# Patient Record
Sex: MALE | Race: WHITE | Employment: OTHER | URBAN - METROPOLITAN AREA
[De-identification: names, ages, dates, MRNs, and addresses within clinical notes are randomized per-mention and may not be internally consistent; named-entity substitution may affect disease eponyms.]

---

## 2017-12-21 ENCOUNTER — HOSPITAL ENCOUNTER (EMERGENCY)
Facility: HOSPITAL | Age: 62
Discharge: HOME | End: 2017-12-21
Payer: MEDICAID

## 2017-12-21 ENCOUNTER — APPOINTMENT (OUTPATIENT)
Dept: GENERAL RADIOLOGY | Facility: HOSPITAL | Age: 62
End: 2017-12-21
Payer: MEDICAID

## 2017-12-21 VITALS
DIASTOLIC BLOOD PRESSURE: 90 MMHG | WEIGHT: 215 LBS | HEIGHT: 72 IN | SYSTOLIC BLOOD PRESSURE: 153 MMHG | OXYGEN SATURATION: 98 % | BODY MASS INDEX: 29.12 KG/M2 | TEMPERATURE: 98 F | HEART RATE: 91 BPM | RESPIRATION RATE: 18 BRPM

## 2017-12-21 DIAGNOSIS — S90.32XA CONTUSION OF LEFT FOOT, INITIAL ENCOUNTER: Primary | ICD-10-CM

## 2017-12-21 DIAGNOSIS — L03.119 CELLULITIS OF FOOT: ICD-10-CM

## 2017-12-21 PROCEDURE — 73630 X-RAY EXAM OF FOOT: CPT

## 2017-12-21 PROCEDURE — 82962 GLUCOSE BLOOD TEST: CPT

## 2017-12-21 PROCEDURE — 99283 EMERGENCY DEPT VISIT LOW MDM: CPT

## 2017-12-21 RX ORDER — CEPHALEXIN 500 MG/1
500 CAPSULE ORAL 4 TIMES DAILY
Qty: 28 CAPSULE | Refills: 0 | Status: SHIPPED | OUTPATIENT
Start: 2017-12-21 | End: 2018-01-09

## 2017-12-21 RX ORDER — HYDROCODONE BITARTRATE AND ACETAMINOPHEN 5; 325 MG/1; MG/1
1-2 TABLET ORAL EVERY 4 HOURS PRN
Qty: 10 TABLET | Refills: 0 | Status: SHIPPED | OUTPATIENT
Start: 2017-12-21 | End: 2018-01-09

## 2017-12-22 NOTE — ED INITIAL ASSESSMENT (HPI)
Dropped garbage can on left foot last Thursday.  Redness, swelling and discoloration to top of foot and 5th digit

## 2017-12-22 NOTE — ED PROVIDER NOTES
Patient Seen in: [facility] AND [facility] Emergency Department    History   Patient presents with:  Musculoskeletal Problem    Stated Complaint: Lt foot pain    HPI    Patient is a 80-year-old diabetic male who presents with left foot injury that occurred 8 day speech, 5/5 motor strength in all extremities, no focal deficits  SKIN: warm, dry        ED Course   Labs Reviewed - No data to display    ED Course as of Dec 21 2236  ------------------------------------------------------------       MDM   Xr Foot, Comple

## 2017-12-24 ENCOUNTER — HOSPITAL ENCOUNTER (INPATIENT)
Facility: HOSPITAL | Age: 62
LOS: 16 days | Discharge: NURSING, REHAB OR LONG-TERM CARE | DRG: 616 | End: 2018-01-09
Attending: EMERGENCY MEDICINE | Admitting: HOSPITALIST
Payer: MEDICAID

## 2017-12-24 DIAGNOSIS — I96 GANGRENE OF TOE (HCC): ICD-10-CM

## 2017-12-24 DIAGNOSIS — E11.628 CELLULITIS IN DIABETIC FOOT (HCC): Primary | ICD-10-CM

## 2017-12-24 DIAGNOSIS — L03.119 CELLULITIS IN DIABETIC FOOT (HCC): Primary | ICD-10-CM

## 2017-12-24 LAB
ANION GAP SERPL CALC-SCNC: 9 MMOL/L (ref 0–18)
BASOPHILS # BLD: 0.1 K/UL (ref 0–0.2)
BASOPHILS NFR BLD: 1 %
BUN SERPL-MCNC: 21 MG/DL (ref 8–20)
BUN/CREAT SERPL: 13.5 (ref 10–20)
CALCIUM SERPL-MCNC: 8.5 MG/DL (ref 8.5–10.5)
CHLORIDE SERPL-SCNC: 101 MMOL/L (ref 95–110)
CO2 SERPL-SCNC: 24 MMOL/L (ref 22–32)
CREAT SERPL-MCNC: 1.56 MG/DL (ref 0.5–1.5)
EOSINOPHIL # BLD: 0.1 K/UL (ref 0–0.7)
EOSINOPHIL NFR BLD: 1 %
ERYTHROCYTE [DISTWIDTH] IN BLOOD BY AUTOMATED COUNT: 13.3 % (ref 11–15)
GLUCOSE BLDC GLUCOMTR-MCNC: 286 MG/DL (ref 70–99)
GLUCOSE SERPL-MCNC: 321 MG/DL (ref 70–99)
HCT VFR BLD AUTO: 36.6 % (ref 41–52)
HGB BLD-MCNC: 12.4 G/DL (ref 13.5–17.5)
LYMPHOCYTES # BLD: 0.9 K/UL (ref 1–4)
LYMPHOCYTES NFR BLD: 7 %
MCH RBC QN AUTO: 27.9 PG (ref 27–32)
MCHC RBC AUTO-ENTMCNC: 33.8 G/DL (ref 32–37)
MCV RBC AUTO: 82.4 FL (ref 80–100)
MONOCYTES # BLD: 1.1 K/UL (ref 0–1)
MONOCYTES NFR BLD: 8 %
NEUTROPHILS # BLD AUTO: 11.3 K/UL (ref 1.8–7.7)
NEUTROPHILS NFR BLD: 84 %
OSMOLALITY UR CALC.SUM OF ELEC: 293 MOSM/KG (ref 275–295)
PLATELET # BLD AUTO: 126 K/UL (ref 140–400)
PMV BLD AUTO: 11.3 FL (ref 7.4–10.3)
POTASSIUM SERPL-SCNC: 4.3 MMOL/L (ref 3.3–5.1)
RBC # BLD AUTO: 4.44 M/UL (ref 4.5–5.9)
SODIUM SERPL-SCNC: 134 MMOL/L (ref 136–144)
WBC # BLD AUTO: 13.5 K/UL (ref 4–11)

## 2017-12-24 PROCEDURE — 80048 BASIC METABOLIC PNL TOTAL CA: CPT | Performed by: EMERGENCY MEDICINE

## 2017-12-24 PROCEDURE — 87040 BLOOD CULTURE FOR BACTERIA: CPT | Performed by: EMERGENCY MEDICINE

## 2017-12-24 PROCEDURE — 85025 COMPLETE CBC W/AUTO DIFF WBC: CPT | Performed by: EMERGENCY MEDICINE

## 2017-12-24 PROCEDURE — 96372 THER/PROPH/DIAG INJ SC/IM: CPT

## 2017-12-24 PROCEDURE — 96365 THER/PROPH/DIAG IV INF INIT: CPT

## 2017-12-24 PROCEDURE — 82962 GLUCOSE BLOOD TEST: CPT

## 2017-12-24 PROCEDURE — 96375 TX/PRO/DX INJ NEW DRUG ADDON: CPT

## 2017-12-24 PROCEDURE — 99285 EMERGENCY DEPT VISIT HI MDM: CPT

## 2017-12-24 PROCEDURE — 96368 THER/DIAG CONCURRENT INF: CPT

## 2017-12-24 RX ORDER — ONDANSETRON 2 MG/ML
4 INJECTION INTRAMUSCULAR; INTRAVENOUS EVERY 4 HOURS PRN
Status: DISCONTINUED | OUTPATIENT
Start: 2017-12-24 | End: 2018-01-09

## 2017-12-24 RX ORDER — HYDROCODONE BITARTRATE AND ACETAMINOPHEN 5; 325 MG/1; MG/1
1-2 TABLET ORAL EVERY 4 HOURS PRN
Status: DISCONTINUED | OUTPATIENT
Start: 2017-12-24 | End: 2018-01-09

## 2017-12-24 RX ORDER — METRONIDAZOLE 500 MG/100ML
500 INJECTION, SOLUTION INTRAVENOUS ONCE
Status: COMPLETED | OUTPATIENT
Start: 2017-12-24 | End: 2017-12-24

## 2017-12-24 RX ORDER — ONDANSETRON 2 MG/ML
4 INJECTION INTRAMUSCULAR; INTRAVENOUS EVERY 6 HOURS PRN
Status: DISCONTINUED | OUTPATIENT
Start: 2017-12-24 | End: 2018-01-09

## 2017-12-24 RX ORDER — HEPARIN SODIUM 5000 [USP'U]/ML
5000 INJECTION, SOLUTION INTRAVENOUS; SUBCUTANEOUS EVERY 8 HOURS SCHEDULED
Status: DISCONTINUED | OUTPATIENT
Start: 2017-12-24 | End: 2018-01-02

## 2017-12-24 RX ORDER — INSULIN ASPART 100 [IU]/ML
0.15 INJECTION, SOLUTION INTRAVENOUS; SUBCUTANEOUS ONCE
Status: COMPLETED | OUTPATIENT
Start: 2017-12-24 | End: 2017-12-24

## 2017-12-24 RX ORDER — DEXTROSE MONOHYDRATE 25 G/50ML
50 INJECTION, SOLUTION INTRAVENOUS AS NEEDED
Status: DISCONTINUED | OUTPATIENT
Start: 2017-12-24 | End: 2018-01-02

## 2017-12-24 RX ORDER — SODIUM CHLORIDE 9 MG/ML
INJECTION, SOLUTION INTRAVENOUS CONTINUOUS
Status: DISCONTINUED | OUTPATIENT
Start: 2017-12-24 | End: 2017-12-27

## 2017-12-24 RX ORDER — METRONIDAZOLE 500 MG/100ML
500 INJECTION, SOLUTION INTRAVENOUS EVERY 8 HOURS
Status: DISCONTINUED | OUTPATIENT
Start: 2017-12-25 | End: 2017-12-27

## 2017-12-24 RX ORDER — SODIUM CHLORIDE 0.9 % (FLUSH) 0.9 %
3 SYRINGE (ML) INJECTION AS NEEDED
Status: DISCONTINUED | OUTPATIENT
Start: 2017-12-24 | End: 2018-01-09

## 2017-12-24 RX ORDER — ASCORBIC ACID 500 MG
500 TABLET ORAL DAILY
Status: ON HOLD | COMMUNITY
End: 2018-07-30

## 2017-12-25 ENCOUNTER — APPOINTMENT (OUTPATIENT)
Dept: GENERAL RADIOLOGY | Facility: HOSPITAL | Age: 62
DRG: 616 | End: 2017-12-25
Attending: PODIATRIST
Payer: MEDICAID

## 2017-12-25 ENCOUNTER — APPOINTMENT (OUTPATIENT)
Dept: MRI IMAGING | Facility: HOSPITAL | Age: 62
DRG: 616 | End: 2017-12-25
Attending: HOSPITALIST
Payer: MEDICAID

## 2017-12-25 ENCOUNTER — ANESTHESIA (OUTPATIENT)
Dept: SURGERY | Facility: HOSPITAL | Age: 62
DRG: 616 | End: 2017-12-25
Payer: MEDICAID

## 2017-12-25 ENCOUNTER — ANESTHESIA EVENT (OUTPATIENT)
Dept: SURGERY | Facility: HOSPITAL | Age: 62
DRG: 616 | End: 2017-12-25
Payer: MEDICAID

## 2017-12-25 ENCOUNTER — SURGERY (OUTPATIENT)
Age: 62
End: 2017-12-25

## 2017-12-25 LAB
ALBUMIN SERPL BCP-MCNC: 2.4 G/DL (ref 3.5–4.8)
ALBUMIN/GLOB SERPL: 0.6 {RATIO} (ref 1–2)
ALP SERPL-CCNC: 68 U/L (ref 32–100)
ALT SERPL-CCNC: 48 U/L (ref 17–63)
ANION GAP SERPL CALC-SCNC: 9 MMOL/L (ref 0–18)
AST SERPL-CCNC: 32 U/L (ref 15–41)
BASOPHILS # BLD: 0 K/UL (ref 0–0.2)
BASOPHILS NFR BLD: 0 %
BILIRUB SERPL-MCNC: 1.1 MG/DL (ref 0.3–1.2)
BUN SERPL-MCNC: 17 MG/DL (ref 8–20)
BUN/CREAT SERPL: 12.6 (ref 10–20)
CALCIUM SERPL-MCNC: 8.4 MG/DL (ref 8.5–10.5)
CHLORIDE SERPL-SCNC: 104 MMOL/L (ref 95–110)
CO2 SERPL-SCNC: 22 MMOL/L (ref 22–32)
CREAT SERPL-MCNC: 1.35 MG/DL (ref 0.5–1.5)
EOSINOPHIL # BLD: 0.1 K/UL (ref 0–0.7)
EOSINOPHIL NFR BLD: 1 %
ERYTHROCYTE [DISTWIDTH] IN BLOOD BY AUTOMATED COUNT: 13.2 % (ref 11–15)
ERYTHROCYTE [SEDIMENTATION RATE] IN BLOOD: 65 MM/HR (ref 0–20)
GLOBULIN PLAS-MCNC: 4.1 G/DL (ref 2.5–3.7)
GLUCOSE BLDC GLUCOMTR-MCNC: 221 MG/DL (ref 70–99)
GLUCOSE BLDC GLUCOMTR-MCNC: 225 MG/DL (ref 70–99)
GLUCOSE BLDC GLUCOMTR-MCNC: 255 MG/DL (ref 70–99)
GLUCOSE BLDC GLUCOMTR-MCNC: 266 MG/DL (ref 70–99)
GLUCOSE SERPL-MCNC: 204 MG/DL (ref 70–99)
HCT VFR BLD AUTO: 34.8 % (ref 41–52)
HGB BLD-MCNC: 11.6 G/DL (ref 13.5–17.5)
LYMPHOCYTES # BLD: 1.4 K/UL (ref 1–4)
LYMPHOCYTES NFR BLD: 9 %
MAGNESIUM SERPL-MCNC: 1.6 MG/DL (ref 1.8–2.5)
MCH RBC QN AUTO: 27.8 PG (ref 27–32)
MCHC RBC AUTO-ENTMCNC: 33.4 G/DL (ref 32–37)
MCV RBC AUTO: 83.1 FL (ref 80–100)
MONOCYTES # BLD: 1.3 K/UL (ref 0–1)
MONOCYTES NFR BLD: 9 %
NEUTROPHILS # BLD AUTO: 11.9 K/UL (ref 1.8–7.7)
NEUTROPHILS NFR BLD: 81 %
OSMOLALITY UR CALC.SUM OF ELEC: 287 MOSM/KG (ref 275–295)
PLATELET # BLD AUTO: 143 K/UL (ref 140–400)
PMV BLD AUTO: 12.2 FL (ref 7.4–10.3)
POTASSIUM SERPL-SCNC: 3.9 MMOL/L (ref 3.3–5.1)
PROT SERPL-MCNC: 6.5 G/DL (ref 5.9–8.4)
RBC # BLD AUTO: 4.18 M/UL (ref 4.5–5.9)
SODIUM SERPL-SCNC: 135 MMOL/L (ref 136–144)
WBC # BLD AUTO: 14.8 K/UL (ref 4–11)

## 2017-12-25 PROCEDURE — 82962 GLUCOSE BLOOD TEST: CPT

## 2017-12-25 PROCEDURE — 80053 COMPREHEN METABOLIC PANEL: CPT | Performed by: HOSPITALIST

## 2017-12-25 PROCEDURE — 88311 DECALCIFY TISSUE: CPT | Performed by: PODIATRIST

## 2017-12-25 PROCEDURE — 83735 ASSAY OF MAGNESIUM: CPT | Performed by: HOSPITALIST

## 2017-12-25 PROCEDURE — 87147 CULTURE TYPE IMMUNOLOGIC: CPT | Performed by: PODIATRIST

## 2017-12-25 PROCEDURE — 87070 CULTURE OTHR SPECIMN AEROBIC: CPT | Performed by: PODIATRIST

## 2017-12-25 PROCEDURE — 83036 HEMOGLOBIN GLYCOSYLATED A1C: CPT | Performed by: HOSPITALIST

## 2017-12-25 PROCEDURE — 85025 COMPLETE CBC W/AUTO DIFF WBC: CPT | Performed by: HOSPITALIST

## 2017-12-25 PROCEDURE — 87075 CULTR BACTERIA EXCEPT BLOOD: CPT | Performed by: PODIATRIST

## 2017-12-25 PROCEDURE — 85652 RBC SED RATE AUTOMATED: CPT | Performed by: HOSPITALIST

## 2017-12-25 PROCEDURE — 73718 MRI LOWER EXTREMITY W/O DYE: CPT | Performed by: HOSPITALIST

## 2017-12-25 PROCEDURE — 87205 SMEAR GRAM STAIN: CPT | Performed by: PODIATRIST

## 2017-12-25 PROCEDURE — 87186 SC STD MICRODIL/AGAR DIL: CPT | Performed by: PODIATRIST

## 2017-12-25 PROCEDURE — 0SBN0ZZ EXCISION OF LEFT METATARSAL-PHALANGEAL JOINT, OPEN APPROACH: ICD-10-PCS | Performed by: PODIATRIST

## 2017-12-25 PROCEDURE — 88305 TISSUE EXAM BY PATHOLOGIST: CPT | Performed by: PODIATRIST

## 2017-12-25 PROCEDURE — 0Y6N0ZF DETACHMENT AT LEFT FOOT, PARTIAL 5TH RAY, OPEN APPROACH: ICD-10-PCS | Performed by: PODIATRIST

## 2017-12-25 PROCEDURE — 87076 CULTURE ANAEROBE IDENT EACH: CPT | Performed by: PODIATRIST

## 2017-12-25 PROCEDURE — 87176 TISSUE HOMOGENIZATION CULTR: CPT | Performed by: PODIATRIST

## 2017-12-25 PROCEDURE — 76001 XR C-ARM FLUORO >1 HOUR  (CPT=76001): CPT | Performed by: PODIATRIST

## 2017-12-25 RX ORDER — ONDANSETRON 2 MG/ML
INJECTION INTRAMUSCULAR; INTRAVENOUS AS NEEDED
Status: DISCONTINUED | OUTPATIENT
Start: 2017-12-25 | End: 2017-12-25 | Stop reason: SURG

## 2017-12-25 RX ORDER — SODIUM CHLORIDE, SODIUM LACTATE, POTASSIUM CHLORIDE, CALCIUM CHLORIDE 600; 310; 30; 20 MG/100ML; MG/100ML; MG/100ML; MG/100ML
INJECTION, SOLUTION INTRAVENOUS CONTINUOUS
Status: DISCONTINUED | OUTPATIENT
Start: 2017-12-25 | End: 2017-12-25 | Stop reason: HOSPADM

## 2017-12-25 RX ORDER — LIDOCAINE HYDROCHLORIDE 10 MG/ML
INJECTION, SOLUTION EPIDURAL; INFILTRATION; INTRACAUDAL; PERINEURAL AS NEEDED
Status: DISCONTINUED | OUTPATIENT
Start: 2017-12-25 | End: 2017-12-25 | Stop reason: SURG

## 2017-12-25 RX ORDER — HYDROCODONE BITARTRATE AND ACETAMINOPHEN 5; 325 MG/1; MG/1
1 TABLET ORAL AS NEEDED
Status: DISCONTINUED | OUTPATIENT
Start: 2017-12-25 | End: 2017-12-25 | Stop reason: HOSPADM

## 2017-12-25 RX ORDER — HYDROCODONE BITARTRATE AND ACETAMINOPHEN 5; 325 MG/1; MG/1
2 TABLET ORAL AS NEEDED
Status: DISCONTINUED | OUTPATIENT
Start: 2017-12-25 | End: 2017-12-25 | Stop reason: HOSPADM

## 2017-12-25 RX ORDER — DEXAMETHASONE SODIUM PHOSPHATE 4 MG/ML
VIAL (ML) INJECTION AS NEEDED
Status: DISCONTINUED | OUTPATIENT
Start: 2017-12-25 | End: 2017-12-25 | Stop reason: SURG

## 2017-12-25 RX ORDER — HYDROMORPHONE HYDROCHLORIDE 1 MG/ML
0.2 INJECTION, SOLUTION INTRAMUSCULAR; INTRAVENOUS; SUBCUTANEOUS EVERY 5 MIN PRN
Status: DISCONTINUED | OUTPATIENT
Start: 2017-12-25 | End: 2017-12-25 | Stop reason: HOSPADM

## 2017-12-25 RX ORDER — ONDANSETRON 2 MG/ML
4 INJECTION INTRAMUSCULAR; INTRAVENOUS ONCE AS NEEDED
Status: COMPLETED | OUTPATIENT
Start: 2017-12-25 | End: 2017-12-25

## 2017-12-25 RX ORDER — MEPERIDINE HYDROCHLORIDE 50 MG/ML
25 INJECTION INTRAMUSCULAR; INTRAVENOUS; SUBCUTANEOUS ONCE
Status: COMPLETED | OUTPATIENT
Start: 2017-12-25 | End: 2017-12-25

## 2017-12-25 RX ORDER — HYDROMORPHONE HYDROCHLORIDE 1 MG/ML
0.5 INJECTION, SOLUTION INTRAMUSCULAR; INTRAVENOUS; SUBCUTANEOUS EVERY 2 HOUR PRN
Status: CANCELLED | OUTPATIENT
Start: 2017-12-25

## 2017-12-25 RX ORDER — HYDROMORPHONE HYDROCHLORIDE 1 MG/ML
0.4 INJECTION, SOLUTION INTRAMUSCULAR; INTRAVENOUS; SUBCUTANEOUS EVERY 5 MIN PRN
Status: DISCONTINUED | OUTPATIENT
Start: 2017-12-25 | End: 2017-12-25 | Stop reason: HOSPADM

## 2017-12-25 RX ORDER — BUPIVACAINE HYDROCHLORIDE 5 MG/ML
INJECTION, SOLUTION EPIDURAL; INTRACAUDAL AS NEEDED
Status: DISCONTINUED | OUTPATIENT
Start: 2017-12-25 | End: 2017-12-25 | Stop reason: HOSPADM

## 2017-12-25 RX ORDER — NALOXONE HYDROCHLORIDE 0.4 MG/ML
80 INJECTION, SOLUTION INTRAMUSCULAR; INTRAVENOUS; SUBCUTANEOUS AS NEEDED
Status: DISCONTINUED | OUTPATIENT
Start: 2017-12-25 | End: 2017-12-25 | Stop reason: HOSPADM

## 2017-12-25 RX ORDER — SODIUM CHLORIDE, SODIUM LACTATE, POTASSIUM CHLORIDE, CALCIUM CHLORIDE 600; 310; 30; 20 MG/100ML; MG/100ML; MG/100ML; MG/100ML
INJECTION, SOLUTION INTRAVENOUS CONTINUOUS PRN
Status: DISCONTINUED | OUTPATIENT
Start: 2017-12-25 | End: 2017-12-25 | Stop reason: SURG

## 2017-12-25 RX ORDER — MAGNESIUM OXIDE 400 MG (241.3 MG MAGNESIUM) TABLET
400 TABLET ONCE
Status: COMPLETED | OUTPATIENT
Start: 2017-12-25 | End: 2017-12-25

## 2017-12-25 RX ORDER — SODIUM CHLORIDE 0.9 % (FLUSH) 0.9 %
10 SYRINGE (ML) INJECTION AS NEEDED
Status: DISCONTINUED | OUTPATIENT
Start: 2017-12-25 | End: 2018-01-02 | Stop reason: HOSPADM

## 2017-12-25 RX ADMIN — SODIUM CHLORIDE, SODIUM LACTATE, POTASSIUM CHLORIDE, CALCIUM CHLORIDE: 600; 310; 30; 20 INJECTION, SOLUTION INTRAVENOUS at 15:50:00

## 2017-12-25 RX ADMIN — ONDANSETRON 4 MG: 2 INJECTION INTRAMUSCULAR; INTRAVENOUS at 16:05:00

## 2017-12-25 RX ADMIN — DEXAMETHASONE SODIUM PHOSPHATE 4 MG: 4 MG/ML VIAL (ML) INJECTION at 16:05:00

## 2017-12-25 RX ADMIN — LIDOCAINE HYDROCHLORIDE 25 MG: 10 INJECTION, SOLUTION EPIDURAL; INFILTRATION; INTRACAUDAL; PERINEURAL at 15:51:00

## 2017-12-25 NOTE — ANESTHESIA POSTPROCEDURE EVALUATION
Patient: Denise Stager    Procedure Summary     Date:  12/25/17 Room / Location:  [address] [place] [address] MAIN OR 05 / [address] [place] [address] MAIN OR    Anesthesia Start:  1072 Anesthesia Stop:  5605    Procedure:  TOE AMPUTATION (Left ) Diagnosis:       Gangrene of toe (HCC)      (Gangrene of

## 2017-12-25 NOTE — BRIEF OP NOTE
Pre-Operative Diagnosis: Gangrene of the fifth toe with cellulitis undermining necrosis of the dorsal forefoot left lower extremity     Post-Operative Diagnosis: Same     Procedure Performed:   Procedure(s):  PARTIAL FIFTH RAY AMPUTATION, LEFT FOOT, inc

## 2017-12-25 NOTE — ANESTHESIA PREPROCEDURE EVALUATION
Anesthesia PreOp Note    HPI:     Marcell Leal is a 58year old male who presents for preoperative consultation requested by: Obie Bhakta DPM    Date of Surgery: 12/24/2017 - 12/25/2017    Procedure(s):  TOE AMPUTATION  Indication: Gangrene of to Intravenous Q4H PRN Gloria Bauer MD     HYDROcodone-acetaminophen [facility] AND [facility]) 5-325 MG per tab 1-2 tablet 1-2 tablet Oral Q4H PRN Susana Briceño MD 2 tablet at 12/25/17 1345    insulin detemir (LEVEMIR) 100 UNIT/ML flextouch 70 Units 70 Units Subcut status: Never Smoker    Smokeless tobacco: Never Used    Alcohol use No    Drug use: No    Sexual activity: Not on file     Other Topics Concern   None on file     Social History Narrative   None on file       Available pre-op labs reviewed.     Lab Results Deanna Abdalla  of the nature of the anesthetic plan, benefits, risks, major complications, and any alternative forms of anesthetic management. All of the patient's questions were answered to the best of my ability.  The patient desires the anesthetic man

## 2017-12-25 NOTE — ED NOTES
Pt c/o increased pain and swelling to left foot. Pt recently diagnosed with cellulitis and started on keflex without improvement. Pt has intact blister to top of foot. With wound and bluish discoloration to fifth digit.  Pt has full ROM, difficulty ambulati

## 2017-12-25 NOTE — ED NOTES
Report given to Myron Lancaster. Pt will be trasported to room with all belongings once the room is clean.

## 2017-12-25 NOTE — H&P
[facility] Hospitalist H&P       CC: Patient presents with:  Cellulitis (integumentary, infectious)       PCP: None Pcp      ASSESSMENT / PLAN:       Mr. Christopher Patrick is a 57 yo M with PMH of DM2- insulin dependent, HTN, stroke who presented with worsening L foot pain wounds had been healing until injury a few days ago when trash can ran over L foot. PMH  Past Medical History:   Diagnosis Date   • Diabetes [facility])    • Essential hypertension    • Stroke [facility])         [address]  History reviewed.  No pertinent surgical histor wheezes  CV: RRR, no murmurs   ABD: Soft, non-tender, non-distended, +BS  MSK: strength 5/5 in all extremities  Neuro: Grossly normal, CN intact, decreased sensation of L 5th toe  Psych: Affect- normal  SKIN: erythema of dorsum of L foot with large dark bl

## 2017-12-25 NOTE — PROGRESS NOTES
[facility] dosing service    Initial Pharmacokinetic Consult for Vancomycin Dosing     Keely Meraz is a 58year old male who is being treated for cellulitis. Pharmacy has been asked to dose Vancomycin by Dr. Marcus Armenta    He has No Known Allergies. Vancomycin to assess renal function. 4.  Pharmacy will follow and monitor renal function changes, toxicity and efficacy. Pharmacy will continue to follow him. We appreciate the opportunity to assist in his care.     SANTANA Wilson [facility]  12/24/201

## 2017-12-25 NOTE — ED INITIAL ASSESSMENT (HPI)
Pt c/o increase swelling to left foot + increase bluish discoloration to 5th toe + blister to top of left foot is increasing, was seen in this ER on 12/21 with the same complaints, pt sts that abx is not working good for him, denies fever/chills.

## 2017-12-25 NOTE — CONSULTS
[address] Patient Status:  Inpatient    1955 MRN [id number]   Location [facility] Attending Mayco Travis MD   Hosp Day # 1 PCP None Pcp     Reason for Consultatio 5-325 MG per tab 2 tablet, 2 tablet, Oral, PRN  •  fentaNYL citrate (SUBLIMAZE) 0.05 MG/ML injection 25 mcg, 25 mcg, Intravenous, Q5 Min PRN  •  fentaNYL citrate (SUBLIMAZE) 0.05 MG/ML injection 50 mcg, 50 mcg, Intravenous, Q5 Min PRN  •  HYDROmorphone HCl weight loss. Eyes: Negative for visual disturbance, irritation and redness. Ears, nose, mouth, throat, and face: Negative for hearing loss, tinnitus, nasal congestion, snoring, sore throat, hoarseness and voice change.   Respiratory: Negative for cough, s No thrush noted. Neck: Soft, supple neck; trachea midline, no adenopathy, no thyromegaly. Lungs: CTAB, normal and equal bilateral chest rise   Chest wall: No tenderness or deformity. Heart: Regular rate and rhythm, normal S1S2, no murmur.    Abdomen: today,   - On IV Vancomycin, Cefepime and Flagyl, Pharm to dose,   - Will follow Arterial dopplers too,     2. DM : Needs tighter glycemic control, Glycemic Hgb 11,  - per PCP.     3.  Leukocytosis: sec to above, will trend it,     Discussed with patient,

## 2017-12-25 NOTE — PROGRESS NOTES
[facility] AND [facility]  Report of Consultation    Libby Glasgow Patient Status:  Inpatient    1955 MRN [id number]   Location [facility] 5SW/SE Attending Deette Sacks, MD   Hosp Day # 1 PCP None Pcp     Date of Admission:  2017  Date of injection 4 mg, 4 mg, Intravenous, Q6H PRN  •  Insulin Aspart Pen (NOVOLOG) 100 UNIT/ML flexpen 1-7 Units, 1-7 Units, Subcutaneous, TID CC  •  Cefepime HCl (MAXIPIME) 1 g in sodium chloride 0.9 % 100 mL IVPB, 1 g, Intravenous, Q8H  •  metRONIDAZOLE in NaCl have incision and drainage of infection and amputation of the fifth ray at least partially.   The nature and extent of the surgery the necessity for the possible complications and risks including loss of limb and eventually further debridements and surgerie

## 2017-12-25 NOTE — ED PROVIDER NOTES
Patient Seen in: [facility] AND [facility] Emergency Department    History   Patient presents with:  Cellulitis (integumentary, infectious)      HPI    Patient with a history of diabetes, hypertension and past stroke presents complaining of increasing swelling, Family History   Problem Relation Age of Onset   • Cancer Father    • Dementia Mother    • Other Valerie [place] Mother    • Diabetes Paternal Grandfather    • Cancer Brother        Smoking Status: Social History    Marital status:              Spous measuring 3×3 cm in size present over the distal dorsal foot. Fifth toe changes as noted above. Nursing note and vitals reviewed.       ED Course        Labs Reviewed   BASIC METABOLIC PANEL (8) - Abnormal; Notable for the following:        Result Value detemir (LEVEMIR) 100 UNIT/ML flextouch 70 Units (70 Units Subcutaneous Given 12/25/17 0029)   Normal Saline Flush 0.9 % injection 3 mL (3 mL Intravenous Given 12/24/17 6421)   dextrose 50% injection 50 mL (not administered)   Glucose-Vitamin C (DEX-4) 4-0 recent pertinent discharge summaries, testing, and procedures and reviewed those reports. Complicating Factors:  The patient already has has Cellulitis in diabetic foot ([address] and Gangrene of toe ([address] on his problem list. to contribute to the complexity

## 2017-12-26 ENCOUNTER — APPOINTMENT (OUTPATIENT)
Dept: GENERAL RADIOLOGY | Facility: HOSPITAL | Age: 62
DRG: 616 | End: 2017-12-26
Attending: HOSPITALIST
Payer: MEDICAID

## 2017-12-26 ENCOUNTER — APPOINTMENT (OUTPATIENT)
Dept: INTERVENTIONAL RADIOLOGY/VASCULAR | Facility: HOSPITAL | Age: 62
DRG: 616 | End: 2017-12-26
Attending: SURGERY
Payer: MEDICAID

## 2017-12-26 LAB
ANION GAP SERPL CALC-SCNC: 8 MMOL/L (ref 0–18)
BUN SERPL-MCNC: 20 MG/DL (ref 8–20)
BUN/CREAT SERPL: 13.2 (ref 10–20)
CALCIUM SERPL-MCNC: 8.2 MG/DL (ref 8.5–10.5)
CHLORIDE SERPL-SCNC: 104 MMOL/L (ref 95–110)
CO2 SERPL-SCNC: 22 MMOL/L (ref 22–32)
CREAT SERPL-MCNC: 1.52 MG/DL (ref 0.5–1.5)
ERYTHROCYTE [DISTWIDTH] IN BLOOD BY AUTOMATED COUNT: 13.4 % (ref 11–15)
GLUCOSE BLDC GLUCOMTR-MCNC: 209 MG/DL (ref 70–99)
GLUCOSE BLDC GLUCOMTR-MCNC: 239 MG/DL (ref 70–99)
GLUCOSE BLDC GLUCOMTR-MCNC: 254 MG/DL (ref 70–99)
GLUCOSE BLDC GLUCOMTR-MCNC: 254 MG/DL (ref 70–99)
GLUCOSE BLDC GLUCOMTR-MCNC: 297 MG/DL (ref 70–99)
GLUCOSE SERPL-MCNC: 292 MG/DL (ref 70–99)
HBA1C MFR BLD: 10 % (ref 4–6)
HCT VFR BLD AUTO: 38.5 % (ref 41–52)
HGB BLD-MCNC: 12.6 G/DL (ref 13.5–17.5)
MAGNESIUM SERPL-MCNC: 1.8 MG/DL (ref 1.8–2.5)
MCH RBC QN AUTO: 27.3 PG (ref 27–32)
MCHC RBC AUTO-ENTMCNC: 32.7 G/DL (ref 32–37)
MCV RBC AUTO: 83.4 FL (ref 80–100)
OSMOLALITY UR CALC.SUM OF ELEC: 291 MOSM/KG (ref 275–295)
PLATELET # BLD AUTO: 200 K/UL (ref 140–400)
PMV BLD AUTO: 12 FL (ref 7.4–10.3)
POTASSIUM SERPL-SCNC: 4.5 MMOL/L (ref 3.3–5.1)
RBC # BLD AUTO: 4.61 M/UL (ref 4.5–5.9)
SODIUM SERPL-SCNC: 134 MMOL/L (ref 136–144)
VANCOMYCIN TROUGH SERPL-MCNC: 10.2 MCG/ML (ref 10–20)
WBC # BLD AUTO: 29.4 K/UL (ref 4–11)

## 2017-12-26 PROCEDURE — 99213 OFFICE O/P EST LOW 20 MIN: CPT

## 2017-12-26 PROCEDURE — 85027 COMPLETE CBC AUTOMATED: CPT | Performed by: HOSPITALIST

## 2017-12-26 PROCEDURE — 83735 ASSAY OF MAGNESIUM: CPT | Performed by: HOSPITALIST

## 2017-12-26 PROCEDURE — 75710 ARTERY X-RAYS ARM/LEG: CPT

## 2017-12-26 PROCEDURE — 99153 MOD SED SAME PHYS/QHP EA: CPT

## 2017-12-26 PROCEDURE — 75774 ARTERY X-RAY EACH VESSEL: CPT

## 2017-12-26 PROCEDURE — 71010 XR CHEST AP PORTABLE  (CPT=71010): CPT | Performed by: HOSPITALIST

## 2017-12-26 PROCEDURE — 82962 GLUCOSE BLOOD TEST: CPT

## 2017-12-26 PROCEDURE — 37228 HC TRANSLUMINAL ANGIO TIBIAL PERONEAL UNILAT INIT: CPT

## 2017-12-26 PROCEDURE — 047S3ZZ DILATION OF LEFT POSTERIOR TIBIAL ARTERY, PERCUTANEOUS APPROACH: ICD-10-PCS | Performed by: SURGERY

## 2017-12-26 PROCEDURE — 80048 BASIC METABOLIC PNL TOTAL CA: CPT | Performed by: HOSPITALIST

## 2017-12-26 PROCEDURE — 99152 MOD SED SAME PHYS/QHP 5/>YRS: CPT

## 2017-12-26 PROCEDURE — 80202 ASSAY OF VANCOMYCIN: CPT | Performed by: HOSPITALIST

## 2017-12-26 PROCEDURE — 87040 BLOOD CULTURE FOR BACTERIA: CPT | Performed by: NURSE PRACTITIONER

## 2017-12-26 PROCEDURE — 36247 INS CATH ABD/L-EXT ART 3RD: CPT

## 2017-12-26 RX ORDER — SODIUM CHLORIDE 0.9 % (FLUSH) 0.9 %
10 SYRINGE (ML) INJECTION AS NEEDED
Status: DISCONTINUED | OUTPATIENT
Start: 2017-12-26 | End: 2018-01-02 | Stop reason: HOSPADM

## 2017-12-26 RX ORDER — LIDOCAINE HYDROCHLORIDE 20 MG/ML
INJECTION, SOLUTION EPIDURAL; INFILTRATION; INTRACAUDAL; PERINEURAL
Status: COMPLETED
Start: 2017-12-26 | End: 2017-12-26

## 2017-12-26 RX ORDER — HEPARIN SODIUM 1000 [USP'U]/ML
INJECTION, SOLUTION INTRAVENOUS; SUBCUTANEOUS
Status: COMPLETED
Start: 2017-12-26 | End: 2017-12-26

## 2017-12-26 RX ORDER — ASPIRIN 81 MG/1
81 TABLET, CHEWABLE ORAL DAILY
Status: DISCONTINUED | OUTPATIENT
Start: 2017-12-27 | End: 2018-01-09

## 2017-12-26 RX ORDER — SODIUM CHLORIDE 9 MG/ML
INJECTION, SOLUTION INTRAVENOUS CONTINUOUS
Status: DISCONTINUED | OUTPATIENT
Start: 2017-12-26 | End: 2017-12-26

## 2017-12-26 RX ORDER — MAGNESIUM OXIDE 400 MG (241.3 MG MAGNESIUM) TABLET
400 TABLET ONCE
Status: COMPLETED | OUTPATIENT
Start: 2017-12-26 | End: 2017-12-26

## 2017-12-26 RX ORDER — DEXTROSE MONOHYDRATE 25 G/50ML
50 INJECTION, SOLUTION INTRAVENOUS AS NEEDED
Status: DISCONTINUED | OUTPATIENT
Start: 2017-12-26 | End: 2018-01-02

## 2017-12-26 RX ORDER — INSULIN ASPART 100 [IU]/ML
0.15 INJECTION, SOLUTION INTRAVENOUS; SUBCUTANEOUS ONCE
Status: DISCONTINUED | OUTPATIENT
Start: 2017-12-26 | End: 2017-12-26

## 2017-12-26 RX ORDER — NITROGLYCERIN 20 MG/100ML
INJECTION INTRAVENOUS
Status: COMPLETED
Start: 2017-12-26 | End: 2017-12-26

## 2017-12-26 RX ORDER — CLOPIDOGREL BISULFATE 75 MG/1
300 TABLET ORAL ONCE
Status: COMPLETED | OUTPATIENT
Start: 2017-12-26 | End: 2017-12-26

## 2017-12-26 RX ORDER — MIDAZOLAM HYDROCHLORIDE 1 MG/ML
INJECTION INTRAMUSCULAR; INTRAVENOUS
Status: COMPLETED
Start: 2017-12-26 | End: 2017-12-26

## 2017-12-26 RX ORDER — CLOPIDOGREL BISULFATE 75 MG/1
75 TABLET ORAL DAILY
Status: DISCONTINUED | OUTPATIENT
Start: 2017-12-27 | End: 2018-01-02

## 2017-12-26 NOTE — WOUND PROGRESS NOTE
WOUND CARE NOTE      PLAN   Recommendations:  Dr. Opal Hong and Dr Shreya Lassiter are following the pt.    Dietary consult for recommendations for nutrition to optimize wound healing  Turn schedules  Heels elevated using pillows, heel wedge or heel boots to offload 12/26/2017   K 4.5 12/26/2017    12/26/2017   CO2 22 12/26/2017    (H) 12/26/2017   CA 8.2 (L) 12/26/2017   ALB 2.4 (L) 12/25/2017   ALKPHO 68 12/25/2017   BILT 1.1 12/25/2017   TP 6.5 12/25/2017   AST 32 12/25/2017   ALT 48 12/25/2017   MG 1.

## 2017-12-26 NOTE — BRIEF OP NOTE
[facility] AND [facility]  Brief Endovascular Procedure Note     Milan Earthly Location: CATH LAB   CSN [id number] MRN [id number]   Admission Date 12/24/2017 Operation Date 12/26/2017   Attending Physician Steward Kussmaul, MD Operating Physician Loyd Askew

## 2017-12-26 NOTE — CONSULTS
Cari Segura, [facility]  Division of Vascular and Endovascular Surgery  [address] . [facility]     VASCULAR SURGERY CONSULT NOTE      Name: Keely Meraz   :   9/4/1955  [id number]     REFERRING PHYSICIAN: Josr Heath MD  PRIMARY CA Glucose-Vitamin C (DEX-4) 4-0.006 g chewable tab 4 tablet, 4 tablet, Oral, Q15 Min PRN  •  0.9%  NaCl infusion, , Intravenous, Continuous  •  Heparin Sodium (Porcine) 5000 UNIT/ML injection 5,000 Units, 5,000 Units, Subcutaneous, Q8H [facility] & half-way  •  ondansetron HC monophasic signal monophasic signal none      The entire dorsum of the left foot has erythema with the skin overlying the third fourth and fifth metatarsal areas has been debrided with the remaining edges of the skin appearing ischemic.     no sensory or m VIEWS), LEFT (CPT=73630), 12/21/2017, 21:01.   INDICATIONS: Left foot ulcer, dorsal aspect over head of 4th and 5th metatarsal.  TECHNIQUE: A complete multi-planar examination was performed without contrast.  FINDINGS:  BONES: Gross anatomic alignment of th fluoroscopic imaging technique is used for this exam with images centered over the left mid foot. Imaging was acquired in the frontal projection.    Imaging shows dilatation of the 5th toes beginning at the level of the mid shaft of the 5th metatarsal.   5- worsening and need for open surgery among other complications.  Hemorrhage and hematoma are usually evident within 12 hours after the procedure while other complications, such as pseudoaneurysm and arteriovenous fistula, may not become apparent for days to

## 2017-12-26 NOTE — PROGRESS NOTES
[facility] AND [facility] Infectious Disease Progress Note    Marcell eLal Patient Status:  Inpatient    1955 MRN [id number]   Location [facility] 5SW/SE Attending Nadege Bazzi MD   Hosp Day # 2 PCP None Pcp     Subjective:  Pt.  Resting i Cooperative. No apparent distress. Vital Signs:  Blood pressure 133/56, pulse 95, temperature 98.1 °F (36.7 °C), temperature source Oral, resp. rate 20, height 6' 0.01\" (1.829 m), weight 221 lb 11.2 oz (100.6 kg), SpO2 91 %.    Temp (24hrs), Av.3 °F to above  -increased from 14.8K to 29.4K today  -Afebrile  -Repeat blood cultures x 2 sets today  -will continue to trend      If you have any questions or concerns please call [facility] at [phone].      PILAR Mack  12/26/2017  1:54 PM

## 2017-12-26 NOTE — PROGRESS NOTES
[facility] Hospitalist Progress Note     PCP: None Pcp    CC: Follow up       Assessment/Plan:     Mr. Autumn Ramsey is a 57 yo M with PMH of DM2- insulin dependent, HTN, stroke who presented with worsening L foot pain and swelling after injury to his foot a few days a and/or patient's family given opportunity to ask questions and note understanding and agreeing with therapeutic plan as outlined    Thank Jihan Gallegos M.D.  [facility] Hospitalist  Answering Service: [phone]        Subjective     States he f 0528   WBC  13.5*  14.8*  29.4*   HGB  12.4*  11.6*  12.6*   MCV  82.4  83.1  83.4   PLT  126*  143  200       Recent Labs   Lab  12/24/17   2030  12/25/17   0603  12/26/17   0528   NA  134*  135*  134*   K  4.3  3.9  4.5   CL  101  104  104   CO2  24  22

## 2017-12-26 NOTE — OPERATIVE REPORT
[facility]    PATIENT'S NAME: Edouard López   ATTENDING PHYSICIAN: Ginny Landis. Danielle Clarke MD   OPERATING PHYSICIAN: Siri Mckeon DPM   PATIENT ACCOUNT#:   [de-identified]    LOCATION:  [facility] [address] [facility] #:   [id number]       DATE OF placed in a supine position on the operating table. Time-out was taken. The general anesthetic was administered, the left foot was prepped and draped using the usual aseptic technique, and hemostasis was achieved in the above-mentioned fashion.   A tennis containing bacitracin, 50,000 units/L, and the area was packed with Gelfoam and a sterile postoperative dressing consisting of Xeroform, sterile gauze, 4-inch Krystin, ABD pads, and an Ace wrap was applied for compression.   One suture of Prolene had been olivia

## 2017-12-26 NOTE — PROGRESS NOTES
Report called to [facility]. Right groin clean dry and intact. No bleeding or hematoma noted.

## 2017-12-27 LAB
ANION GAP SERPL CALC-SCNC: 6 MMOL/L (ref 0–18)
BNP SERPL-MCNC: 446 PG/ML (ref 0–100)
BUN SERPL-MCNC: 16 MG/DL (ref 8–20)
BUN/CREAT SERPL: 11.5 (ref 10–20)
CALCIUM SERPL-MCNC: 7.9 MG/DL (ref 8.5–10.5)
CHLORIDE SERPL-SCNC: 105 MMOL/L (ref 95–110)
CO2 SERPL-SCNC: 25 MMOL/L (ref 22–32)
CREAT SERPL-MCNC: 1.39 MG/DL (ref 0.5–1.5)
ERYTHROCYTE [DISTWIDTH] IN BLOOD BY AUTOMATED COUNT: 13.5 % (ref 11–15)
GLUCOSE BLDC GLUCOMTR-MCNC: 173 MG/DL (ref 70–99)
GLUCOSE BLDC GLUCOMTR-MCNC: 252 MG/DL (ref 70–99)
GLUCOSE BLDC GLUCOMTR-MCNC: 328 MG/DL (ref 70–99)
GLUCOSE BLDC GLUCOMTR-MCNC: 339 MG/DL (ref 70–99)
GLUCOSE SERPL-MCNC: 221 MG/DL (ref 70–99)
HCT VFR BLD AUTO: 35.6 % (ref 41–52)
HGB BLD-MCNC: 11.8 G/DL (ref 13.5–17.5)
MAGNESIUM SERPL-MCNC: 1.8 MG/DL (ref 1.8–2.5)
MCH RBC QN AUTO: 27.7 PG (ref 27–32)
MCHC RBC AUTO-ENTMCNC: 33.3 G/DL (ref 32–37)
MCV RBC AUTO: 83.2 FL (ref 80–100)
OSMOLALITY UR CALC.SUM OF ELEC: 290 MOSM/KG (ref 275–295)
PLATELET # BLD AUTO: 183 K/UL (ref 140–400)
PMV BLD AUTO: 11.6 FL (ref 7.4–10.3)
POTASSIUM SERPL-SCNC: 4 MMOL/L (ref 3.3–5.1)
RBC # BLD AUTO: 4.27 M/UL (ref 4.5–5.9)
SODIUM SERPL-SCNC: 136 MMOL/L (ref 136–144)
WBC # BLD AUTO: 20.2 K/UL (ref 4–11)

## 2017-12-27 PROCEDURE — 83880 ASSAY OF NATRIURETIC PEPTIDE: CPT | Performed by: HOSPITALIST

## 2017-12-27 PROCEDURE — 94640 AIRWAY INHALATION TREATMENT: CPT

## 2017-12-27 PROCEDURE — 80048 BASIC METABOLIC PNL TOTAL CA: CPT | Performed by: HOSPITALIST

## 2017-12-27 PROCEDURE — 92610 EVALUATE SWALLOWING FUNCTION: CPT

## 2017-12-27 PROCEDURE — 82962 GLUCOSE BLOOD TEST: CPT

## 2017-12-27 PROCEDURE — 99212 OFFICE O/P EST SF 10 MIN: CPT

## 2017-12-27 PROCEDURE — 83735 ASSAY OF MAGNESIUM: CPT | Performed by: HOSPITALIST

## 2017-12-27 PROCEDURE — 85027 COMPLETE CBC AUTOMATED: CPT | Performed by: HOSPITALIST

## 2017-12-27 PROCEDURE — 3E0F73Z INTRODUCTION OF ANTI-INFLAMMATORY INTO RESPIRATORY TRACT, VIA NATURAL OR ARTIFICIAL OPENING: ICD-10-PCS | Performed by: HOSPITALIST

## 2017-12-27 RX ORDER — IPRATROPIUM BROMIDE AND ALBUTEROL SULFATE 2.5; .5 MG/3ML; MG/3ML
3 SOLUTION RESPIRATORY (INHALATION)
Status: DISCONTINUED | OUTPATIENT
Start: 2017-12-27 | End: 2017-12-31

## 2017-12-27 RX ORDER — IPRATROPIUM BROMIDE AND ALBUTEROL SULFATE 2.5; .5 MG/3ML; MG/3ML
3 SOLUTION RESPIRATORY (INHALATION) EVERY 4 HOURS PRN
Status: DISCONTINUED | OUTPATIENT
Start: 2017-12-27 | End: 2017-12-27

## 2017-12-27 RX ORDER — MAGNESIUM OXIDE 400 MG (241.3 MG MAGNESIUM) TABLET
400 TABLET ONCE
Status: COMPLETED | OUTPATIENT
Start: 2017-12-27 | End: 2017-12-27

## 2017-12-27 NOTE — PLAN OF CARE
Problem: Diabetes/Glucose Control  Goal: Glucose maintained within prescribed range  INTERVENTIONS:  - Monitor Blood Glucose as ordered  - Assess for signs and symptoms of hyperglycemia and hypoglycemia  - Administer ordered medications to maintain glucose and severity of pain and evaluate response  - Implement non-pharmacological measures as appropriate and evaluate response  - Consider cultural and social influences on pain and pain management  - Manage/alleviate anxiety  - Utilize distraction and/or relax at bedside using yankauer, pt is unable to expectorate phlegm, prn duoneb obtained.

## 2017-12-27 NOTE — WOUND PROGRESS NOTE
Wound Care Services  Received call from Dr Saundra Milner to reassess the pt's left foot wound for a vac dressing, the pt. was recently medicated for pain, Left foot dressing removed and wound was cleansed, flaco wound remains with erythema, and it has improved, p

## 2017-12-27 NOTE — SLP NOTE
ADULT SWALLOWING EVALUATIONo    ASSESSMENT    ASSESSMENT/OVERALL IMPRESSION:  Pt seen for bedside swallowing evaluation secondary to changes in CXR with extensive right lung opacification to R/O aspiration. The pt was seen at bedside with family present. MEDICAL HISTORY  Reason for Referral: R/O aspiration    Problem List  Principal Problem:    Cellulitis in diabetic foot ([facility]  Active Problems:    Gangrene of toe [facility])      Past Medical History  Past Medical History:   Diagnosis Date   • Diabetes ([facility] aspiration.)    Esophageal Phase of Swallow: No complaints consistent with possible esophageal involvement                GOALS  Goal #1 The patient will tolerate general solid consistency and thin liquids without overt signs or symptoms of aspiration with

## 2017-12-27 NOTE — PLAN OF CARE
Problem: SKIN/TISSUE INTEGRITY - ADULT  Goal: Skin integrity remains intact  INTERVENTIONS  - Assess and document skin integrity  - Monitor for areas of redness and/or skin breakdown  - Initiate interventions, skin care algorithm/standards of care as neede

## 2017-12-27 NOTE — WOUND PROGRESS NOTE
Wound Care Services  Placed call to Dr Mable Napier to discuss left foot wound care, vac dressing, message left.

## 2017-12-27 NOTE — PROGRESS NOTES
Alonso Inman is a 58year old male. Patient presents with:  Cellulitis (integumentary, infectious)      HPI:    Atypical chest cough and discomfort    REVIEW OF SYSTEMS:   A comprehensive 11 point review of systems was completed.   Pertinent positives a control, Glycemic Hgb 11  - per PCP.     3.  Leukocytosis  -sec to above  -increased from 14.8K to 29.4K and now better at 20k  -Afebrile  -Repeat blood cultures x 2 sets negative so far  -will continue to trend   4. rn to address the chest issues[notified Alkaline Phosphatase 68 32 - 100 U/L   Bilirubin, Total 1.1 0.3 - 1.2 mg/dL   Total Protein 6.5 5.9 - 8.4 g/dL   Albumin 2.4 (L) 3.5 - 4.8 g/dL   Globulin 4.1 (H) 2.5 - 3.7 g/dL   A/G Ratio 0.6 (L) 1.0 - 2.0   Anion Gap 9 0 - 18 mmol/L   BUN/CREA Ratio 1 140 - 400 K/UL   MPV 11.6 (H) 7.4 - 10.3 fL   -MAGNESIUM   Result Value Ref Range   Magnesium 1.8 1.8 - 2.5 mg/dL   -BASIC METABOLIC PANEL (8)   Result Value Ref Range   Glucose 221 (H) 70 - 99 mg/dL   Sodium 136 136 - 144 mmol/L   Potassium 4.0 3.3 - 5.1 Embedded Images     Clinical Information I96 Gangrene Of Toe (Hcc).    [FORMATTING REMOVED]    Gross Description Specimen A is received in formalin labeled \"Johana, nonviable skin left   foot\" and consists of a 6.0 x 3.5 cm unoriented irregular portion o Range   POC Glucose  254 (H) 70 - 99   -POCT GLUCOSE   Result Value Ref Range   POC Glucose  209 (H) 70 - 99   -POCT GLUCOSE   Result Value Ref Range   POC Glucose  254 (H) 70 - 99   -POCT GLUCOSE   Result Value Ref Range   POC Glucose  297 (H) 70 - 99   - Lymphocyte Absolute 0.9 (L) 1.0 - 4.0 K/UL   Monocyte Absolute 1.1 (H) 0.0 - 1.0 K/UL   Eosinophil Absolute 0.1 0.0 - 0.7 K/UL   Basophil Absolute 0.1 0.0 - 0.2 K/UL   -CBC W/ DIFFERENTIAL   Result Value Ref Range   WBC 14.8 (H) 4.0 - 11.0 K/UL   RBC 4.1

## 2017-12-28 LAB
ANION GAP SERPL CALC-SCNC: 8 MMOL/L (ref 0–18)
BUN SERPL-MCNC: 11 MG/DL (ref 8–20)
BUN/CREAT SERPL: 10.1 (ref 10–20)
CALCIUM SERPL-MCNC: 7.9 MG/DL (ref 8.5–10.5)
CHLORIDE SERPL-SCNC: 103 MMOL/L (ref 95–110)
CO2 SERPL-SCNC: 24 MMOL/L (ref 22–32)
CREAT SERPL-MCNC: 1.09 MG/DL (ref 0.5–1.5)
ERYTHROCYTE [DISTWIDTH] IN BLOOD BY AUTOMATED COUNT: 13.5 % (ref 11–15)
GLUCOSE BLDC GLUCOMTR-MCNC: 152 MG/DL (ref 70–99)
GLUCOSE BLDC GLUCOMTR-MCNC: 187 MG/DL (ref 70–99)
GLUCOSE BLDC GLUCOMTR-MCNC: 249 MG/DL (ref 70–99)
GLUCOSE BLDC GLUCOMTR-MCNC: 323 MG/DL (ref 70–99)
GLUCOSE SERPL-MCNC: 261 MG/DL (ref 70–99)
HCT VFR BLD AUTO: 36.8 % (ref 41–52)
HGB BLD-MCNC: 12.2 G/DL (ref 13.5–17.5)
MAGNESIUM SERPL-MCNC: 1.8 MG/DL (ref 1.8–2.5)
MCH RBC QN AUTO: 27.4 PG (ref 27–32)
MCHC RBC AUTO-ENTMCNC: 33.1 G/DL (ref 32–37)
MCV RBC AUTO: 82.6 FL (ref 80–100)
OSMOLALITY UR CALC.SUM OF ELEC: 288 MOSM/KG (ref 275–295)
PLATELET # BLD AUTO: 192 K/UL (ref 140–400)
PMV BLD AUTO: 11.3 FL (ref 7.4–10.3)
POTASSIUM SERPL-SCNC: 3.5 MMOL/L (ref 3.3–5.1)
POTASSIUM SERPL-SCNC: 4.3 MMOL/L (ref 3.3–5.1)
RBC # BLD AUTO: 4.46 M/UL (ref 4.5–5.9)
SODIUM SERPL-SCNC: 135 MMOL/L (ref 136–144)
VANCOMYCIN TROUGH SERPL-MCNC: 13.7 MCG/ML (ref 10–20)
WBC # BLD AUTO: 15.9 K/UL (ref 4–11)

## 2017-12-28 PROCEDURE — 80202 ASSAY OF VANCOMYCIN: CPT | Performed by: HOSPITALIST

## 2017-12-28 PROCEDURE — 97166 OT EVAL MOD COMPLEX 45 MIN: CPT

## 2017-12-28 PROCEDURE — 80048 BASIC METABOLIC PNL TOTAL CA: CPT | Performed by: HOSPITALIST

## 2017-12-28 PROCEDURE — 97162 PT EVAL MOD COMPLEX 30 MIN: CPT

## 2017-12-28 PROCEDURE — 85027 COMPLETE CBC AUTOMATED: CPT | Performed by: HOSPITALIST

## 2017-12-28 PROCEDURE — 97608 NEG PRS WND THER NDME>50SQCM: CPT

## 2017-12-28 PROCEDURE — 94640 AIRWAY INHALATION TREATMENT: CPT

## 2017-12-28 PROCEDURE — 83735 ASSAY OF MAGNESIUM: CPT | Performed by: HOSPITALIST

## 2017-12-28 PROCEDURE — 82962 GLUCOSE BLOOD TEST: CPT

## 2017-12-28 PROCEDURE — 84132 ASSAY OF SERUM POTASSIUM: CPT | Performed by: HOSPITALIST

## 2017-12-28 RX ORDER — INSULIN ASPART 100 [IU]/ML
12 INJECTION, SOLUTION INTRAVENOUS; SUBCUTANEOUS ONCE
Status: COMPLETED | OUTPATIENT
Start: 2017-12-28 | End: 2017-12-28

## 2017-12-28 RX ORDER — MAGNESIUM OXIDE 400 MG (241.3 MG MAGNESIUM) TABLET
400 TABLET ONCE
Status: COMPLETED | OUTPATIENT
Start: 2017-12-28 | End: 2017-12-28

## 2017-12-28 RX ORDER — POTASSIUM CHLORIDE 20 MEQ/1
40 TABLET, EXTENDED RELEASE ORAL EVERY 4 HOURS
Status: COMPLETED | OUTPATIENT
Start: 2017-12-28 | End: 2017-12-28

## 2017-12-28 NOTE — PROGRESS NOTES
12/28/17  0822   BP: 147/91   Pulse: 88   Resp: 20   Temp: 98.6 °F (37 °C)   Patient was seen resting comfortably in bed he was up in his chair he hit his foot had a little bit of pain.   He has not complained of too much pain right now and he has no fever

## 2017-12-28 NOTE — WOUND PROGRESS NOTE
WOUND CARE NOTE      PLAN   Recommendations:  Dr. Lanny Alvarenga and Dr Tanya Jason are following the pt.  currently on consult  Dietary consult for recommendations for nutrition to optimize wound healing- following  Diabetic Education for optimal glucose control- f home vac paperwork to [facility] and he will need his insurance to approve the home vac and a home health agency to change the vac. Will need to monitor closely as the periwound is extremely fragile and the vac may not be the best line of therapy.  Plan for re ass

## 2017-12-28 NOTE — PROGRESS NOTES
[facility] Hospitalist Progress Note     PCP: None Pcp    CC: Follow up       Assessment/Plan:     Mr. Miranda Mnotoya is a 59 yo M with PMH of DM2- insulin dependent, HTN, stroke who presented with worsening L foot pain and swelling after injury to his foot a few days a patient's clinical course.   [facility]     Patient and/or patient's family given opportunity to ask questions and note understanding and agreeing with therapeutic plan as outlined    Thank You,  Travis Lawson Glucose-Vitamin C, Normal Saline Flush, ondansetron HCl, HYDROcodone-acetaminophen, Normal Saline Flush, dextrose 50%, Glucose-Vitamin C, ondansetron HCl    Data Review:       Labs:     Recent Labs   Lab  12/24/17   2030  12/25/17   0603  12/26/17   7459

## 2017-12-28 NOTE — PLAN OF CARE
Problem: Diabetes/Glucose Control  Goal: Glucose maintained within prescribed range  INTERVENTIONS:  - Monitor Blood Glucose as ordered  - Assess for signs and symptoms of hyperglycemia and hypoglycemia  - Administer ordered medications to maintain glucose to monitor pain and request assistance  - Assess pain using appropriate pain scale  - Administer analgesics based on type and severity of pain and evaluate response  - Implement non-pharmacological measures as appropriate and evaluate response  - Consider given for pain, vanco trough 13.7-same dose of vanco per pharmacist.

## 2017-12-28 NOTE — PROGRESS NOTES
[facility] AND [facility] Infectious Disease Progress Note    Maxi Gray Patient Status:  Inpatient    1955 MRN [id number]   Location [facility] 5SW/SE Attending Maurisio Johnston MD   Hosp Day # 4 PCP None Pcp     Subjective:  Pt complains 4 mg, 4 mg, Intravenous, Q6H PRN  •  Cefepime HCl (MAXIPIME) 1 g in sodium chloride 0.9 % 100 mL IVPB, 1 g, Intravenous, Q8H    Physical Exam:  General: Alert, orientated x3. Cooperative. No apparent distress.   Vital Signs:  Blood pressure 147/91, pulse aspiration  -on abx above  3. Leukocytosis  -trending back down  4. DM  -strict glycemic control for optimal healing      If you have any questions or concerns please call [facility] at [phone].      IKE YBARRA NP  12/28/2017  10:20 AM

## 2017-12-28 NOTE — PROGRESS NOTES
Date:  12/28/2017  Drug/Duration:   Cefepime started on 12/24 now D#5  Flagyl po added 12/27  Vancomycin: Started on 12./24, now D#5,  Next level in 5-7 days. Indication: Cellulitis/pnuemonia? Necrotic 5th toe 2/2 trauma.   Possible post-op aspiration pneu

## 2017-12-28 NOTE — PROGRESS NOTES
[facility] Hospitalist Progress Note     PCP: None Pcp    CC: Follow up       Assessment/Plan:     Mr. Yanique Villarreal is a 57 yo M with PMH of DM2- insulin dependent, HTN, stroke who presented with worsening L foot pain and swelling after injury to his foot a few days a clinical course        Further recommendations pending patient's clinical course.   [facility] hospitalist to continue to follow patient while in house     Patient and/or patient's family given opportunity to ask questions and note understanding and agreeing with Intravenous Q8H       Normal Saline Flush, dextrose 50%, Glucose-Vitamin C, Normal Saline Flush, ondansetron HCl, HYDROcodone-acetaminophen, Normal Saline Flush, dextrose 50%, Glucose-Vitamin C, ondansetron HCl    Data Review:       Labs:     Recent Labs

## 2017-12-28 NOTE — OCCUPATIONAL THERAPY NOTE
OCCUPATIONAL THERAPY EVALUATION - INPATIENT     Room Number: 554/554-A  Evaluation Date: 12/28/2017  Type of Evaluation: Initial  Presenting Problem: L 5th toe amputation    Physician Order: IP Consult to Occupational Therapy  Reason for Therapy: ADL/IADL RECOMMENDATIONS  OT Discharge Recommendations: 24 hour care/supervision;Home       OCCUPATIONAL THERAPY MEDICAL/SOCIAL HISTORY     Problem List  Principal Problem:    Cellulitis in diabetic foot ([address]  Active Problems:    Gangrene of toe ([address]      Past Me Putting on and taking off regular lower body clothing?: A Lot  -   Bathing (including washing, rinsing, drying)?: A Little  -   Toileting, which includes using toilet, bedpan or urinal? : A Little  -   Putting on and taking off regular upper body clothing?

## 2017-12-28 NOTE — PHYSICAL THERAPY NOTE
PHYSICAL THERAPY EVALUATION - INPATIENT     Room Number: 554/554-A  Evaluation Date: 12/28/2017  Type of Evaluation: Initial  Physician Order: PT Eval and Treat    Presenting Problem: L toe amputation cellulitis DM  Reason for Therapy: Mobility Dysfunc History  History reviewed. No pertinent surgical history.     HOME SITUATION  Type of Home: House   Home Layout: One level                Lives With: Family  Drives: No     Patient Regularly Uses: None    Prior Level of [place]: Indep    SUBJECTIVE  I Score:  Raw Score: 20   PT Approx Degree of Impairment Score: 35.83%   Standardized Score (AM-PAC Scale): 47.67   CMS Modifier (G-Code): CJ    FUNCTIONAL ABILITY STATUS  Gait Assessment   Gait Assistance: Minimum assistance  Distance (ft): 10  Assistive De

## 2017-12-28 NOTE — CM/SW NOTE
Sw attempted to meet w/ pt to discuss d/c plans. Pt was asleep and would not awake to SW voice. SW to follow up when appropriate.     Jose Lobato, [address]

## 2017-12-28 NOTE — PROGRESS NOTES
12/27/17  1641   BP: 121/77   Pulse: 97   Resp: 18   Temp: 97.7 °F (36.5 °C)   Patient was seen resting comfortably in bed he is still having pain that requires pain medication.   He is now 2 days status post debridement on his left foot and is one day sta

## 2017-12-28 NOTE — PAYOR COMM NOTE
--------------  ADMISSION REVIEW     Payor: Mario Salcedo #:  [id number]  Authorization Number: [id number]    Admit date: 12/24/17  Admit time: 2218         Patient Seen in: [facility] Emergency Department capsule (500 mg total) by mouth 4 (four) times daily.  Disp: 28 capsule Rfl: 0 12/24/2017 at Unknown time      Pertinent Positives: Arthralgias, skin rash  ED Triage Vitals [12/24/17 1952]  BP: 136/77  Pulse: 96  Resp: 18  Temp: 98.2 °F (36.8 °C)  Temp src: 11.3 (*)     Neutrophil Absolute 11.3 (*)     Lymphocyte Absolute 0.9 (*)     Monocyte Absolute 1.1 (*)        Imaging Results Available and Reviewed while in ED:     ED Medications Administered:   Medications   ondansetron HCl (ZOFRAN) injection 4 mg (not Height: 182.9 cm (6')  182.9 cm (6' 0.01\")     Clinical Impression:  Cellulitis in diabetic foot ([facility] [address]  (primary encounter diagnosis)  Gangrene of toe ([facility]    Disposition:  Admit            [facility] Hospitalist H&P       CC: Patient presents with:  Kb Carrillo Bj Pérez  History reviewed. No pertinent surgical history.      ALL:  No Known Allergies     Home Medications:    Outpatient Prescriptions Marked as Taking for the 12/24/17 encounter [facility] Encounter):  Liraglutide (VICTOZA) 18 MG/3ML Subcutaneous Solutio MCV  82.4  83.1   MCH  27.9  27.8   MCHC  33.8  33.4   RDW  13.3  13.2   WBC  13.5*  14.8*   PLT  126*  143         Recent Labs   Lab  12/24/17 2030 12/25/17   0603   GLU  321*  204*   BUN  21*  17   CREATSERUM  1.56*  1.35   GFRAA  55*  >60   GFRNAA Subcutaneous, Q8H [address]  •  ondansetron HCl (ZOFRAN) injection 4 mg, 4 mg, Intravenous, Q6H PRN  •  Insulin Aspart Pen (NOVOLOG) 100 UNIT/ML flexpen 1-7 Units, 1-7 Units, Subcutaneous, TID CC  •  Cefepime HCl (MAXIPIME) 1 g in sodium chloride 0.9 % 100 mL IV including his wife and son were there that this needs to have incision and drainage of infection and amputation of the fifth ray at least partially.   The nature and extent of the surgery the necessity for the possible complications and risks including loss listed  -1.56 on admisison->1.52->angio->1.39  - monitor closely post angio     DM2  - home regimen: basaglar 70 units daily, victoza 1.2 mg daily, metformin 500 mg daily - hold  - accuchecks QID, hypoglycemic protocol, SSI  - wife thinks last HgA1c 11, re vancomycin  1,250 mg Intravenous Q12H   • aspirin  81 mg Oral Daily   • Insulin Aspart Pen  1-11 Units Subcutaneous TID CC   • Heparin Sodium (Porcine)  5,000 Units Subcutaneous Q8H [facility]   • cefepime  1 g Intravenous Q8H         Normal Saline Flushca angio on 12/26 s/p intevention to occluded AT and distal peroneal arteries.   plavix restarted     Post op hypoxia due to aspiration  -initially on NRB post extubation after surgery with Dr Larry Kay  -weaned down to 5L ->2L->1L  -push IS  -CXR with extensive Recent Labs   Lab  12/26/17   0528  12/27/17   0550  12/28/17   0305   WBC  29.4*  20.2*  15.9*   HGB  12.6*  11.8*  12.2*   MCV  83.4  83.2  82.6   PLT  200  183  192               Recent Labs   Lab  12/26/17   0528  12/27/17   0550  12/28/17   0305   N

## 2017-12-29 ENCOUNTER — APPOINTMENT (OUTPATIENT)
Dept: GENERAL RADIOLOGY | Facility: HOSPITAL | Age: 62
DRG: 616 | End: 2017-12-29
Attending: HOSPITALIST
Payer: MEDICAID

## 2017-12-29 LAB
ANION GAP SERPL CALC-SCNC: 6 MMOL/L (ref 0–18)
BASOPHILS # BLD: 0.1 K/UL (ref 0–0.2)
BASOPHILS NFR BLD: 1 %
BUN SERPL-MCNC: 9 MG/DL (ref 8–20)
BUN/CREAT SERPL: 8 (ref 10–20)
CALCIUM SERPL-MCNC: 8.1 MG/DL (ref 8.5–10.5)
CHLORIDE SERPL-SCNC: 105 MMOL/L (ref 95–110)
CO2 SERPL-SCNC: 26 MMOL/L (ref 22–32)
CREAT SERPL-MCNC: 1.12 MG/DL (ref 0.5–1.5)
EOSINOPHIL # BLD: 0.1 K/UL (ref 0–0.7)
EOSINOPHIL NFR BLD: 1 %
ERYTHROCYTE [DISTWIDTH] IN BLOOD BY AUTOMATED COUNT: 13.7 % (ref 11–15)
GLUCOSE BLDC GLUCOMTR-MCNC: 260 MG/DL (ref 70–99)
GLUCOSE BLDC GLUCOMTR-MCNC: 271 MG/DL (ref 70–99)
GLUCOSE BLDC GLUCOMTR-MCNC: 288 MG/DL (ref 70–99)
GLUCOSE BLDC GLUCOMTR-MCNC: 292 MG/DL (ref 70–99)
GLUCOSE BLDC GLUCOMTR-MCNC: 333 MG/DL (ref 70–99)
GLUCOSE SERPL-MCNC: 256 MG/DL (ref 70–99)
HCT VFR BLD AUTO: 35.7 % (ref 41–52)
HGB BLD-MCNC: 11.8 G/DL (ref 13.5–17.5)
LYMPHOCYTES # BLD: 1.4 K/UL (ref 1–4)
LYMPHOCYTES NFR BLD: 10 %
MAGNESIUM SERPL-MCNC: 1.7 MG/DL (ref 1.8–2.5)
MCH RBC QN AUTO: 27.4 PG (ref 27–32)
MCHC RBC AUTO-ENTMCNC: 33.2 G/DL (ref 32–37)
MCV RBC AUTO: 82.7 FL (ref 80–100)
MONOCYTES # BLD: 1 K/UL (ref 0–1)
MONOCYTES NFR BLD: 7 %
NEUTROPHILS # BLD AUTO: 11.2 K/UL (ref 1.8–7.7)
NEUTROPHILS NFR BLD: 81 %
OSMOLALITY UR CALC.SUM OF ELEC: 291 MOSM/KG (ref 275–295)
PLATELET # BLD AUTO: 183 K/UL (ref 140–400)
PMV BLD AUTO: 10.6 FL (ref 7.4–10.3)
POTASSIUM SERPL-SCNC: 4.1 MMOL/L (ref 3.3–5.1)
RBC # BLD AUTO: 4.31 M/UL (ref 4.5–5.9)
SODIUM SERPL-SCNC: 137 MMOL/L (ref 136–144)
WBC # BLD AUTO: 13.8 K/UL (ref 4–11)

## 2017-12-29 PROCEDURE — 82962 GLUCOSE BLOOD TEST: CPT

## 2017-12-29 PROCEDURE — 80048 BASIC METABOLIC PNL TOTAL CA: CPT | Performed by: HOSPITALIST

## 2017-12-29 PROCEDURE — 92526 ORAL FUNCTION THERAPY: CPT

## 2017-12-29 PROCEDURE — 83735 ASSAY OF MAGNESIUM: CPT | Performed by: HOSPITALIST

## 2017-12-29 PROCEDURE — 94640 AIRWAY INHALATION TREATMENT: CPT

## 2017-12-29 PROCEDURE — 71010 XR CHEST AP PORTABLE  (CPT=71010): CPT | Performed by: HOSPITALIST

## 2017-12-29 PROCEDURE — 99212 OFFICE O/P EST SF 10 MIN: CPT

## 2017-12-29 PROCEDURE — 85025 COMPLETE CBC W/AUTO DIFF WBC: CPT | Performed by: HOSPITALIST

## 2017-12-29 RX ORDER — FUROSEMIDE 10 MG/ML
20 INJECTION INTRAMUSCULAR; INTRAVENOUS ONCE
Status: COMPLETED | OUTPATIENT
Start: 2017-12-29 | End: 2017-12-29

## 2017-12-29 RX ORDER — MAGNESIUM SULFATE HEPTAHYDRATE 40 MG/ML
2 INJECTION, SOLUTION INTRAVENOUS ONCE
Status: COMPLETED | OUTPATIENT
Start: 2017-12-29 | End: 2017-12-29

## 2017-12-29 NOTE — SLP NOTE
SPEECH DAILY NOTE - INPATIENT    ASSESSMENT & PLAN   ASSESSMENT  Pt repositioned upright in bed to 90 degrees for a meal assessment of recommended general diet/ thin liquids with no straw. Pt denies any difficulties with swallowing.  SLP reviewed safe swall understanding and implementation of aspiration precautions and swallow strategies independently over 2 session(s). Pt and wife with good retention of aspiration precautions and swallow strategies. Continue to reinforce.    In Progress   Goal #3 The patie

## 2017-12-29 NOTE — PROGRESS NOTES
[facility]  Vascular Surgery Progress Note    Rossana Cartagena Patient Status:  Inpatient    1955 MRN [id number]   Location [facility] 5SW/SE Attending Mike Balbuena MD   Hosp Day # 5 PCP None Pcp     Objective:   Temp: 98.7 °F (37.1 Q8H   Normal Saline Flush 0.9 % injection 10 mL 10 mL Intravenous PRN   Clopidogrel Bisulfate (PLAVIX) tab 75 mg 75 mg Oral Daily   Vancomycin HCl (VANCOCIN) 1,250 mg in sodium chloride 0.9 % 500 mL IVPB 1,250 mg Intravenous Q12H   aspirin chewable tab 81 PM

## 2017-12-29 NOTE — WOUND PROGRESS NOTE
Wound Care Services  Follow up on the pt., spoke with Dr Jose Wu 11:30am, he was going to come and remove the left foot vac and assess the wound, received voicemail from the pt's nurse at 11:52 that Dr Jose Wu assessed the left foot wound and wants the vac

## 2017-12-29 NOTE — PLAN OF CARE
Problem: Diabetes/Glucose Control  Goal: Glucose maintained within prescribed range  INTERVENTIONS:  - Monitor Blood Glucose as ordered  - Assess for signs and symptoms of hyperglycemia and hypoglycemia  - Administer ordered medications to maintain glucose with relief. Problem: SAFETY ADULT - FALL  Goal: Free from fall injury  INTERVENTIONS:  - Assess pt frequently for physical needs  - Identify cognitive and physical deficits and behaviors that affect risk of falls.   - [place] fall precautions as gricel

## 2017-12-29 NOTE — PLAN OF CARE
Problem: Diabetes/Glucose Control  Goal: Glucose maintained within prescribed range  INTERVENTIONS:  - Monitor Blood Glucose as ordered  - Assess for signs and symptoms of hyperglycemia and hypoglycemia  - Administer ordered medications to maintain glucose evaluate response  - Consider cultural and social influences on pain and pain management  - Manage/alleviate anxiety  - Utilize distraction and/or relaxation techniques  - Monitor for opioid side effects  - Notify MD/LIP if interventions unsuccessful or pa pain, continues on vanco/cefepime/flagyl po, all needs met, family at bedside, bed kept low and locked, call light left within reach.

## 2017-12-29 NOTE — PROGRESS NOTES
[facility] AND [facility] Infectious Disease  Progress Note    Bettina Coyle Patient Status:  Inpatient    1955 MRN [id number]   Location [facility] 5SW/SE Attending Samm Love MD   Hosp Day # 5 PCP None Pcp     Subjective:  Patient seen no drainage, wound may not be viable - may need further surgery  - Vascular and podiatry following    2. Pneumonia - symptomatically improving  - CXR with R sided opacification  - Possible post-op aspiration  - Antibiotics as above    3.   Leukocytosis due

## 2017-12-29 NOTE — CM/SW NOTE
KELSEY met w/ pt and pt's wife to discuss discharge planning. Pt lives at home w/ wife and son and is independent w/ ADL's. KELSEY discussed PT rec of HHC vs SNF. Pt stated he would like to return home. [facility]; pt agreeable.  SW discus

## 2017-12-29 NOTE — PROGRESS NOTES
12/29/17  0904   BP: 119/75   Pulse: 103   Resp: 22   Temp: 98.7 °F (37.1 °C)   Patient was seen resting comfortably in bed still has quite a bit of foot pain. The wound VAC is in place. There is no drainage. Objective:  Today the wound VAC was remove

## 2017-12-30 ENCOUNTER — APPOINTMENT (OUTPATIENT)
Dept: GENERAL RADIOLOGY | Facility: HOSPITAL | Age: 62
DRG: 616 | End: 2017-12-30
Attending: HOSPITALIST
Payer: MEDICAID

## 2017-12-30 LAB
ANION GAP SERPL CALC-SCNC: 7 MMOL/L (ref 0–18)
BASOPHILS # BLD: 0.1 K/UL (ref 0–0.2)
BASOPHILS NFR BLD: 0 %
BUN SERPL-MCNC: 11 MG/DL (ref 8–20)
BUN/CREAT SERPL: 9.8 (ref 10–20)
CALCIUM SERPL-MCNC: 8.1 MG/DL (ref 8.5–10.5)
CHLORIDE SERPL-SCNC: 98 MMOL/L (ref 95–110)
CO2 SERPL-SCNC: 29 MMOL/L (ref 22–32)
CREAT SERPL-MCNC: 1.12 MG/DL (ref 0.5–1.5)
EOSINOPHIL # BLD: 0.2 K/UL (ref 0–0.7)
EOSINOPHIL NFR BLD: 1 %
ERYTHROCYTE [DISTWIDTH] IN BLOOD BY AUTOMATED COUNT: 13.6 % (ref 11–15)
GLUCOSE BLDC GLUCOMTR-MCNC: 128 MG/DL (ref 70–99)
GLUCOSE BLDC GLUCOMTR-MCNC: 139 MG/DL (ref 70–99)
GLUCOSE BLDC GLUCOMTR-MCNC: 245 MG/DL (ref 70–99)
GLUCOSE BLDC GLUCOMTR-MCNC: 247 MG/DL (ref 70–99)
GLUCOSE SERPL-MCNC: 157 MG/DL (ref 70–99)
HCT VFR BLD AUTO: 35.9 % (ref 41–52)
HGB BLD-MCNC: 12 G/DL (ref 13.5–17.5)
LYMPHOCYTES # BLD: 1.6 K/UL (ref 1–4)
LYMPHOCYTES NFR BLD: 11 %
MAGNESIUM SERPL-MCNC: 1.7 MG/DL (ref 1.8–2.5)
MCH RBC QN AUTO: 27.5 PG (ref 27–32)
MCHC RBC AUTO-ENTMCNC: 33.4 G/DL (ref 32–37)
MCV RBC AUTO: 82.3 FL (ref 80–100)
MONOCYTES # BLD: 1.1 K/UL (ref 0–1)
MONOCYTES NFR BLD: 8 %
NEUTROPHILS # BLD AUTO: 11.4 K/UL (ref 1.8–7.7)
NEUTROPHILS NFR BLD: 79 %
OSMOLALITY UR CALC.SUM OF ELEC: 281 MOSM/KG (ref 275–295)
PLATELET # BLD AUTO: 194 K/UL (ref 140–400)
PMV BLD AUTO: 11.7 FL (ref 7.4–10.3)
POTASSIUM SERPL-SCNC: 3.6 MMOL/L (ref 3.3–5.1)
RBC # BLD AUTO: 4.37 M/UL (ref 4.5–5.9)
SODIUM SERPL-SCNC: 134 MMOL/L (ref 136–144)
WBC # BLD AUTO: 14.4 K/UL (ref 4–11)

## 2017-12-30 PROCEDURE — 82962 GLUCOSE BLOOD TEST: CPT

## 2017-12-30 PROCEDURE — 97116 GAIT TRAINING THERAPY: CPT

## 2017-12-30 PROCEDURE — 74230 X-RAY XM SWLNG FUNCJ C+: CPT | Performed by: HOSPITALIST

## 2017-12-30 PROCEDURE — 85025 COMPLETE CBC W/AUTO DIFF WBC: CPT | Performed by: HOSPITALIST

## 2017-12-30 PROCEDURE — 83735 ASSAY OF MAGNESIUM: CPT | Performed by: HOSPITALIST

## 2017-12-30 PROCEDURE — 80048 BASIC METABOLIC PNL TOTAL CA: CPT | Performed by: HOSPITALIST

## 2017-12-30 PROCEDURE — 92611 MOTION FLUOROSCOPY/SWALLOW: CPT

## 2017-12-30 PROCEDURE — 94640 AIRWAY INHALATION TREATMENT: CPT

## 2017-12-30 RX ORDER — POTASSIUM CHLORIDE 20 MEQ/1
40 TABLET, EXTENDED RELEASE ORAL EVERY 4 HOURS
Status: COMPLETED | OUTPATIENT
Start: 2017-12-30 | End: 2017-12-30

## 2017-12-30 RX ORDER — MAGNESIUM OXIDE 400 MG (241.3 MG MAGNESIUM) TABLET
400 TABLET ONCE
Status: COMPLETED | OUTPATIENT
Start: 2017-12-30 | End: 2017-12-30

## 2017-12-30 NOTE — PROCEDURES
[facility]    PATIENT'S NAME: Darriansulaiman Bachelor   ATTENDING PHYSICIAN: Rosa Cifuentes MD   OPERATING PHYSICIAN: Cari Gallego MD   PATIENT ACCOUNT#:   [id number]    LOCATION:  [address] [place] [address] #:   [id number]       DATE OF BIRTH: right common femoral artery over which a micro sheath was then advanced which was then exchanged to a 5-Pashto sheath. An IM catheter was then advanced into the distal aorta where a quick distal aortic angiogram was performed.   This revealed wide patency resistant. I could not advance the balloon any further given we were going up and over.   The patient did have severe small vessel disease in his foot because, despite filling the posterior tibial artery and the peroneal artery, he still had marginal flow

## 2017-12-30 NOTE — PHYSICAL THERAPY NOTE
PHYSICAL THERAPY TREATMENT NOTE - INPATIENT    Room Number: 554/554-A       Presenting Problem: L toe amputation cellulitis DM    Problem List  Principal Problem:    Cellulitis in diabetic foot ([address]  Active Problems:    Gangrene of toe (HCC)      ASSESSM Bearing Restriction: L lower extremity           L Lower Extremity: Non-Weight Bearing    PAIN ASSESSMENT   Ratin  Location: Left forefoot  Management Techniques:  Activity promotion    BALANCE reach;RN aware of session/findings; All patient questions and concerns addressed; Family present    CURRENT GOALS:  Goals to be met by:  wks  Patient Goal Patient's self-stated goal is: Return home   Goal #1 Patient is able to demonstrate supine - sit EOB @

## 2017-12-30 NOTE — PLAN OF CARE
Problem: Diabetes/Glucose Control  Goal: Glucose maintained within prescribed range  INTERVENTIONS:  - Monitor Blood Glucose as ordered  - Assess for signs and symptoms of hyperglycemia and hypoglycemia  - Administer ordered medications to maintain glucose evaluate response  - Consider cultural and social influences on pain and pain management  - Manage/alleviate anxiety  - Utilize distraction and/or relaxation techniques  - Monitor for opioid side effects  - Notify MD/LIP if interventions unsuccessful or pa MD padmaja notified, ordered stat repeat CXR and one time dose of Lasix, ST recommended f/u video swallow, order received, still to have test done, pain resolved at 0930 per pt report, magnesium of 1.7 covered per IV electrolyte protocol, accuchecks AC&HS-

## 2017-12-30 NOTE — PROGRESS NOTES
[facility] AND [facility] Infectious Disease Progress Note    Andriy Book Patient Status:  Inpatient    1955 MRN [id number]   Location [facility] 5SW/SE Attending Jose Dewitt MD   Hosp Day # 6 PCP None Pcp     Subjective:  Pt complains 5,000 Units, Subcutaneous, Q8H [address]  •  ondansetron HCl (ZOFRAN) injection 4 mg, 4 mg, Intravenous, Q6H PRN  •  Cefepime HCl (MAXIPIME) 1 g in sodium chloride 0.9 % 100 mL IVPB, 1 g, Intravenous, Q8H    Physical Exam:  General: Alert, orientated x3.   Angelica Lorenz cefepime and PO flagyl  2. PNA  -CXR with R opacification  -possibly post-op aspiration  -on abx above  3. Leukocytosis  -trending back down  4.   DM  -strict glycemic control for optimal healing      If you have any questions or concerns please call [facility]-

## 2017-12-30 NOTE — SLP NOTE
ADULT VIDEOFLUOROSCOPIC SWALLOWING STUDY    Admission Date: 12/24/2017  Evaluation Date: 12/30/17  Radiologist:  Dr. Yobany Arndt:    No further dysphagia therapy required. Oral and pharyngeal swallowing skills WNL.  Pt to remain on solid/thin liquid compensatory strategies to improve safe swallow function. THIN LIQUIDS  Method of Presentation: Cup;Straw  Oral Phase of Swallow (VFSS - Thin Liquids):  Within Functional Limits  Triggered at: Base of tongue  Laryngeal Penetration: None  Tracheal Aspirat

## 2017-12-30 NOTE — PROGRESS NOTES
[facility] Hospitalist Progress Note     PCP: None Pcp    CC: Follow up       Assessment/Plan:     Mr. Orlando Leon is a 59 yo M with PMH of DM2- insulin dependent, HTN, stroke who presented with worsening L foot pain and swelling after injury to his foot a few days a HSQ  Lines: PIV     Dispo: pending clinical course        Further recommendations pending patient's clinical course.   [facility] hospitalist to continue to follow patient while in house     Patient and/or patient's family given opportunity to ask questions and no CC   • Heparin Sodium (Porcine)  5,000 Units Subcutaneous Q8H [facility]term   • cefepime  1 g Intravenous Q8H       Normal Saline Flush, dextrose 50%, Glucose-Vitamin C, Normal Saline Flush, ondansetron HCl, HYDROcodone-acetaminophen, Normal Saline Flush, dextrose 5

## 2017-12-30 NOTE — PROGRESS NOTES
[facility] Hospitalist Progress Note     PCP: None Pcp    CC: Follow up       Assessment/Plan:     Mr. Rohan Dumont is a 57 yo M with PMH of DM2- insulin dependent, HTN, stroke who presented with worsening L foot pain and swelling after injury to his foot a few days a clinical course        Further recommendations pending patient's clinical course.   [facility] hospitalist to continue to follow patient while in house     Patient and/or patient's family given opportunity to ask questions and note understanding and agreeing with Subcutaneous Q8H [facility] group home   • cefepime  1 g Intravenous Q8H       Normal Saline Flush, dextrose 50%, Glucose-Vitamin C, Normal Saline Flush, ondansetron HCl, HYDROcodone-acetaminophen, Normal Saline Flush, dextrose 50%, Glucose-Vitamin C, ondansetron HCl    Marty

## 2017-12-30 NOTE — PLAN OF CARE
Problem: Diabetes/Glucose Control  Goal: Glucose maintained within prescribed range  INTERVENTIONS:  - Monitor Blood Glucose as ordered  - Assess for signs and symptoms of hyperglycemia and hypoglycemia  - Administer ordered medications to maintain glucose Initiate isolation precautions as appropriate   Outcome: Progressing  No fevers; WBC elevated; Wound very painful;  Will cont to monitor     Problem: PAIN - ADULT  Goal: Verbalizes/displays adequate comfort level or patient's stated pain goal  INTERVENTIONS

## 2017-12-31 LAB
ANION GAP SERPL CALC-SCNC: 6 MMOL/L (ref 0–18)
BASOPHILS # BLD: 0.1 K/UL (ref 0–0.2)
BASOPHILS NFR BLD: 1 %
BUN SERPL-MCNC: 13 MG/DL (ref 8–20)
BUN/CREAT SERPL: 11.3 (ref 10–20)
CALCIUM SERPL-MCNC: 8.3 MG/DL (ref 8.5–10.5)
CHLORIDE SERPL-SCNC: 100 MMOL/L (ref 95–110)
CO2 SERPL-SCNC: 27 MMOL/L (ref 22–32)
CREAT SERPL-MCNC: 1.15 MG/DL (ref 0.5–1.5)
EOSINOPHIL # BLD: 0.2 K/UL (ref 0–0.7)
EOSINOPHIL NFR BLD: 2 %
ERYTHROCYTE [DISTWIDTH] IN BLOOD BY AUTOMATED COUNT: 13.8 % (ref 11–15)
GLUCOSE BLDC GLUCOMTR-MCNC: 155 MG/DL (ref 70–99)
GLUCOSE BLDC GLUCOMTR-MCNC: 172 MG/DL (ref 70–99)
GLUCOSE BLDC GLUCOMTR-MCNC: 194 MG/DL (ref 70–99)
GLUCOSE BLDC GLUCOMTR-MCNC: 89 MG/DL (ref 70–99)
GLUCOSE SERPL-MCNC: 127 MG/DL (ref 70–99)
HCT VFR BLD AUTO: 35.9 % (ref 41–52)
HGB BLD-MCNC: 11.8 G/DL (ref 13.5–17.5)
LYMPHOCYTES # BLD: 1.8 K/UL (ref 1–4)
LYMPHOCYTES NFR BLD: 14 %
MAGNESIUM SERPL-MCNC: 1.8 MG/DL (ref 1.8–2.5)
MCH RBC QN AUTO: 27.3 PG (ref 27–32)
MCHC RBC AUTO-ENTMCNC: 32.9 G/DL (ref 32–37)
MCV RBC AUTO: 83 FL (ref 80–100)
MONOCYTES # BLD: 1.2 K/UL (ref 0–1)
MONOCYTES NFR BLD: 9 %
NEUTROPHILS # BLD AUTO: 10.1 K/UL (ref 1.8–7.7)
NEUTROPHILS NFR BLD: 75 %
OSMOLALITY UR CALC.SUM OF ELEC: 278 MOSM/KG (ref 275–295)
PLATELET # BLD AUTO: 203 K/UL (ref 140–400)
PMV BLD AUTO: 11 FL (ref 7.4–10.3)
POTASSIUM SERPL-SCNC: 3.9 MMOL/L (ref 3.3–5.1)
RBC # BLD AUTO: 4.33 M/UL (ref 4.5–5.9)
SODIUM SERPL-SCNC: 133 MMOL/L (ref 136–144)
WBC # BLD AUTO: 13.3 K/UL (ref 4–11)

## 2017-12-31 PROCEDURE — 83735 ASSAY OF MAGNESIUM: CPT | Performed by: HOSPITALIST

## 2017-12-31 PROCEDURE — 97116 GAIT TRAINING THERAPY: CPT

## 2017-12-31 PROCEDURE — 94640 AIRWAY INHALATION TREATMENT: CPT

## 2017-12-31 PROCEDURE — 82962 GLUCOSE BLOOD TEST: CPT

## 2017-12-31 PROCEDURE — 97530 THERAPEUTIC ACTIVITIES: CPT

## 2017-12-31 PROCEDURE — 85025 COMPLETE CBC W/AUTO DIFF WBC: CPT | Performed by: HOSPITALIST

## 2017-12-31 PROCEDURE — 80048 BASIC METABOLIC PNL TOTAL CA: CPT | Performed by: HOSPITALIST

## 2017-12-31 RX ORDER — MAGNESIUM OXIDE 400 MG (241.3 MG MAGNESIUM) TABLET
400 TABLET ONCE
Status: COMPLETED | OUTPATIENT
Start: 2017-12-31 | End: 2017-12-31

## 2017-12-31 RX ORDER — IPRATROPIUM BROMIDE AND ALBUTEROL SULFATE 2.5; .5 MG/3ML; MG/3ML
3 SOLUTION RESPIRATORY (INHALATION)
Status: DISCONTINUED | OUTPATIENT
Start: 2017-12-31 | End: 2018-01-01

## 2017-12-31 NOTE — PROGRESS NOTES
[facility] Hospitalist Progress Note     PCP: None Pcp    CC: Follow up       Assessment/Plan:     Mr. Fidelina Barrios is a 59 yo M with PMH of DM2- insulin dependent, HTN, stroke who presented with worsening L foot pain and swelling after injury to his foot a few days a DM2     DVT Prophy: SCD, HSQ  Lines: PIV     Dispo: pending clinical course        Further recommendations pending patient's clinical course.   [facility] hospitalist to continue to follow patient while in house     Patient and/or patient's family given opportunit Daily   • Insulin Aspart Pen  1-11 Units Subcutaneous TID CC   • Heparin Sodium (Porcine)  5,000 Units Subcutaneous Q8H [address]   • cefepime  1 g Intravenous Q8H       Normal Saline Flush, dextrose 50%, Glucose-Vitamin C, Normal Saline Flush, ondansetron HCl,

## 2017-12-31 NOTE — PLAN OF CARE
Problem: Diabetes/Glucose Control  Goal: Glucose maintained within prescribed range  INTERVENTIONS:  - Monitor Blood Glucose as ordered  - Assess for signs and symptoms of hyperglycemia and hypoglycemia  - Administer ordered medications to maintain glucose fevers; WBC slightly elevated; VSS; Will cont to monitor     Problem: PAIN - ADULT  Goal: Verbalizes/displays adequate comfort level or patient's stated pain goal  INTERVENTIONS:  - Encourage pt to monitor pain and request assistance  - Assess pain using a

## 2017-12-31 NOTE — PROGRESS NOTES
[facility] AND [facility] Infectious Disease  Progress Note    Divine Forward Patient Status:  Inpatient    1955 MRN [id number]   Location [facility] 5SW/SE Attending Fredrick Castañeda MD   Hosp Day # 7 PCP None Pcp     Subjective:  Patient seen/examin Vascular and podiatry following  - IV vancomycin, cefepime, flagyl ongoing     2.   Pneumonia - symptomatically improving  - CXR with R sided opacification  - Possible post-op aspiration  - Antibiotics as above     3.  Leukocytosis due to the above  - Now n

## 2017-12-31 NOTE — PHYSICAL THERAPY NOTE
PHYSICAL THERAPY TREATMENT NOTE - INPATIENT    Room Number: 554/554-A       Presenting Problem: L toe amputation cellulitis DM    Problem List  Principal Problem:    Cellulitis in diabetic foot ([address]  Active Problems:    Gangrene of toe (HCC)      ASSESSM blankets)?: A Little   -   Sitting down on and standing up from a chair with arms (e.g., wheelchair, bedside commode, etc.): A Little   -   Moving from lying on back to sitting on the side of the bed?: A Little   How much help from another person does the Universal Safety Interventions

## 2018-01-01 ENCOUNTER — TELEPHONE (OUTPATIENT)
Dept: INTERNAL MEDICINE CLINIC | Facility: CLINIC | Age: 63
End: 2018-01-01

## 2018-01-01 LAB
ANION GAP SERPL CALC-SCNC: 7 MMOL/L (ref 0–18)
BASOPHILS # BLD: 0.1 K/UL (ref 0–0.2)
BASOPHILS NFR BLD: 1 %
BUN SERPL-MCNC: 14 MG/DL (ref 8–20)
BUN/CREAT SERPL: 11.9 (ref 10–20)
CALCIUM SERPL-MCNC: 8.5 MG/DL (ref 8.5–10.5)
CHLORIDE SERPL-SCNC: 101 MMOL/L (ref 95–110)
CO2 SERPL-SCNC: 27 MMOL/L (ref 22–32)
CREAT SERPL-MCNC: 1.18 MG/DL (ref 0.5–1.5)
EOSINOPHIL # BLD: 0.3 K/UL (ref 0–0.7)
EOSINOPHIL NFR BLD: 2 %
ERYTHROCYTE [DISTWIDTH] IN BLOOD BY AUTOMATED COUNT: 14 % (ref 11–15)
GLUCOSE BLDC GLUCOMTR-MCNC: 111 MG/DL (ref 70–99)
GLUCOSE BLDC GLUCOMTR-MCNC: 260 MG/DL (ref 70–99)
GLUCOSE BLDC GLUCOMTR-MCNC: 345 MG/DL (ref 70–99)
GLUCOSE BLDC GLUCOMTR-MCNC: 361 MG/DL (ref 70–99)
GLUCOSE BLDC GLUCOMTR-MCNC: 438 MG/DL (ref 70–99)
GLUCOSE SERPL-MCNC: 110 MG/DL (ref 70–99)
HCT VFR BLD AUTO: 36.2 % (ref 41–52)
HGB BLD-MCNC: 11.9 G/DL (ref 13.5–17.5)
LYMPHOCYTES # BLD: 1.6 K/UL (ref 1–4)
LYMPHOCYTES NFR BLD: 11 %
MAGNESIUM SERPL-MCNC: 1.8 MG/DL (ref 1.8–2.5)
MCH RBC QN AUTO: 27.3 PG (ref 27–32)
MCHC RBC AUTO-ENTMCNC: 32.8 G/DL (ref 32–37)
MCV RBC AUTO: 83.2 FL (ref 80–100)
MONOCYTES # BLD: 1.2 K/UL (ref 0–1)
MONOCYTES NFR BLD: 8 %
NEUTROPHILS # BLD AUTO: 11.2 K/UL (ref 1.8–7.7)
NEUTROPHILS NFR BLD: 78 %
OSMOLALITY UR CALC.SUM OF ELEC: 281 MOSM/KG (ref 275–295)
PLATELET # BLD AUTO: 218 K/UL (ref 140–400)
PMV BLD AUTO: 10.9 FL (ref 7.4–10.3)
POTASSIUM SERPL-SCNC: 4 MMOL/L (ref 3.3–5.1)
RBC # BLD AUTO: 4.35 M/UL (ref 4.5–5.9)
SODIUM SERPL-SCNC: 135 MMOL/L (ref 136–144)
WBC # BLD AUTO: 14.3 K/UL (ref 4–11)

## 2018-01-01 PROCEDURE — 80048 BASIC METABOLIC PNL TOTAL CA: CPT | Performed by: HOSPITALIST

## 2018-01-01 PROCEDURE — 85025 COMPLETE CBC W/AUTO DIFF WBC: CPT | Performed by: HOSPITALIST

## 2018-01-01 PROCEDURE — 83735 ASSAY OF MAGNESIUM: CPT | Performed by: HOSPITALIST

## 2018-01-01 PROCEDURE — 82962 GLUCOSE BLOOD TEST: CPT

## 2018-01-01 PROCEDURE — 94640 AIRWAY INHALATION TREATMENT: CPT

## 2018-01-01 RX ORDER — SODIUM CHLORIDE 9 MG/ML
INJECTION, SOLUTION INTRAVENOUS CONTINUOUS
Status: DISCONTINUED | OUTPATIENT
Start: 2018-01-01 | End: 2018-01-03

## 2018-01-01 RX ORDER — MAGNESIUM OXIDE 400 MG (241.3 MG MAGNESIUM) TABLET
400 TABLET ONCE
Status: COMPLETED | OUTPATIENT
Start: 2018-01-01 | End: 2018-01-01

## 2018-01-01 RX ORDER — IPRATROPIUM BROMIDE AND ALBUTEROL SULFATE 2.5; .5 MG/3ML; MG/3ML
3 SOLUTION RESPIRATORY (INHALATION)
Status: DISCONTINUED | OUTPATIENT
Start: 2018-01-01 | End: 2018-01-08

## 2018-01-01 RX ORDER — IPRATROPIUM BROMIDE AND ALBUTEROL SULFATE 2.5; .5 MG/3ML; MG/3ML
3 SOLUTION RESPIRATORY (INHALATION) EVERY 4 HOURS PRN
Status: DISCONTINUED | OUTPATIENT
Start: 2018-01-01 | End: 2018-01-09

## 2018-01-01 RX ORDER — SODIUM CHLORIDE 9 MG/ML
INJECTION, SOLUTION INTRAVENOUS
Status: DISPENSED
Start: 2018-01-01 | End: 2018-01-02

## 2018-01-01 NOTE — PROGRESS NOTES
[facility]  Vascular Surgery Progress Note    Denise Stager Patient Status:  Inpatient    1955 MRN [id number]   Location [facility] 5SW/SE Attending Ivet Griffith MD   Hosp Day # 8 PCP None Pcp       Assessment and Plan: The subhash tissues all appear ischemic. He has a very pale color to the bottom of his foot. His fourth and fifth toes are dying. He has some cellulitis and ischemic ruborous change along the dorsal lateral aspect of the foot to the ankle.     Labs:    Lab Results (DEX-4) 4-0.006 g chewable tab 4 tablet 4 tablet Oral Q15 Min PRN   Heparin Sodium (Porcine) 5000 UNIT/ML injection 5,000 Units 5,000 Units Subcutaneous Q8H [facility]   ondansetron HCl (ZOFRAN) injection 4 mg 4 mg Intravenous Q6H PRN   Cefepime HCl (MAXIPIME) 1

## 2018-01-01 NOTE — PROGRESS NOTES
[facility] Hospitalist Progress Note     PCP: None Pcp    CC: Follow up       Assessment/Plan:     Mr. Gogo Lai is a 59 yo M with PMH of DM2- insulin dependent, HTN, stroke who presented with worsening L foot pain and swelling after injury to his foot a few days a meds     Stroke  - resume aspirin, started on plavix per vascular      FN:  - IVF: dc IVF  - Diet: DM2     DVT Prophy: SCD, HSQ  Lines: PIV     Dispo: pending clinical course        Further recommendations pending patient's clinical course.   [facility] • vancom[facility]  1,250 mg Intravenous Q12H   • aspirin  81 mg Oral Daily   • Insulin Aspart Pen  1-11 Units Subcutaneous TID CC   • Heparin Sodium (Porcine)  5,000 Units Subcutaneous Q8H [address]   • cefepime  1 g Intravenous Q8H       ipratropium-albuterol, Nor

## 2018-01-01 NOTE — PROGRESS NOTES
[facility] AND [facility] Infectious Disease  Progress Note    Mariah Sam Patient Status:  Inpatient    1955 MRN [id number]   Location [facility] 5SW/SE Attending Elias Eugene MD   Hosp Day # 8 PCP None Pcp     Subjective:  Patient seen vancomycin, cefepime, flagyl ongoing     2.  Pneumonia - symptomatically improving  - CXR with R sided opacification  - Possible post-op aspiration  - Antibiotics as above     3.  Leukocytosis due to the above  - At 14K today, will continue to trend     4.

## 2018-01-02 ENCOUNTER — ANESTHESIA (OUTPATIENT)
Dept: SURGERY | Facility: HOSPITAL | Age: 63
DRG: 616 | End: 2018-01-02
Payer: MEDICAID

## 2018-01-02 ENCOUNTER — ANESTHESIA EVENT (OUTPATIENT)
Dept: SURGERY | Facility: HOSPITAL | Age: 63
DRG: 616 | End: 2018-01-02
Payer: MEDICAID

## 2018-01-02 ENCOUNTER — SURGERY (OUTPATIENT)
Age: 63
End: 2018-01-02

## 2018-01-02 LAB
ANION GAP SERPL CALC-SCNC: 7 MMOL/L (ref 0–18)
ANION GAP SERPL CALC-SCNC: 8 MMOL/L (ref 0–18)
ANTIBODY SCREEN: NEGATIVE
BASOPHILS # BLD: 0.2 K/UL (ref 0–0.2)
BASOPHILS NFR BLD: 1 %
BUN SERPL-MCNC: 15 MG/DL (ref 8–20)
BUN SERPL-MCNC: 18 MG/DL (ref 8–20)
BUN/CREAT SERPL: 11.4 (ref 10–20)
BUN/CREAT SERPL: 13.4 (ref 10–20)
CALCIUM SERPL-MCNC: 8.3 MG/DL (ref 8.5–10.5)
CALCIUM SERPL-MCNC: 8.6 MG/DL (ref 8.5–10.5)
CHLORIDE SERPL-SCNC: 103 MMOL/L (ref 95–110)
CHLORIDE SERPL-SCNC: 98 MMOL/L (ref 95–110)
CO2 SERPL-SCNC: 26 MMOL/L (ref 22–32)
CO2 SERPL-SCNC: 28 MMOL/L (ref 22–32)
CREAT SERPL-MCNC: 1.32 MG/DL (ref 0.5–1.5)
CREAT SERPL-MCNC: 1.34 MG/DL (ref 0.5–1.5)
EOSINOPHIL # BLD: 0.2 K/UL (ref 0–0.7)
EOSINOPHIL NFR BLD: 2 %
ERYTHROCYTE [DISTWIDTH] IN BLOOD BY AUTOMATED COUNT: 14.1 % (ref 11–15)
GLUCOSE BLDC GLUCOMTR-MCNC: 174 MG/DL (ref 70–99)
GLUCOSE BLDC GLUCOMTR-MCNC: 214 MG/DL (ref 70–99)
GLUCOSE BLDC GLUCOMTR-MCNC: 280 MG/DL (ref 70–99)
GLUCOSE BLDC GLUCOMTR-MCNC: 296 MG/DL (ref 70–99)
GLUCOSE BLDC GLUCOMTR-MCNC: 327 MG/DL (ref 70–99)
GLUCOSE SERPL-MCNC: 249 MG/DL (ref 70–99)
GLUCOSE SERPL-MCNC: 342 MG/DL (ref 70–99)
HCT VFR BLD AUTO: 37 % (ref 41–52)
HGB BLD-MCNC: 12.3 G/DL (ref 13.5–17.5)
LYMPHOCYTES # BLD: 1.3 K/UL (ref 1–4)
LYMPHOCYTES NFR BLD: 11 %
MAGNESIUM SERPL-MCNC: 1.9 MG/DL (ref 1.8–2.5)
MCH RBC QN AUTO: 27.6 PG (ref 27–32)
MCHC RBC AUTO-ENTMCNC: 33.2 G/DL (ref 32–37)
MCV RBC AUTO: 83.3 FL (ref 80–100)
MONOCYTES # BLD: 1 K/UL (ref 0–1)
MONOCYTES NFR BLD: 9 %
NEUTROPHILS # BLD AUTO: 8.9 K/UL (ref 1.8–7.7)
NEUTROPHILS NFR BLD: 77 %
OSMOLALITY UR CALC.SUM OF ELEC: 292 MOSM/KG (ref 275–295)
OSMOLALITY UR CALC.SUM OF ELEC: 292 MOSM/KG (ref 275–295)
PLATELET # BLD AUTO: 198 K/UL (ref 140–400)
PMV BLD AUTO: 10.8 FL (ref 7.4–10.3)
POTASSIUM SERPL-SCNC: 4.9 MMOL/L (ref 3.3–5.1)
POTASSIUM SERPL-SCNC: 5.4 MMOL/L (ref 3.3–5.1)
RBC # BLD AUTO: 4.45 M/UL (ref 4.5–5.9)
RH BLOOD TYPE: POSITIVE
SODIUM SERPL-SCNC: 134 MMOL/L (ref 136–144)
SODIUM SERPL-SCNC: 136 MMOL/L (ref 136–144)
VANCOMYCIN TROUGH SERPL-MCNC: 17.6 MCG/ML (ref 10–20)
WBC # BLD AUTO: 11.5 K/UL (ref 4–11)

## 2018-01-02 PROCEDURE — 83735 ASSAY OF MAGNESIUM: CPT | Performed by: HOSPITALIST

## 2018-01-02 PROCEDURE — 80202 ASSAY OF VANCOMYCIN: CPT | Performed by: HOSPITALIST

## 2018-01-02 PROCEDURE — 86900 BLOOD TYPING SEROLOGIC ABO: CPT | Performed by: SURGERY

## 2018-01-02 PROCEDURE — 88311 DECALCIFY TISSUE: CPT | Performed by: SURGERY

## 2018-01-02 PROCEDURE — 80048 BASIC METABOLIC PNL TOTAL CA: CPT | Performed by: HOSPITALIST

## 2018-01-02 PROCEDURE — 82962 GLUCOSE BLOOD TEST: CPT

## 2018-01-02 PROCEDURE — 0Y6J0Z1 DETACHMENT AT LEFT LOWER LEG, HIGH, OPEN APPROACH: ICD-10-PCS | Performed by: SURGERY

## 2018-01-02 PROCEDURE — 85025 COMPLETE CBC W/AUTO DIFF WBC: CPT | Performed by: HOSPITALIST

## 2018-01-02 PROCEDURE — 86901 BLOOD TYPING SEROLOGIC RH(D): CPT | Performed by: SURGERY

## 2018-01-02 PROCEDURE — 88307 TISSUE EXAM BY PATHOLOGIST: CPT | Performed by: SURGERY

## 2018-01-02 PROCEDURE — 86850 RBC ANTIBODY SCREEN: CPT | Performed by: SURGERY

## 2018-01-02 RX ORDER — DEXTROSE AND SODIUM CHLORIDE 5; .45 G/100ML; G/100ML
INJECTION, SOLUTION INTRAVENOUS CONTINUOUS
Status: DISCONTINUED | OUTPATIENT
Start: 2018-01-02 | End: 2018-01-02

## 2018-01-02 RX ORDER — HYDROMORPHONE HYDROCHLORIDE 1 MG/ML
0.4 INJECTION, SOLUTION INTRAMUSCULAR; INTRAVENOUS; SUBCUTANEOUS EVERY 5 MIN PRN
Status: DISCONTINUED | OUTPATIENT
Start: 2018-01-02 | End: 2018-01-02 | Stop reason: HOSPADM

## 2018-01-02 RX ORDER — ACETAMINOPHEN 325 MG/1
650 TABLET ORAL EVERY 6 HOURS PRN
Status: DISCONTINUED | OUTPATIENT
Start: 2018-01-02 | End: 2018-01-09

## 2018-01-02 RX ORDER — ONDANSETRON 2 MG/ML
4 INJECTION INTRAMUSCULAR; INTRAVENOUS EVERY 6 HOURS PRN
Status: DISCONTINUED | OUTPATIENT
Start: 2018-01-02 | End: 2018-01-09

## 2018-01-02 RX ORDER — NALOXONE HYDROCHLORIDE 0.4 MG/ML
80 INJECTION, SOLUTION INTRAMUSCULAR; INTRAVENOUS; SUBCUTANEOUS AS NEEDED
Status: DISCONTINUED | OUTPATIENT
Start: 2018-01-02 | End: 2018-01-02 | Stop reason: HOSPADM

## 2018-01-02 RX ORDER — HYDROCODONE BITARTRATE AND ACETAMINOPHEN 5; 325 MG/1; MG/1
1 TABLET ORAL AS NEEDED
Status: DISCONTINUED | OUTPATIENT
Start: 2018-01-02 | End: 2018-01-02 | Stop reason: HOSPADM

## 2018-01-02 RX ORDER — MORPHINE SULFATE 2 MG/ML
2 INJECTION, SOLUTION INTRAMUSCULAR; INTRAVENOUS EVERY 10 MIN PRN
Status: DISCONTINUED | OUTPATIENT
Start: 2018-01-02 | End: 2018-01-02 | Stop reason: HOSPADM

## 2018-01-02 RX ORDER — MORPHINE SULFATE 4 MG/ML
4 INJECTION, SOLUTION INTRAMUSCULAR; INTRAVENOUS EVERY 10 MIN PRN
Status: DISCONTINUED | OUTPATIENT
Start: 2018-01-02 | End: 2018-01-02 | Stop reason: HOSPADM

## 2018-01-02 RX ORDER — PHENYLEPHRINE HCL 10 MG/ML
VIAL (ML) INJECTION AS NEEDED
Status: DISCONTINUED | OUTPATIENT
Start: 2018-01-02 | End: 2018-01-02 | Stop reason: SURG

## 2018-01-02 RX ORDER — MORPHINE SULFATE 10 MG/ML
6 INJECTION, SOLUTION INTRAMUSCULAR; INTRAVENOUS EVERY 10 MIN PRN
Status: DISCONTINUED | OUTPATIENT
Start: 2018-01-02 | End: 2018-01-02 | Stop reason: HOSPADM

## 2018-01-02 RX ORDER — HALOPERIDOL 5 MG/ML
0.25 INJECTION INTRAMUSCULAR ONCE AS NEEDED
Status: DISCONTINUED | OUTPATIENT
Start: 2018-01-02 | End: 2018-01-02 | Stop reason: HOSPADM

## 2018-01-02 RX ORDER — MORPHINE SULFATE 4 MG/ML
8 INJECTION, SOLUTION INTRAMUSCULAR; INTRAVENOUS EVERY 2 HOUR PRN
Status: DISCONTINUED | OUTPATIENT
Start: 2018-01-02 | End: 2018-01-09

## 2018-01-02 RX ORDER — HYDROCODONE BITARTRATE AND ACETAMINOPHEN 5; 325 MG/1; MG/1
2 TABLET ORAL EVERY 6 HOURS PRN
Status: DISCONTINUED | OUTPATIENT
Start: 2018-01-02 | End: 2018-01-09

## 2018-01-02 RX ORDER — HYDROMORPHONE HYDROCHLORIDE 1 MG/ML
0.2 INJECTION, SOLUTION INTRAMUSCULAR; INTRAVENOUS; SUBCUTANEOUS EVERY 5 MIN PRN
Status: DISCONTINUED | OUTPATIENT
Start: 2018-01-02 | End: 2018-01-02 | Stop reason: HOSPADM

## 2018-01-02 RX ORDER — HYDROCODONE BITARTRATE AND ACETAMINOPHEN 5; 325 MG/1; MG/1
1 TABLET ORAL EVERY 6 HOURS PRN
Status: DISCONTINUED | OUTPATIENT
Start: 2018-01-02 | End: 2018-01-09

## 2018-01-02 RX ORDER — DEXTROSE MONOHYDRATE 25 G/50ML
50 INJECTION, SOLUTION INTRAVENOUS AS NEEDED
Status: DISCONTINUED | OUTPATIENT
Start: 2018-01-02 | End: 2018-01-09

## 2018-01-02 RX ORDER — ONDANSETRON 2 MG/ML
4 INJECTION INTRAMUSCULAR; INTRAVENOUS ONCE AS NEEDED
Status: DISCONTINUED | OUTPATIENT
Start: 2018-01-02 | End: 2018-01-02 | Stop reason: HOSPADM

## 2018-01-02 RX ORDER — MORPHINE SULFATE 2 MG/ML
2 INJECTION, SOLUTION INTRAMUSCULAR; INTRAVENOUS EVERY 2 HOUR PRN
Status: DISCONTINUED | OUTPATIENT
Start: 2018-01-02 | End: 2018-01-09

## 2018-01-02 RX ORDER — ENOXAPARIN SODIUM 100 MG/ML
40 INJECTION SUBCUTANEOUS DAILY
Status: DISCONTINUED | OUTPATIENT
Start: 2018-01-03 | End: 2018-01-09

## 2018-01-02 RX ORDER — CLOPIDOGREL BISULFATE 75 MG/1
75 TABLET ORAL DAILY
Status: DISCONTINUED | OUTPATIENT
Start: 2018-01-03 | End: 2018-01-09

## 2018-01-02 RX ORDER — MORPHINE SULFATE 4 MG/ML
4 INJECTION, SOLUTION INTRAMUSCULAR; INTRAVENOUS EVERY 2 HOUR PRN
Status: DISCONTINUED | OUTPATIENT
Start: 2018-01-02 | End: 2018-01-09

## 2018-01-02 RX ORDER — HYDROCODONE BITARTRATE AND ACETAMINOPHEN 5; 325 MG/1; MG/1
2 TABLET ORAL AS NEEDED
Status: DISCONTINUED | OUTPATIENT
Start: 2018-01-02 | End: 2018-01-02 | Stop reason: HOSPADM

## 2018-01-02 RX ORDER — LIDOCAINE HYDROCHLORIDE 10 MG/ML
INJECTION, SOLUTION EPIDURAL; INFILTRATION; INTRACAUDAL; PERINEURAL AS NEEDED
Status: DISCONTINUED | OUTPATIENT
Start: 2018-01-02 | End: 2018-01-02 | Stop reason: SURG

## 2018-01-02 RX ORDER — HYDROMORPHONE HYDROCHLORIDE 1 MG/ML
0.6 INJECTION, SOLUTION INTRAMUSCULAR; INTRAVENOUS; SUBCUTANEOUS EVERY 5 MIN PRN
Status: DISCONTINUED | OUTPATIENT
Start: 2018-01-02 | End: 2018-01-02 | Stop reason: HOSPADM

## 2018-01-02 RX ORDER — SODIUM CHLORIDE, SODIUM LACTATE, POTASSIUM CHLORIDE, CALCIUM CHLORIDE 600; 310; 30; 20 MG/100ML; MG/100ML; MG/100ML; MG/100ML
INJECTION, SOLUTION INTRAVENOUS CONTINUOUS
Status: DISCONTINUED | OUTPATIENT
Start: 2018-01-02 | End: 2018-01-02 | Stop reason: HOSPADM

## 2018-01-02 RX ORDER — BUPIVACAINE HYDROCHLORIDE 2.5 MG/ML
INJECTION, SOLUTION EPIDURAL; INFILTRATION; INTRACAUDAL AS NEEDED
Status: DISCONTINUED | OUTPATIENT
Start: 2018-01-02 | End: 2018-01-02 | Stop reason: HOSPADM

## 2018-01-02 RX ADMIN — SODIUM CHLORIDE: 9 INJECTION, SOLUTION INTRAVENOUS at 15:29:00

## 2018-01-02 RX ADMIN — PHENYLEPHRINE HCL 100 MCG: 10 MG/ML VIAL (ML) INJECTION at 14:37:00

## 2018-01-02 RX ADMIN — PHENYLEPHRINE HCL 100 MCG: 10 MG/ML VIAL (ML) INJECTION at 14:22:00

## 2018-01-02 RX ADMIN — PHENYLEPHRINE HCL 100 MCG: 10 MG/ML VIAL (ML) INJECTION at 14:10:00

## 2018-01-02 RX ADMIN — ONDANSETRON 4 MG: 2 INJECTION INTRAMUSCULAR; INTRAVENOUS at 14:45:00

## 2018-01-02 RX ADMIN — LIDOCAINE HYDROCHLORIDE 50 MG: 10 INJECTION, SOLUTION EPIDURAL; INFILTRATION; INTRACAUDAL; PERINEURAL at 13:38:00

## 2018-01-02 RX ADMIN — SODIUM CHLORIDE: 9 INJECTION, SOLUTION INTRAVENOUS at 13:33:00

## 2018-01-02 NOTE — PROGRESS NOTES
Antibiotic Stewardship Note: Duration of Therapy  ID on case : Naomie Ao, DO  Date:  1/2/2018    Drug/Duration:   Was on Keflex PTA  Cefepime started on 12/24 now D#10  Flagyl po added 12/27  Vancomycin: Started on 12./24, now D#10,  Next level.

## 2018-01-02 NOTE — PROGRESS NOTES
[facility] AND [facility] Infectious Disease  Progress Note    Keely Meraz Patient Status:  Inpatient    1955 MRN [id number]   Location [facility] Attending Josr Heath MD   Hosp Day # 9 PCP None Pcp     Subjective:  Kailee Joyce symptomatically improving  - CXR with R sided opacification  - Possible post-op aspiration  - Antibiotics as above     3.  Leukocytosis due to the above  - At 11K today, will continue to trend     4.  DM II  - Needs tight glycemic control for optimal treat

## 2018-01-02 NOTE — PAYOR COMM NOTE
12/28      Filed: 12/28/2017  3:16 PM Date of Service: 12/28/2017 11:59 AM Status: Signed   : Pam Fulton MD (Physician)   []Manual[x]Template  []Copied     [facility] Hospitalist Progress Note           Assessment/Plan:      Mr. Emily Bocanegra is a 57 yo - resume aspirin, started on plavix per vascular       FN:  - IVF: dc IVF  - Diet: DM2           Subjective        Objective      OBJECTIVE:  Temp:  [97.4 °F (36.3 °C)-98.6 °F (37 °C)] 98.6 °F (37 °C)  Pulse:  [88-98] 88  Resp:  [18-20] 20  BP: (113-147)/( WBC  29.4*  20.2*  15.9*   HGB  12.6*  11.8*  12.2*   MCV  83.4  83.2  82.6   PLT  200  183  192               Recent Labs   Lab  12/26/17   0528  12/27/17   0550  12/28/17   0305   NA  134*  136  135*   K  4.5  4.0  3.5   CL  104  105  103   CO2  22  25 • Insulin Aspart Pen  15 Units Subcutaneous TID CC   • collagenase   Topical Daily   • ipratropium-albuterol  3 mL Nebulization Q4H WA (5 times daily)   • metRONIDAZOLE  250 mg Oral Q8H   • Clopidogrel Bisulfate  75 mg Oral Daily   • vancomycin  1,250 mg I Pulse:  [] 100  Resp:  [18] 18  BP: (126-136)/(83-87) 136/84     Intake/Output:     Intake/Output Summary (Last 24 hours) at 12/30/17 1654  Last data filed at 12/30/17 1454    Gross per 24 hour   Intake              780 ml   Output                0 m 0653   NA  135*   --   137  134*   K  3.5  4.3  4.1  3.6   CL  103   --   105  98   CO2  24   --   26  29   BUN  11   --   9  11   CREATSERUM  1.09   --   1.12  1.12   CA  7.9*   --   8.1*  8.1*   MG  1.8   --   1.7*  1.7*   GLU  261*   --   256*  157* • aspirin  81 mg Oral Daily   • Insulin Aspart Pen  1-11 Units Subcutaneous TID CC   • Heparin Sodium (Porcine)  5,000 Units Subcutaneous Q8H [address]   • cefepime  1 g Intravenous Q8H         Normal Saline Flush, dextrose 50%, Glucose-Vitamin C, Normal Saline Blood pressure 132/83, pulse 92, temperature (!) 97.3 °F (36.3 °C), temperature source Oral, resp.  rate 16, height 6' 0.01\" (1.829 m), weight 221 lb 11.2 oz (100.6 kg), SpO2 93 %.     Intake/Output:     Intake/Output Summary (Last 24 hours) at 01/01/18 09 5.  Disposition - inpatient.  Continue IV antibiotics as above.  Concern wound will not heal and he may require a more definitive procedure such as a BKA.  Final choice, route, and duration of antibiotics to be determined pending clinical course and vascul

## 2018-01-02 NOTE — ANESTHESIA POSTPROCEDURE EVALUATION
Patient: Jesenia Florin    Procedure Summary     Date:  01/02/18 Room / Location:  [address] [place] [address] MAIN OR 06 / [address] [place] [address] MAIN OR    Anesthesia Start:  5473 Anesthesia Stop:      Procedure:  KNEE AMPUTATION ABOVE/BELOW (Left Lower Leg) Diagnosis:       Cellulitis in diabeti

## 2018-01-02 NOTE — PLAN OF CARE
Problem: Diabetes/Glucose Control  Goal: Glucose maintained within prescribed range  INTERVENTIONS:  - Monitor Blood Glucose as ordered  - Assess for signs and symptoms of hyperglycemia and hypoglycemia  - Administer ordered medications to maintain glucose evaluate response  - Consider cultural and social influences on pain and pain management  - Manage/alleviate anxiety  - Utilize distraction and/or relaxation techniques  - Monitor for opioid side effects  - Notify MD/LIP if interventions unsuccessful or pa

## 2018-01-02 NOTE — PLAN OF CARE
Problem: Diabetes/Glucose Control  Goal: Glucose maintained within prescribed range  INTERVENTIONS:  - Monitor Blood Glucose as ordered  - Assess for signs and symptoms of hyperglycemia and hypoglycemia  - Administer ordered medications to maintain glucose nonhealing; Going for L BKA today    Problem: PAIN - ADULT  Goal: Verbalizes/displays adequate comfort level or patient's stated pain goal  INTERVENTIONS:  - Encourage pt to monitor pain and request assistance  - Assess pain using appropriate pain scale  -

## 2018-01-02 NOTE — OPERATIVE REPORT
[facility]    PATIENT'S NAME: [place] Breed   ATTENDING PHYSICIAN: Adriana Glasgow MD   OPERATING PHYSICIAN: Nakul Uribe.  Miranda Rodrigues MD   PATIENT ACCOUNT#:   [id number]    LOCATION:  [facility] [address] PACU [address] [place] [address]  MEDICAL RECORD #:   [id number]       DATE Vicryl suture ties or 3-0 Vicryl sutures. After completion of the hemostasis, the periosteum was anesthetized with the 0.25% Marcaine solution. The skin had been anesthetized with the same solution.   The sural nerve and the tibial nerve were identified a

## 2018-01-02 NOTE — ANESTHESIA PREPROCEDURE EVALUATION
Anesthesia PreOp Note    HPI:     Syed Carcamo is a 58year old male who presents for preoperative consultation requested by: Zoe Marshall MD    Date of Surgery: 12/24/2017 - 1/2/2018    Procedure(s):  KNEE AMPUTATION ABOVE/BELOW  Indication: Cellu g chewable tab 4 tablet 4 tablet Oral Q15 Min PRN Rebekah Carmona MD     [facility] Hold] Insulin Regular Human (NOVOLIN R) 100 UNIT/ML injection 1-11 Units 1-11 Units Subcutaneous 4 times per day Rebekah Carmona MD 7 Units at 01/02/18 1200    [MAR Hold] ipr [MAR Hold] Heparin Sodium (Porcine) 5000 UNIT/ML injection 5,000 Units 5,000 Units Subcutaneous [facility] Wilber Bond MD Stopped at 01/01/18 2200    [MAR Hold] ondansetron HCl (ZOFRAN) injection 4 mg 4 mg Intravenous Q6H PRN Wilber Bond MD 01/01/18  0741 01/01/18  1729 01/01/18  2144 01/02/18  0937   BP:  136/81 128/74 137/87   BP Location:  Right arm Right arm Right arm   Pulse: 92 95 94 86   Resp: 16 18 16 16   Temp:  97.8 °F (36.6 °C) 97.8 °F (36.6 °C) 98.2 °F (36.8 °C)   TempSrc:  Oral O

## 2018-01-02 NOTE — PROGRESS NOTES
[facility] Hospitalist Progress Note     PCP: None Pcp    CC: Follow up       Assessment/Plan:     Mr. Autumn Ramsey is a 57 yo M with PMH of DM2- insulin dependent, HTN, stroke who presented with worsening L foot pain and swelling after injury to his foot a few days p hypoglycemic protocol, SSI  - wife thinks last HgA1c 11, repeat A1c 10.0  - improved, continue insulin at 80 units  And SSI post op.   Preop levemir decreased to 60 units and AM BS was in the 300's     HTN  - BP normal  - not on home meds     Stroke  - resu enoxaparin  40 mg Subcutaneous Daily   • [START ON 1/3/2018] Clopidogrel Bisulfate  75 mg Oral Daily   • Insulin Aspart Pen  1-11 Units Subcutaneous TID CC   • insulin detemir  80 Units Subcutaneous Nightly   • ipratropium-albuterol  3 mL Nebulization 2 ti

## 2018-01-02 NOTE — BRIEF OP NOTE
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

## 2018-01-02 NOTE — PLAN OF CARE
Problem: Diabetes/Glucose Control  Goal: Glucose maintained within prescribed range  INTERVENTIONS:  - Monitor Blood Glucose as ordered  - Assess for signs and symptoms of hyperglycemia and hypoglycemia  - Administer ordered medications to maintain glucose areas of redness and/or skin breakdown  - Initiate interventions, skin care algorithm/standards of care as needed   Outcome: Progressing  (1) Skin kept clean, dry, and intact. (2) Wound care dressing changes are done as ordered by the doctor.   (3) Assess schedule   Outcome: Progressing  (1) Fall risk precautions are followed for safety and comfort. (2) Non-skid socks are worn for patient safety. (3) Call light within reach and patient instructed to use call light for assistance.   (4) Hourly rounds are do

## 2018-01-03 LAB
ANION GAP SERPL CALC-SCNC: 8 MMOL/L (ref 0–18)
BUN SERPL-MCNC: 14 MG/DL (ref 8–20)
BUN/CREAT SERPL: 13.2 (ref 10–20)
CALCIUM SERPL-MCNC: 8.3 MG/DL (ref 8.5–10.5)
CHLORIDE SERPL-SCNC: 102 MMOL/L (ref 95–110)
CO2 SERPL-SCNC: 25 MMOL/L (ref 22–32)
CREAT SERPL-MCNC: 1.06 MG/DL (ref 0.5–1.5)
ERYTHROCYTE [DISTWIDTH] IN BLOOD BY AUTOMATED COUNT: 14.1 % (ref 11–15)
GLUCOSE BLDC GLUCOMTR-MCNC: 139 MG/DL (ref 70–99)
GLUCOSE BLDC GLUCOMTR-MCNC: 194 MG/DL (ref 70–99)
GLUCOSE BLDC GLUCOMTR-MCNC: 239 MG/DL (ref 70–99)
GLUCOSE BLDC GLUCOMTR-MCNC: 293 MG/DL (ref 70–99)
GLUCOSE BLDC GLUCOMTR-MCNC: 326 MG/DL (ref 70–99)
GLUCOSE SERPL-MCNC: 153 MG/DL (ref 70–99)
HCT VFR BLD AUTO: 34.2 % (ref 41–52)
HGB BLD-MCNC: 11.2 G/DL (ref 13.5–17.5)
MCH RBC QN AUTO: 27.3 PG (ref 27–32)
MCHC RBC AUTO-ENTMCNC: 32.7 G/DL (ref 32–37)
MCV RBC AUTO: 83.5 FL (ref 80–100)
OSMOLALITY UR CALC.SUM OF ELEC: 284 MOSM/KG (ref 275–295)
PLATELET # BLD AUTO: 234 K/UL (ref 140–400)
PMV BLD AUTO: 10.8 FL (ref 7.4–10.3)
POTASSIUM SERPL-SCNC: 3.9 MMOL/L (ref 3.3–5.1)
RBC # BLD AUTO: 4.1 M/UL (ref 4.5–5.9)
SODIUM SERPL-SCNC: 135 MMOL/L (ref 136–144)
WBC # BLD AUTO: 16 K/UL (ref 4–11)

## 2018-01-03 PROCEDURE — 85027 COMPLETE CBC AUTOMATED: CPT | Performed by: SURGERY

## 2018-01-03 PROCEDURE — 82962 GLUCOSE BLOOD TEST: CPT

## 2018-01-03 PROCEDURE — 80048 BASIC METABOLIC PNL TOTAL CA: CPT | Performed by: SURGERY

## 2018-01-03 PROCEDURE — 94640 AIRWAY INHALATION TREATMENT: CPT

## 2018-01-03 NOTE — PROGRESS NOTES
[facility] Hospitalist Progress Note     PCP: None Pcp    CC: Follow up       Assessment/Plan:     Mr. Gogo Lai is a 59 yo M with PMH of DM2- insulin dependent, HTN, stroke who presented with worsening L foot pain and swelling after injury to his foot a few days p basaglar 70 units daily, victoza 1.2 mg daily, metformin 500 mg daily - hold  - accuchecks QID, hypoglycemic protocol, SSI  - wife thinks last HgA1c 11, repeat A1c 10.0  - improved, continue insulin at 80 units  And SSI post op.   Preop levemir decreased to • enoxaparin  40 mg Subcutaneous Daily   • Clopidogrel Bisulfate  75 mg Oral Daily   • Insulin Aspart Pen  1-11 Units Subcutaneous TID CC   • insulin detemir  80 Units Subcutaneous Nightly   • vancomycin  1,000 mg Intravenous Q12H   • ipratropium-albut

## 2018-01-03 NOTE — PROGRESS NOTES
[facility] dosing service    Follow-up Pharmacokinetic Consult for Vancomycin Dosing     Steve Castro is a 58year old male who is being treated for cellulitis. Patient is on day 10 of Vancomycin 1.25 gm IV Q 12 hours. Goal trough is 10-15 ug/mL. Positive Cocci N/A   *Culture results found, see result in Chart Review     3.  ANAEROBIC CULTURE Status: Abnormal   Collection Time: 12/25/17 4:39 PM   Result Value Ref Range   Anaerobic Culture 4+ growth Finegoldia magna (A) N/A   4. AEROBIC BACTERIAL CUL

## 2018-01-03 NOTE — CM/SW NOTE
Spoke with Donavan from [facility] regarding approval of admission. Requested information given. Admission for 12/24-27 approved. Authorization number [id number]. Contact number for Donavan [phone].

## 2018-01-03 NOTE — PROGRESS NOTES
[facility] AND [facility] Infectious Disease Progress Note    Sudeep Macario Patient Status:  Inpatient    1955 MRN [id number]   Location [facility] 5SW/SE Attending Benitez Mccoy MD   Hosp Day # 10 PCP None Pcp     Subjective:  Pt complains collagenase (SANTYL) 250 UNIT/GM ointment, , Topical, Daily  •  metRONIDAZOLE (FLAGYL) 50mg/ml suspension 250 mg, 250 mg, Oral, Q8H  •  aspirin chewable tab 81 mg, 81 mg, Oral, Daily  •  ondansetron HCl (ZOFRAN) injection 4 mg, 4 mg, Intravenous, Q4H PRN 01/03/2018   CO2 25 01/03/2018    01/03/2018   CA 8.3 01/03/2018       Assessment/Plan:    1.   Necrotic 5th toe  -secondary to trauma  -MRI negative for OM  -was on PO keflex PTA  -s/p partial L 5th ray amputation on 12/25  -OR cultures with MSSA  -

## 2018-01-03 NOTE — PHYSICAL THERAPY NOTE
Pt has been seen for PT s/p Lt toe amputation but now pt is S/P Lt BKA on 1/2/2018. Pt is currently on bed rest. Will need new PT orders once medically cleared. Digna Quiñones was informed.  RN will f/u with MD. Will see patient for skilled PT eval once new PT o

## 2018-01-03 NOTE — PLAN OF CARE
Problem: Diabetes/Glucose Control  Goal: Glucose maintained within prescribed range  INTERVENTIONS:  - Monitor Blood Glucose as ordered  - Assess for signs and symptoms of hyperglycemia and hypoglycemia  - Administer ordered medications to maintain glucose Reinforced    Problem: PAIN - ADULT  Goal: Verbalizes/displays adequate comfort level or patient's stated pain goal  INTERVENTIONS:  - Encourage pt to monitor pain and request assistance  - Assess pain using appropriate pain scale  - Administer analgesics

## 2018-01-03 NOTE — PROGRESS NOTES
Antibiotic Stewardship Note:  Duration of Therapy  Date:  1/3/18  Drug/Duration:  Flagyl 250 mg po q8h   Cefepime IV started on 12/24 now D#11  Vancomycin: Started on 12/24, now D#11,     Indication: Cellulitis/pneumonia.    S/p left BKA on 1/2/18    Cultur

## 2018-01-03 NOTE — PROGRESS NOTES
The patient's pain is well controlled. His dressing is intact. He is practicing straightening his knee. He may be involved in physical therapy without restrictions other than pain.

## 2018-01-04 ENCOUNTER — APPOINTMENT (OUTPATIENT)
Dept: CT IMAGING | Facility: HOSPITAL | Age: 63
DRG: 616 | End: 2018-01-04
Attending: HOSPITALIST
Payer: MEDICAID

## 2018-01-04 LAB
ANION GAP SERPL CALC-SCNC: 8 MMOL/L (ref 0–18)
BUN SERPL-MCNC: 16 MG/DL (ref 8–20)
BUN/CREAT SERPL: 12.8 (ref 10–20)
CALCIUM SERPL-MCNC: 8.4 MG/DL (ref 8.5–10.5)
CHLORIDE SERPL-SCNC: 95 MMOL/L (ref 95–110)
CO2 SERPL-SCNC: 27 MMOL/L (ref 22–32)
CREAT SERPL-MCNC: 1.25 MG/DL (ref 0.5–1.5)
GLUCOSE BLDC GLUCOMTR-MCNC: 134 MG/DL (ref 70–99)
GLUCOSE BLDC GLUCOMTR-MCNC: 224 MG/DL (ref 70–99)
GLUCOSE BLDC GLUCOMTR-MCNC: 286 MG/DL (ref 70–99)
GLUCOSE BLDC GLUCOMTR-MCNC: 372 MG/DL (ref 70–99)
GLUCOSE SERPL-MCNC: 344 MG/DL (ref 70–99)
OSMOLALITY UR CALC.SUM OF ELEC: 285 MOSM/KG (ref 275–295)
POTASSIUM SERPL-SCNC: 4.9 MMOL/L (ref 3.3–5.1)
SODIUM SERPL-SCNC: 130 MMOL/L (ref 136–144)

## 2018-01-04 PROCEDURE — 82962 GLUCOSE BLOOD TEST: CPT

## 2018-01-04 PROCEDURE — 72193 CT PELVIS W/DYE: CPT | Performed by: HOSPITALIST

## 2018-01-04 PROCEDURE — 80048 BASIC METABOLIC PNL TOTAL CA: CPT | Performed by: HOSPITALIST

## 2018-01-04 PROCEDURE — 99211 OFF/OP EST MAY X REQ PHY/QHP: CPT

## 2018-01-04 PROCEDURE — 97110 THERAPEUTIC EXERCISES: CPT

## 2018-01-04 PROCEDURE — 97164 PT RE-EVAL EST PLAN CARE: CPT

## 2018-01-04 PROCEDURE — 94640 AIRWAY INHALATION TREATMENT: CPT

## 2018-01-04 NOTE — CONSULTS
[facility]    PATIENT'S NAME: Janneth Venturas   ATTENDING PHYSICIAN: Shwetha Box MD   CONSULTING PHYSICIAN: Kashif Marcial DO   PATIENT ACCOUNT#:   [de-identified]    LOCATION:  [address] [place] [address] #:   [id number]       DATE OF BIRTH:  [birth year]. ALLERGIES:  No known drug allergies. SOCIAL HISTORY:  The patient lives with his family in a house with 6 steps to enter. No tobacco, alcohol, or illicit drug use reported. FAMILY HISTORY:  Significant for mother having dementia. bilateral upper extremities. Negative Ramsay's bilaterally. Speech is clear and fluent. LABORATORY DATA:  BMP from yesterday sodium 135, potassium 3.9, BUN 14, creatinine 1.06. WBC 16, hemoglobin 11.2, hematocrit 34.2, platelet count 470,073.     ASS

## 2018-01-04 NOTE — CM/SW NOTE
Sw met w/ pt and pt's wife to discuss PMR rec of acute rehab. Pt/wife agreeable and requesting referral to [facility]. SW placed referral to . Will need insurance auth prior to d/c.  Wife stated if  is unable to accept, their second preference would be L

## 2018-01-04 NOTE — PROGRESS NOTES
[facility] AND [facility] Infectious Disease Progress Note    Timoteo Peter Patient Status:  Inpatient    1955 MRN [id number]   Location [facility] 5SW/SE Attending Diana Zafar MD   Hosp Day # 11 PCP None Pcp     Subjective:  Pt complains solution 3 mL, 3 mL, Nebulization, Q4H PRN  •  collagenase (SANTYL) 250 UNIT/GM ointment, , Topical, Daily  •  metRONIDAZOLE (FLAGYL) 50mg/ml suspension 250 mg, 250 mg, Oral, Q8H  •  aspirin chewable tab 81 mg, 81 mg, Oral, Daily  •  ondansetron HCl (ZOFRA MSSA  -s/p angio of LLE on 12/29  -s/p BKA on 1/2  -vascular and podiatry on consult  -on IV vancomycin, cefepime and PO flagyl  2. PNA  -CXR with R opacification  -possibly post-op aspiration  -on abx above  3.   Leukocytosis  -16k, likely reactionary to

## 2018-01-04 NOTE — PHYSICAL THERAPY NOTE
PHYSICAL THERAPY EVALUATION - INPATIENT     Room Number: 554/554-A  Evaluation Date: 1/4/2018  Type of Evaluation: Initial PT eval on 12/28/2017 and now pt is being seen for Reevaluation on 1/4/18 S/P Lt MURPHYA by Jeyson Cleary  Physician Order: PT Eval and encouraged pt to get up in the chair 3 times/day with nursing assist 2-3 times/day to improve activity tolerance. Pt understands well.       Pt demonstrated decreased generalized strength, decreased endurance/activity tolerance, decrease LLE srength, impair One level  Stairs to Enter : 6  Railing: Yes          Lives With: Spouse  Drives: Yes  Patient Owned Equipment:  (RW, cane, crutches)  Patient Regularly Uses:  (independent with amb without AD)    Prior Level of [place]: Indep    SUBJECTIVE  I feel a wheelchair)?: A Little   -   Need to walk in hospital room?: A Little   -   Climbing 3-5 steps with a railing?: A Lot     AM-PAC Score:  Raw Score: 17   PT Approx Degree of Impairment Score: 50.57%   Standardized Score (AM-PAC Scale): 42.13   CMS Modifier

## 2018-01-04 NOTE — PLAN OF CARE
Problem: Diabetes/Glucose Control  Goal: Glucose maintained within prescribed range  INTERVENTIONS:  - Monitor Blood Glucose as ordered  - Assess for signs and symptoms of hyperglycemia and hypoglycemia  - Administer ordered medications to maintain glucose ADULT - FALL  Goal: Free from fall injury  INTERVENTIONS:  - Assess pt frequently for physical needs  - Identify cognitive and physical deficits and behaviors that affect risk of falls.   - Innis fall precautions as indicated by assessment.  - Educate p

## 2018-01-04 NOTE — PLAN OF CARE
Patient's wife reported that she noticed at hard bump to patient's left buttock this afternoon. Patient reported that bump was not present to left buttock on this morning because she has been massaging his buttocks areas.  Notified Dr. Estefani Vargas regarding the new

## 2018-01-04 NOTE — WOUND PROGRESS NOTE
Wound Care Services  Follow up on the pt. he is resting, spouse is at the bedside, he is s/p left BKA 1/2/18 by Dr Anand Bazzi, dressing intact, dressings per Dr Anand Bazzi, spoke with the pt's nurse.

## 2018-01-04 NOTE — CONSULTS
PM&R Consult Dictated. Recommendations: Acute Inpatient Rehab. ELOS 10-14 days. Prognosis fair. Goal is home with family at a supervision level. Will continue to follow progress.     Thank you for this consultation,  Doc DO Anahi  [address] [place] [address]

## 2018-01-05 LAB
GLUCOSE BLDC GLUCOMTR-MCNC: 171 MG/DL (ref 70–99)
GLUCOSE BLDC GLUCOMTR-MCNC: 261 MG/DL (ref 70–99)
GLUCOSE BLDC GLUCOMTR-MCNC: 407 MG/DL (ref 70–99)
GLUCOSE BLDC GLUCOMTR-MCNC: 95 MG/DL (ref 70–99)

## 2018-01-05 PROCEDURE — 97530 THERAPEUTIC ACTIVITIES: CPT

## 2018-01-05 PROCEDURE — 82962 GLUCOSE BLOOD TEST: CPT

## 2018-01-05 PROCEDURE — 97116 GAIT TRAINING THERAPY: CPT

## 2018-01-05 PROCEDURE — 94640 AIRWAY INHALATION TREATMENT: CPT

## 2018-01-05 PROCEDURE — 97165 OT EVAL LOW COMPLEX 30 MIN: CPT

## 2018-01-05 PROCEDURE — 97110 THERAPEUTIC EXERCISES: CPT

## 2018-01-05 PROCEDURE — 97168 OT RE-EVAL EST PLAN CARE: CPT

## 2018-01-05 RX ORDER — DOCUSATE SODIUM 100 MG/1
100 CAPSULE, LIQUID FILLED ORAL 2 TIMES DAILY
Status: DISCONTINUED | OUTPATIENT
Start: 2018-01-05 | End: 2018-01-09

## 2018-01-05 RX ORDER — POLYETHYLENE GLYCOL 3350 17 G/17G
17 POWDER, FOR SOLUTION ORAL DAILY PRN
Status: DISCONTINUED | OUTPATIENT
Start: 2018-01-05 | End: 2018-01-09

## 2018-01-05 NOTE — OCCUPATIONAL THERAPY NOTE
OCCUPATIONAL THERAPY EVALUATION - INPATIENT      Room Number: 554/554-A  Evaluation Date: 1/5/2018  Type of Evaluation: Re-evaluation  Presenting Problem: L elbaa 1/2/18    Physician Order: IP Consult to Occupational Therapy  Reason for Therapy: ADL/IADL Dys [facility])    Past Medical History  Past Medical History:   Diagnosis Date   • Diabetes [facility])    • Essential hypertension    • Stroke [facility])      Past Surgical History  History reviewed. No pertinent surgical history.     HOME SITUATION  Type of Home: [facility] Sit : Supervision  Sit to Stand: Minimum assistance  Chair Transfer: min a    Bedroom Mobility: supervision    BALANCE ASSESSMENT  Static Sitting: supervision  Dynamic Sitting: supervision  Static Standing: cga w/ rw  Dynamic Standing: cga w/ rw    FUNCTIO

## 2018-01-05 NOTE — PROGRESS NOTES
I changed the patient's below-knee amputation stump dressing. His wound looks perfect and he has no signs of significant bruising nor any ischemia. The patient may consider going to rehabilitation whenever he is most comfortable.   This would likely be on

## 2018-01-05 NOTE — PROGRESS NOTES
Keely Meraz is a 58year old male. Patient presents with:  Cellulitis (integumentary, infectious)      HPI:    No new complaints  Some buttock pain  REVIEW OF SYSTEMS:   A comprehensive 11 point review of systems was completed.   Pertinent positives an optimal healing  5.   Dispo  -continue IV abx, likely transition to short course of PO on dc  -buttock hematoma observe for now ;rx heat suggested               Lab Results  Component Value Date   CREATSERUM 1.25 01/04/2018   BUN 16 01/04/2018    01/0 -MAGNESIUM   Result Value Ref Range   Magnesium 1.8 1.8 - 2.5 mg/dL   -BASIC METABOLIC PANEL (8)   Result Value Ref Range   Glucose 292 (H) 70 - 99 mg/dL   Sodium 134 (L) 136 - 144 mmol/L   Potassium 4.5 3.3 - 5.1 mmol/L   Chloride 104 95 - 110 mmol/L Value Ref Range   Beta Natriuretic Peptide 446 (H) 0 - 100 pg/mL   -CBC, PLATELET; NO DIFFERENTIAL   Result Value Ref Range   WBC 15.9 (H) 4.0 - 11.0 K/UL   RBC 4.46 (L) 4.50 - 5.90 M/UL   HGB 12.2 (L) 13.5 - 17.5 g/dL   HCT 36.8 (L) 41.0 - 52.0 %   MCV 82 CO2 29 22 - 32 mmol/L   BUN 11 8 - 20 mg/dL   Creatinine 1.12 0.50 - 1.50 mg/dL   Calcium, Total 8.1 (L) 8.5 - 10.5 mg/dL   BUN/CREA Ratio 9.8 (L) 10.0 - 20.0   Anion Gap 7 0 - 18 mmol/L   Calculated Osmolality 281 275 - 295 mOsm/kg   GFR, Non-African Am -American >60 >=60   -VANCOMYCIN TROUGH, SERUM   Result Value Ref Range   Vancomycin Trough 17.6 10.0 - 20.0 mcg/mL   -BASIC METABOLIC PANEL (8)   Result Value Ref Range   Glucose 249 (H) 70 - 99 mg/dL   Sodium 136 136 - 144 mmol/L   Potassium 4.9 3 Result Value Ref Range   ABO BLOOD TYPE A    RH BLOOD TYPE Positive    -ANTIBODY SCREEN   Result Value Ref Range   Antibody Screen Negative    -SURGICAL PATHOLOGY TISSUE   Result Value Ref Range   Case Report Surgical Pathology toe amputation with a smooth   bone resection margin. The soft tissue and skin margins are red-purple   hemorrhagic and possibly necrotic. The distal end of the toe contains a   roughened tan nail.  The entire skin surface is red-purple and dusky with   an acute inflammation. · Blood vessels demonstrating calcific atherosclerosis and partial   occlusion of the lumen. · Bone and soft tissue surgical margins are viable.  [facility] REMOVED]    Embedded Images     Clinical Information E13.628, L03.119 Celluli 70 - 99   -POCT GLUCOSE   Result Value Ref Range   POC Glucose  225 (H) 70 - 99   -POCT GLUCOSE   Result Value Ref Range   POC Glucose  266 (H) 70 - 99   -POCT GLUCOSE   Result Value Ref Range   POC Glucose  254 (H) 70 - 99   -POCT GLUCOSE   Result Value R -POCT GLUCOSE   Result Value Ref Range   POC Glucose  155 (H) 70 - 99   -POCT GLUCOSE   Result Value Ref Range   POC Glucose  111 (H) 70 - 99   -POCT GLUCOSE   Result Value Ref Range   POC Glucose  260 (H) 70 - 99   -POCT GLUCOSE   Result Value Ref Range Hold Gold Auto Resulted    -RAINBOW DRAW LAVENDER TALL (BNP)   Result Value Ref Range   Hold Lavender Auto Resulted    -BLOOD CULTURE   Result Value Ref Range   Blood Culture Result No Growth 5 Days    -BLOOD CULTURE   Result Value Ref Range   Blood Cult - 37.0 g/dl   RDW 13.2 11.0 - 15.0 %    140 - 400 K/UL   MPV 12.2 (H) 7.4 - 10.3 fL   Neutrophil % 81 %   Lymphocyte % 9 %   Monocyte % 9 %   Eosinophil % 1 %   Basophil % 0 %   Neutrophil Absolute 11.9 (H) 1.8 - 7.7 K/UL   Lymphocyte Absolute 1.4 1 140 - 400 K/UL   MPV 11.0 (H) 7.4 - 10.3 fL   Neutrophil % 75 %   Lymphocyte % 14 %   Monocyte % 9 %   Eosinophil % 2 %   Basophil % 1 %   Neutrophil Absolute 10.1 (H) 1.8 - 7.7 K/UL   Lymphocyte Absolute 1.8 1.0 - 4.0 K/UL   Monocyte Absolute 1.2 (H) 0.0

## 2018-01-05 NOTE — PLAN OF CARE
Problem: Diabetes/Glucose Control  Goal: Glucose maintained within prescribed range  INTERVENTIONS:  - Monitor Blood Glucose as ordered  - Assess for signs and symptoms of hyperglycemia and hypoglycemia  - Administer ordered medications to maintain glucose injury  INTERVENTIONS:  - Assess pt frequently for physical needs  - Identify cognitive and physical deficits and behaviors that affect risk of falls.   - [place] fall precautions as indicated by assessment.  - Educate pt/family on patient safety includin

## 2018-01-05 NOTE — PROGRESS NOTES
[facility] Hospitalist Progress Note     PCP: None Pcp    CC: Follow up       Assessment/Plan:     Mr. Yanique Villarreal is a 57 yo M with PMH of DM2- insulin dependent, HTN, stroke who presented with worsening L foot pain and swelling after injury to his foot a few days p started on plavix per vascular      Left buttock mass  - started acutely on 1/4  - ?hematoma  - check CT pelvis for further defining this    FN:  - IVF: stop IVF  - Diet: DM2     DVT Prophy: SCD,  Lovenox  Lines: PIV     Dispo: pending clinical course    mg Intravenous Q12H   • ipratropium-albuterol  3 mL Nebulization 2 times daily   • collagenase   Topical Daily   • metRONIDAZOLE  250 mg Oral Q8H   • aspirin  81 mg Oral Daily   • cefepime  1 g Intravenous Q8H       dextrose 50%, Glucose-Vitamin C, ondanse prostatomegaly with bladder distention, correlate for chronic outlet obstruction. 3. Suspect partial left testicular retraction and hydrocele.

## 2018-01-05 NOTE — PROGRESS NOTES
[facility] Hospitalist Progress Note     PCP: None Pcp    CC: Follow up       Assessment/Plan:     Mr. Dago Adam is a 59 yo M with PMH of DM2- insulin dependent, HTN, stroke who presented with worsening L foot pain and swelling after injury to his foot a few days p daily, metformin 500 mg daily - hold  - accuchecks QID, hypoglycemic protocol, SSI  - BS uncontrolled  - wife thinks last HgA1c 11, repeat A1c 10.0  - continue insulin lantus to 90 units   - increased meal time insulin to 14 units tid  - if BS remains elev no focal deficits    Medications     • Insulin Aspart Pen  14 Units Subcutaneous TID CC   • insulin detemir  90 Units Subcutaneous Nightly   • Insulin Aspart Pen  1-11 Units Subcutaneous TID CC and HS   • enoxaparin  40 mg Subcutaneous Daily   • Clopidogre there is persistence or worsening of this finding, a followup MRI is recommended to assess for mass or other processes such as diabetic myonecrosis or myositis. 2. Mild prostatomegaly with bladder distention, correlate for chronic outlet obstruction.   3.

## 2018-01-05 NOTE — PLAN OF CARE
Problem: Diabetes/Glucose Control  Goal: Glucose maintained within prescribed range  INTERVENTIONS:  - Monitor Blood Glucose as ordered  - Assess for signs and symptoms of hyperglycemia and hypoglycemia  - Administer ordered medications to maintain glucose hours.  (3) Skin is assess for redness or skin breakdown. (4) Prompt care is given for incontinence.   Goal: Incision(s), wounds(s) or drain site(s) healing without S/S of infection  INTERVENTIONS:  - Assess and document skin integrity  - Assess and docume assist with strengthening/mobility  - Encourage toileting schedule   Outcome: Progressing  (1) Fall risk precautions are followed for safety and comfort. (2) Non-skid socks are worn for patient safety. (3) Bed alarm remains in use for safety.   (4) Call l

## 2018-01-05 NOTE — PHYSICAL THERAPY NOTE
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, S/P lt toe amputation on 12/25)    Problem List  Principal Problem:    Cellulitis in diabetic foot ([facility] [address]  Active Problems:    Norah Grier Normal  Dynamic Sitting: Good           Static Standing: Poor +  Dynamic Standing: Poor    ACTIVITY TOLERANCE  Shortness of breath with ambulation after 45'    AM-PAC '6-Clicks' INPATIENT SHORT FORM - BASIC MOBILITY  How much difficulty does the patient cu to demonstrate transfers Sit to/from Stand at assistance level: CGAx1 with walker - rolling   Goal #2  Current Status  CGA with RW as support    Goal #3 Patient is able to ambulate 50' feet with assist device: walker - rolling at assistance level:  CGAx1

## 2018-01-06 LAB
ANION GAP SERPL CALC-SCNC: 11 MMOL/L (ref 0–18)
BASOPHILS # BLD: 0.1 K/UL (ref 0–0.2)
BASOPHILS NFR BLD: 0 %
BUN SERPL-MCNC: 22 MG/DL (ref 8–20)
BUN/CREAT SERPL: 17.6 (ref 10–20)
CALCIUM SERPL-MCNC: 8.9 MG/DL (ref 8.5–10.5)
CHLORIDE SERPL-SCNC: 100 MMOL/L (ref 95–110)
CO2 SERPL-SCNC: 26 MMOL/L (ref 22–32)
CREAT SERPL-MCNC: 1.25 MG/DL (ref 0.5–1.5)
EOSINOPHIL # BLD: 0.1 K/UL (ref 0–0.7)
EOSINOPHIL NFR BLD: 1 %
ERYTHROCYTE [DISTWIDTH] IN BLOOD BY AUTOMATED COUNT: 14.6 % (ref 11–15)
GLUCOSE BLDC GLUCOMTR-MCNC: 185 MG/DL (ref 70–99)
GLUCOSE BLDC GLUCOMTR-MCNC: 205 MG/DL (ref 70–99)
GLUCOSE BLDC GLUCOMTR-MCNC: 210 MG/DL (ref 70–99)
GLUCOSE BLDC GLUCOMTR-MCNC: 272 MG/DL (ref 70–99)
GLUCOSE SERPL-MCNC: 195 MG/DL (ref 70–99)
HCT VFR BLD AUTO: 35.6 % (ref 41–52)
HGB BLD-MCNC: 11.6 G/DL (ref 13.5–17.5)
LYMPHOCYTES # BLD: 2.2 K/UL (ref 1–4)
LYMPHOCYTES NFR BLD: 12 %
MCH RBC QN AUTO: 27.3 PG (ref 27–32)
MCHC RBC AUTO-ENTMCNC: 32.5 G/DL (ref 32–37)
MCV RBC AUTO: 83.8 FL (ref 80–100)
MONOCYTES # BLD: 1.5 K/UL (ref 0–1)
MONOCYTES NFR BLD: 8 %
NEUTROPHILS # BLD AUTO: 15.4 K/UL (ref 1.8–7.7)
NEUTROPHILS NFR BLD: 80 %
OSMOLALITY UR CALC.SUM OF ELEC: 293 MOSM/KG (ref 275–295)
PLATELET # BLD AUTO: 398 K/UL (ref 140–400)
PMV BLD AUTO: 11.3 FL (ref 7.4–10.3)
POTASSIUM SERPL-SCNC: 4.1 MMOL/L (ref 3.3–5.1)
RBC # BLD AUTO: 4.25 M/UL (ref 4.5–5.9)
SODIUM SERPL-SCNC: 137 MMOL/L (ref 136–144)
VANCOMYCIN TROUGH SERPL-MCNC: 12.6 MCG/ML (ref 10–20)
WBC # BLD AUTO: 19.2 K/UL (ref 4–11)

## 2018-01-06 PROCEDURE — 97530 THERAPEUTIC ACTIVITIES: CPT

## 2018-01-06 PROCEDURE — 85025 COMPLETE CBC W/AUTO DIFF WBC: CPT | Performed by: HOSPITALIST

## 2018-01-06 PROCEDURE — 80048 BASIC METABOLIC PNL TOTAL CA: CPT | Performed by: HOSPITALIST

## 2018-01-06 PROCEDURE — 97116 GAIT TRAINING THERAPY: CPT

## 2018-01-06 PROCEDURE — 82962 GLUCOSE BLOOD TEST: CPT

## 2018-01-06 PROCEDURE — 80202 ASSAY OF VANCOMYCIN: CPT | Performed by: HOSPITALIST

## 2018-01-06 PROCEDURE — 94640 AIRWAY INHALATION TREATMENT: CPT

## 2018-01-06 NOTE — PLAN OF CARE
Problem: Diabetes/Glucose Control  Goal: Glucose maintained within prescribed range  INTERVENTIONS:  - Monitor Blood Glucose as ordered  - Assess for signs and symptoms of hyperglycemia and hypoglycemia  - Administer ordered medications to maintain glucose response  - Implement non-pharmacological measures as appropriate and evaluate response  - Consider cultural and social influences on pain and pain management  - Manage/alleviate anxiety  - Utilize distraction and/or relaxation techniques  - Monitor for op

## 2018-01-06 NOTE — PLAN OF CARE
Problem: Diabetes/Glucose Control  Goal: Glucose maintained within prescribed range  INTERVENTIONS:  - Monitor Blood Glucose as ordered  - Assess for signs and symptoms of hyperglycemia and hypoglycemia  - Administer ordered medications to maintain glucose reposition self while in bed every 2 hours.   Goal: Incision(s), wounds(s) or drain site(s) healing without S/S of infection  INTERVENTIONS:  - Assess and document skin integrity  - Assess and document dressing/incision, wound bed, drain sites and surroundi are followed to ensure safety. (2) Non-skid socks are worn for safety and comfort. (3) Call light within reach and patient instructed to use call light for assistance. (4) Bed alarm remains in use for safety.   (5) Hourly rounds are done to ensure safety

## 2018-01-06 NOTE — PHYSICAL THERAPY NOTE
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, S/P lt toe amputation on 12/25)    Problem List  Principal Problem:    Cellulitis in diabetic foot ([facility] [address]  Active Problems:    Sophie Sims etc.): A Little   -   Moving from lying on back to sitting on the side of the bed?: A Little   How much help from another person does the patient currently need. ..   -   Moving to and from a bed to a chair (including a wheelchair)?: A Little   -   Need to

## 2018-01-06 NOTE — PROGRESS NOTES
Keely Meraz is a 58year old male. Patient presents with:  Cellulitis (integumentary, infectious)      HPI:    Buttock ~ same; no new complaints    REVIEW OF SYSTEMS:   A comprehensive 11 point review of systems was completed.   Pertinent positives and likely transition to short course of PO on dc; wbc still high however  -buttock hematoma observe for now ;rx heat suggested; if no response repeat ct scan        1.              Lab Results  Component Value Date   WBC 19.2 01/06/2018   HGB 11.6 01/06/2018 (H) 0 - 20 mm/Hr   -HEMOGLOBIN A1C   Result Value Ref Range   Glycohemoglobin (HgA1c) 10.0 (H) 4.0 - 6.0 %   -MAGNESIUM   Result Value Ref Range   Magnesium 1.8 1.8 - 2.5 mg/dL   -BASIC METABOLIC PANEL (8)   Result Value Ref Range   Glucose 292 (H) 70 - 99 Non- 52 (L) >=60   GFR, -American >60 >=60   -BNP (B TYPE NATRIUERTIC PEPTIDE)   Result Value Ref Range   Beta Natriuretic Peptide 446 (H) 0 - 100 pg/mL   -CBC, PLATELET; NO DIFFERENTIAL   Result Value Ref Range   WBC 15.9 (H) 4.0 - 70 - 99 mg/dL   Sodium 134 (L) 136 - 144 mmol/L   Potassium 3.6 3.3 - 5.1 mmol/L   Chloride 98 95 - 110 mmol/L   CO2 29 22 - 32 mmol/L   BUN 11 8 - 20 mg/dL   Creatinine 1.12 0.50 - 1.50 mg/dL   Calcium, Total 8.1 (L) 8.5 - 10.5 mg/dL   BUN/CREA Ratio 9.8 0 - 18 mmol/L   Calculated Osmolality 292 275 - 295 mOsm/kg   GFR, Non- 55 (L) >=60   GFR, -American >60 >=60   -VANCOMYCIN TROUGH, SERUM   Result Value Ref Range   Vancomycin Trough 17.6 10.0 - 20.0 mcg/mL   -BASIC METABOLIC PANEL ( 295 mOsm/kg   GFR, Non- 59 (L) >=60   GFR, -American >60 >=60   -VANCOMYCIN TROUGH, SERUM   Result Value Ref Range   Vancomycin Trough 12.6 10.0 - 20.0 mcg/mL   -BASIC METABOLIC PANEL (8)   Result Value Ref Range   Glucose 195 (H) 70 Underlying bone with focal acute osteomyelitis. · Bone resection margin without acute osteomyelitis. [facility] REMOVED]    Embedded Images     Clinical Information I96 Gangrene Of Toe (Hcc).    [FORMATTING REMOVED]    Gross Description Specimen A is rece Ordering Location:     [facility]          Received:            01/03/2018 07:45 AM                                Operating Room                                                               Pathologist:           Jason Myers MD Representative sections are   submitted as follows: A1 - closest skin and soft tissue margin, shaved; A2   - bone resection margin, shaved (inked blue), following decalcification;   A3 - anterior tibial vessels (margin inked blue), following   decalcificat Value Ref Range   POC Glucose  292 (H) 70 - 99   -POCT GLUCOSE   Result Value Ref Range   POC Glucose  271 (H) 70 - 99   -POCT GLUCOSE   Result Value Ref Range   POC Glucose  333 (H) 70 - 99   -POCT GLUCOSE   Result Value Ref Range   POC Glucose  139 (H) 7 Range   POC Glucose  224 (H) 70 - 99   -POCT GLUCOSE   Result Value Ref Range   POC Glucose  286 (H) 70 - 99   -POCT GLUCOSE   Result Value Ref Range   POC Glucose  372 (H) 70 - 99   -POCT GLUCOSE   Result Value Ref Range   POC Glucose  95 70 - 99   -POCT Days    -BLOOD CULTURE   Result Value Ref Range   Blood Culture Result No Growth 5 Days    -CBC W/ DIFFERENTIAL   Result Value Ref Range   WBC 13.5 (H) 4.0 - 11.0 K/UL   RBC 4.44 (L) 4.50 - 5.90 M/UL   HGB 12.4 (L) 13.5 - 17.5 g/dL   HCT 36.6 (L) 41.0 - 52 - 0.7 K/UL   Basophil Absolute 0.1 0.0 - 0.2 K/UL   -CBC W/ DIFFERENTIAL   Result Value Ref Range   WBC 14.4 (H) 4.0 - 11.0 K/UL   RBC 4.37 (L) 4.50 - 5.90 M/UL   HGB 12.0 (L) 13.5 - 17.5 g/dL   HCT 35.9 (L) 41.0 - 52.0 %   MCV 82.3 80.0 - 100.0 fL   MCH 2 0.2 K/UL   -CBC W/ DIFFERENTIAL   Result Value Ref Range   WBC 11.5 (H) 4.0 - 11.0 K/UL   RBC 4.45 (L) 4.50 - 5.90 M/UL   HGB 12.3 (L) 13.5 - 17.5 g/dL   HCT 37.0 (L) 41.0 - 52.0 %   MCV 83.3 80.0 - 100.0 fL   MCH 27.6 27.0 - 32.0 pg   MCHC 33.2 32.0 - 37.

## 2018-01-06 NOTE — PROGRESS NOTES
[facility] Hospitalist Progress Note     PCP: None Pcp    CC: Follow up       Assessment/Plan:   Mr. Jose A Mccall is a 57 yo M with PMH of DM2- insulin dependent, HTN, stroke who presented with worsening L foot pain and swelling after injury to his foot a few days ron hyperglycemia   - home regimen: basaglar 70 units daily, victoza 1.2 mg daily, metformin 500 mg daily - hold  - accuchecks QID, hypoglycemic protocol, SSI  - BS uncontrolled  - wife thinks last HgA1c 11, repeat A1c 10.0  - continue insulin lantus to 90 uni focal deficits    Medications     • [START ON 1/7/2018] Insulin Aspart Pen  18 Units Subcutaneous TID CC   • docusate sodium  100 mg Oral BID   • insulin detemir  90 Units Subcutaneous Nightly   • Insulin Aspart Pen  1-11 Units Subcutaneous TID CC and HS advised. If there is persistence or worsening of this finding, a followup MRI is recommended to assess for mass or other processes such as diabetic myonecrosis or myositis.   2. Mild prostatomegaly with bladder distention, correlate for chronic outlet obstr

## 2018-01-06 NOTE — PROGRESS NOTES
The patient is doing very well and his pain level is significantly reduced. He will be ready for discharge to the rehabilitation facility when a bed is available.

## 2018-01-07 LAB
ANION GAP SERPL CALC-SCNC: 7 MMOL/L (ref 0–18)
BASOPHILS # BLD: 0.1 K/UL (ref 0–0.2)
BASOPHILS NFR BLD: 1 %
BUN SERPL-MCNC: 20 MG/DL (ref 8–20)
BUN/CREAT SERPL: 17.2 (ref 10–20)
CALCIUM SERPL-MCNC: 8.3 MG/DL (ref 8.5–10.5)
CHLORIDE SERPL-SCNC: 102 MMOL/L (ref 95–110)
CO2 SERPL-SCNC: 26 MMOL/L (ref 22–32)
CREAT SERPL-MCNC: 1.16 MG/DL (ref 0.5–1.5)
EOSINOPHIL # BLD: 0.1 K/UL (ref 0–0.7)
EOSINOPHIL NFR BLD: 1 %
ERYTHROCYTE [DISTWIDTH] IN BLOOD BY AUTOMATED COUNT: 14.9 % (ref 11–15)
GLUCOSE BLDC GLUCOMTR-MCNC: 103 MG/DL (ref 70–99)
GLUCOSE BLDC GLUCOMTR-MCNC: 137 MG/DL (ref 70–99)
GLUCOSE BLDC GLUCOMTR-MCNC: 236 MG/DL (ref 70–99)
GLUCOSE BLDC GLUCOMTR-MCNC: 286 MG/DL (ref 70–99)
GLUCOSE SERPL-MCNC: 174 MG/DL (ref 70–99)
HCT VFR BLD AUTO: 30.2 % (ref 41–52)
HGB BLD-MCNC: 10.2 G/DL (ref 13.5–17.5)
LYMPHOCYTES # BLD: 1.5 K/UL (ref 1–4)
LYMPHOCYTES NFR BLD: 11 %
MCH RBC QN AUTO: 28.1 PG (ref 27–32)
MCHC RBC AUTO-ENTMCNC: 33.8 G/DL (ref 32–37)
MCV RBC AUTO: 83 FL (ref 80–100)
MONOCYTES # BLD: 1.2 K/UL (ref 0–1)
MONOCYTES NFR BLD: 9 %
NEUTROPHILS # BLD AUTO: 10.8 K/UL (ref 1.8–7.7)
NEUTROPHILS NFR BLD: 79 %
OSMOLALITY UR CALC.SUM OF ELEC: 287 MOSM/KG (ref 275–295)
PLATELET # BLD AUTO: 268 K/UL (ref 140–400)
PMV BLD AUTO: 10.7 FL (ref 7.4–10.3)
POTASSIUM SERPL-SCNC: 4 MMOL/L (ref 3.3–5.1)
RBC # BLD AUTO: 3.64 M/UL (ref 4.5–5.9)
SODIUM SERPL-SCNC: 135 MMOL/L (ref 136–144)
WBC # BLD AUTO: 13.7 K/UL (ref 4–11)

## 2018-01-07 PROCEDURE — 82962 GLUCOSE BLOOD TEST: CPT

## 2018-01-07 PROCEDURE — 94640 AIRWAY INHALATION TREATMENT: CPT

## 2018-01-07 PROCEDURE — 80048 BASIC METABOLIC PNL TOTAL CA: CPT | Performed by: HOSPITALIST

## 2018-01-07 PROCEDURE — 97110 THERAPEUTIC EXERCISES: CPT

## 2018-01-07 PROCEDURE — 97116 GAIT TRAINING THERAPY: CPT

## 2018-01-07 PROCEDURE — 85025 COMPLETE CBC W/AUTO DIFF WBC: CPT | Performed by: HOSPITALIST

## 2018-01-07 NOTE — PROGRESS NOTES
[facility] Hospitalist Progress Note     PCP: None Pcp    CC: Follow up       Assessment/Plan:   Mr. Pati Ortiz is a 59 yo M with PMH of DM2- insulin dependent, HTN, stroke who presented with worsening L foot pain and swelling after injury to his foot a few days ron metformin 500 mg daily - hold  - accuchecks QID, hypoglycemic protocol, SSI  - BS uncontrolled  - wife thinks last HgA1c 11, repeat A1c 10.0  - continue insulin lantus to 90 units   - increased meal time insulin to 18 units tid  - if BS remains elevated, w Aspart Pen  18 Units Subcutaneous TID CC   • docusate sodium  100 mg Oral BID   • insulin detemir  90 Units Subcutaneous Nightly   • Insulin Aspart Pen  1-11 Units Subcutaneous TID CC and HS   • enoxaparin  40 mg Subcutaneous Daily   • Clopidogrel Bisulfat

## 2018-01-07 NOTE — PROGRESS NOTES
[facility] dosing service    Follow-up Pharmacokinetic Consult for Vancomycin Dosing     Jesenia Catherine is a 58year old male admitted on 12/24/17 who is being treated for diabetic foot/possible aspiration pneumonia post op.   Patient is on day 15 of Vanc Tissue Smear 2+ WBCs seen N/A   Tissue Smear 2+ Gram Positive Cocci N/A   *Culture results found, see result in Chart Review     3.  ANAEROBIC CULTURE Status: Abnormal   Collection Time: 12/25/17 4:39 PM   Result Value Ref Range   Anaerobic Culture 4+ wisam

## 2018-01-07 NOTE — PHYSICAL THERAPY NOTE
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, S/P lt toe amputation on 12/25)    Problem List  Principal Problem:    Cellulitis in diabetic foot ([facility] [address]  Active Problems:    [place] [employer] work    OBJECTIVE  Precautions: Limb alert - left    WEIGHT BEARING RESTRICTION  Weight Bearing Restriction: L lower extremity           L Lower Extremity: Non-Weight Bearing    PAIN ASSESSMENT   Ratin  Location:  (c/o Lt stump surgical site, RN is dafne relief    THERAPEUTIC EXERCISES  Lower Extremity Alternating marching  Ankle pumps  Glut sets  Hip AB/AD  Knee extension  LAQ  Quad sets  SLR     Position Sitting and Supine       Patient End of Session: Up in chair;Needs met;Call light within reach;RN dafne

## 2018-01-08 ENCOUNTER — APPOINTMENT (OUTPATIENT)
Dept: MRI IMAGING | Facility: HOSPITAL | Age: 63
DRG: 616 | End: 2018-01-08
Attending: HOSPITALIST
Payer: MEDICAID

## 2018-01-08 LAB
ANION GAP SERPL CALC-SCNC: 7 MMOL/L (ref 0–18)
BASOPHILS # BLD: 0.1 K/UL (ref 0–0.2)
BASOPHILS NFR BLD: 1 %
BUN SERPL-MCNC: 22 MG/DL (ref 8–20)
BUN/CREAT SERPL: 22.2 (ref 10–20)
CALCIUM SERPL-MCNC: 8.4 MG/DL (ref 8.5–10.5)
CHLORIDE SERPL-SCNC: 103 MMOL/L (ref 95–110)
CO2 SERPL-SCNC: 24 MMOL/L (ref 22–32)
CREAT SERPL-MCNC: 0.99 MG/DL (ref 0.5–1.5)
EOSINOPHIL # BLD: 0.2 K/UL (ref 0–0.7)
EOSINOPHIL NFR BLD: 2 %
ERYTHROCYTE [DISTWIDTH] IN BLOOD BY AUTOMATED COUNT: 14.9 % (ref 11–15)
GLUCOSE BLDC GLUCOMTR-MCNC: 150 MG/DL (ref 70–99)
GLUCOSE BLDC GLUCOMTR-MCNC: 220 MG/DL (ref 70–99)
GLUCOSE BLDC GLUCOMTR-MCNC: 230 MG/DL (ref 70–99)
GLUCOSE BLDC GLUCOMTR-MCNC: 354 MG/DL (ref 70–99)
GLUCOSE BLDC GLUCOMTR-MCNC: 65 MG/DL (ref 70–99)
GLUCOSE BLDC GLUCOMTR-MCNC: 66 MG/DL (ref 70–99)
GLUCOSE SERPL-MCNC: 91 MG/DL (ref 70–99)
HCT VFR BLD AUTO: 30.3 % (ref 41–52)
HGB BLD-MCNC: 10.3 G/DL (ref 13.5–17.5)
LYMPHOCYTES # BLD: 1.7 K/UL (ref 1–4)
LYMPHOCYTES NFR BLD: 15 %
MCH RBC QN AUTO: 27.8 PG (ref 27–32)
MCHC RBC AUTO-ENTMCNC: 33.9 G/DL (ref 32–37)
MCV RBC AUTO: 81.8 FL (ref 80–100)
MONOCYTES # BLD: 1 K/UL (ref 0–1)
MONOCYTES NFR BLD: 9 %
NEUTROPHILS # BLD AUTO: 7.9 K/UL (ref 1.8–7.7)
NEUTROPHILS NFR BLD: 73 %
OSMOLALITY UR CALC.SUM OF ELEC: 281 MOSM/KG (ref 275–295)
PLATELET # BLD AUTO: 256 K/UL (ref 140–400)
PMV BLD AUTO: 10 FL (ref 7.4–10.3)
POTASSIUM SERPL-SCNC: 3.8 MMOL/L (ref 3.3–5.1)
RBC # BLD AUTO: 3.7 M/UL (ref 4.5–5.9)
SODIUM SERPL-SCNC: 134 MMOL/L (ref 136–144)
WBC # BLD AUTO: 10.8 K/UL (ref 4–11)

## 2018-01-08 PROCEDURE — 82962 GLUCOSE BLOOD TEST: CPT

## 2018-01-08 PROCEDURE — 85025 COMPLETE CBC W/AUTO DIFF WBC: CPT | Performed by: HOSPITALIST

## 2018-01-08 PROCEDURE — A9575 INJ GADOTERATE MEGLUMI 0.1ML: HCPCS | Performed by: HOSPITALIST

## 2018-01-08 PROCEDURE — 80048 BASIC METABOLIC PNL TOTAL CA: CPT | Performed by: HOSPITALIST

## 2018-01-08 PROCEDURE — 97535 SELF CARE MNGMENT TRAINING: CPT

## 2018-01-08 PROCEDURE — 72197 MRI PELVIS W/O & W/DYE: CPT | Performed by: HOSPITALIST

## 2018-01-08 PROCEDURE — 94640 AIRWAY INHALATION TREATMENT: CPT

## 2018-01-08 PROCEDURE — 97530 THERAPEUTIC ACTIVITIES: CPT

## 2018-01-08 RX ORDER — AMOXICILLIN AND CLAVULANATE POTASSIUM 875; 125 MG/1; MG/1
1 TABLET, FILM COATED ORAL EVERY 12 HOURS SCHEDULED
Status: DISCONTINUED | OUTPATIENT
Start: 2018-01-08 | End: 2018-01-09

## 2018-01-08 RX ORDER — POTASSIUM CHLORIDE 20 MEQ/1
40 TABLET, EXTENDED RELEASE ORAL ONCE
Status: COMPLETED | OUTPATIENT
Start: 2018-01-08 | End: 2018-01-08

## 2018-01-08 RX ORDER — NALOXONE HYDROCHLORIDE 4 MG/.1ML
4 SPRAY, METERED NASAL AS NEEDED
Qty: 1 EACH | Refills: 0 | Status: ON HOLD | OUTPATIENT
Start: 2018-01-08 | End: 2018-06-27

## 2018-01-08 RX ORDER — IPRATROPIUM BROMIDE AND ALBUTEROL SULFATE 2.5; .5 MG/3ML; MG/3ML
3 SOLUTION RESPIRATORY (INHALATION) EVERY 4 HOURS PRN
Status: DISCONTINUED | OUTPATIENT
Start: 2018-01-08 | End: 2018-01-09

## 2018-01-08 RX ORDER — HYDROCODONE BITARTRATE AND ACETAMINOPHEN 5; 325 MG/1; MG/1
1-2 TABLET ORAL EVERY 4 HOURS PRN
Qty: 50 TABLET | Refills: 0 | Status: ON HOLD | OUTPATIENT
Start: 2018-01-08 | End: 2018-06-27

## 2018-01-08 NOTE — PROGRESS NOTES
[facility] Hospitalist Progress Note     PCP: None Pcp    CC: Follow up       Assessment/Plan:   Mr. Autumn Ramsey is a 59 yo M with PMH of DM2- insulin dependent, HTN, stroke who presented with worsening L foot pain and swelling after injury to his foot a few days ron am  - home regimen: basaglar 70 units daily, victoza 1.2 mg daily, metformin 500 mg daily - hold  - accuchecks QID, hypoglycemic protocol, SSI  - BS uncontrolled  - wife thinks last HgA1c 11, repeat A1c 10.0  - reduce insulin lantus to 80 units   - increas TTP  Psych: mood stable, cooperative  Neuro: no focal deficits    Medications     • insulin detemir  80 Units Subcutaneous Nightly   • Amoxicillin-Pot Clavulanate  1 tablet Oral 2 times per day   • Insulin Aspart Pen  18 Units Subcutaneous TID CC   • docus complication. 3. Prostatomegaly. 4. Small to moderate left hydrocele. 5. Lesser incidental findings as above.

## 2018-01-08 NOTE — PROGRESS NOTES
Maxi Gray is a 58year old male. Patient presents with:  Cellulitis (integumentary, infectious)      HPI:    Feels well;voicing no complaints    REVIEW OF SYSTEMS:   A comprehensive 11 point review of systems was completed.   Pertinent positives and IV abx, likely transition to short course of PO on dc; wbc still high however but improved[finally]  -buttock hematoma observe for now ;rx heat suggested; if no response repeat ct scan        1.              Lab Results  Component Value Date   WBC 13.7 01/0 Value Ref Range   Sed Rate 65 (H) 0 - 20 mm/Hr   -HEMOGLOBIN A1C   Result Value Ref Range   Glycohemoglobin (HgA1c) 10.0 (H) 4.0 - 6.0 %   -MAGNESIUM   Result Value Ref Range   Magnesium 1.8 1.8 - 2.5 mg/dL   -BASIC METABOLIC PANEL (8)   Result Value Ref R 275 - 295 mOsm/kg   GFR, Non-African American 52 (L) >=60   GFR, -American >60 >=60   -BNP (B TYPE NATRIUERTIC PEPTIDE)   Result Value Ref Range   Beta Natriuretic Peptide 446 (H) 0 - 100 pg/mL   -CBC, PLATELET; NO DIFFERENTIAL   Result Value Ref Ra Range   Glucose 157 (H) 70 - 99 mg/dL   Sodium 134 (L) 136 - 144 mmol/L   Potassium 3.6 3.3 - 5.1 mmol/L   Chloride 98 95 - 110 mmol/L   CO2 29 22 - 32 mmol/L   BUN 11 8 - 20 mg/dL   Creatinine 1.12 0.50 - 1.50 mg/dL   Calcium, Total 8.1 (L) 8.5 - 10.5 mg/ 10.0 - 20.0   Anion Gap 8 0 - 18 mmol/L   Calculated Osmolality 292 275 - 295 mOsm/kg   GFR, Non- 55 (L) >=60   GFR, -American >60 >=60   -VANCOMYCIN TROUGH, SERUM   Result Value Ref Range   Vancomycin Trough 17.6 10.0 - 20.0 mcg/mL Calculated Osmolality 285 275 - 295 mOsm/kg   GFR, Non- 59 (L) >=60   GFR, -American >60 >=60   -VANCOMYCIN TROUGH, SERUM   Result Value Ref Range   Vancomycin Trough 12.6 10.0 - 20.0 mcg/mL   -BASIC METABOLIC PANEL (8)   Result Va 1. nonviable skin left foot                                                        B) - Foot,left, 2. partial fifth ray left foot *SHARED SPECIMEN*                           Final Diagnosis: A.   Left foot non viable skin:  · Skin and subcutaneous tissue wi underlying bone,   following decalcification; B3, skin and soft tissue margin, shave and   detached soft tissue fragments (skin and soft tissue margin inked green).    (aa)    Omar Palafox M.D./[facility] REMOVED]    Interpretation Abnormal (A)    -LUONG amputation site of the fifth digit. The first   four digits contain tan, roughened nail.  There is a 10.7 x 6.5 cm   variegated red-tan to yellow, hemorrhagic wet ulcer located at the dorsal   surface of the foot and extending into the lateral aspect by the -POCT GLUCOSE   Result Value Ref Range   POC Glucose  328 (H) 70 - 99   -POCT GLUCOSE   Result Value Ref Range   POC Glucose  152 (H) 70 - 99   -POCT GLUCOSE   Result Value Ref Range   POC Glucose  187 (H) 70 - 99   -POCT GLUCOSE   Result Value Ref Range GLUCOSE   Result Value Ref Range   POC Glucose  214 (H) 70 - 99   -POCT GLUCOSE   Result Value Ref Range   POC Glucose  239 (H) 70 - 99   -POCT GLUCOSE   Result Value Ref Range   POC Glucose  139 (H) 70 - 99   -POCT GLUCOSE   Result Value Ref Range   POC G Range   Hold Lavender Auto Resulted    -BLOOD CULTURE   Result Value Ref Range   Blood Culture Result No Growth 5 Days    -BLOOD CULTURE   Result Value Ref Range   Blood Culture Result No Growth 5 Days    -AEROBIC BACTERIAL CULTURE   Result Value Ref Range 10.3 fL   Neutrophil % 81 %   Lymphocyte % 9 %   Monocyte % 9 %   Eosinophil % 1 %   Basophil % 0 %   Neutrophil Absolute 11.9 (H) 1.8 - 7.7 K/UL   Lymphocyte Absolute 1.4 1.0 - 4.0 K/UL   Monocyte Absolute 1.3 (H) 0.0 - 1.0 K/UL   Eosinophil Absolute 0.1 %   Monocyte % 9 %   Eosinophil % 2 %   Basophil % 1 %   Neutrophil Absolute 10.1 (H) 1.8 - 7.7 K/UL   Lymphocyte Absolute 1.8 1.0 - 4.0 K/UL   Monocyte Absolute 1.2 (H) 0.0 - 1.0 K/UL   Eosinophil Absolute 0.2 0.0 - 0.7 K/UL   Basophil Absolute 0.1 0.0 - Basophil % 0 %   Neutrophil Absolute 15.4 (H) 1.8 - 7.7 K/UL   Lymphocyte Absolute 2.2 1.0 - 4.0 K/UL   Monocyte Absolute 1.5 (H) 0.0 - 1.0 K/UL   Eosinophil Absolute 0.1 0.0 - 0.7 K/UL   Basophil Absolute 0.1 0.0 - 0.2 K/UL   -CBC W/ DIFFERENTIAL   Resu

## 2018-01-08 NOTE — OCCUPATIONAL THERAPY NOTE
OCCUPATIONAL THERAPY TREATMENT NOTE - INPATIENT     Room Number: 554/554-A          Presenting Problem: L bka 1/2/18    Problem List  Principal Problem:    Cellulitis in diabetic foot ([facility] [address]  Active Problems:    [facility]      ASSESSMENT   RN cl Form  How much help from another person does the patient currently need…  -   Putting on and taking off regular lower body clothing?: A Little  -   Bathing (including washing, rinsing, drying)?: A Little  -   Toileting, which includes using toilet, bedpan

## 2018-01-08 NOTE — CM/SW NOTE
KELSEY following up on d/c planning for the patient. Prior referral sent to [facility] and KELSEY left a message with Griselda Evans to follow-up on the referral. BRENT/Oriana working on the insurance auth with [employer]. 3pm: Updates sent to [facility].   They have accepted

## 2018-01-08 NOTE — PROGRESS NOTES
[facility] AND [facility] Infectious Disease  Progress Note    Onetha Ast Patient Status:  Inpatient    1955 MRN [id number]   Location [facility] 5SW/SE Attending Jennifer Fitch DO   Hosp Day # 15 PCP None Pcp     Subjective:  Patient seen/exa further imaging pending for today     3.  Leukocytosis due to the above  - At 10K today, will continue to trend     4.  DM II  - Needs tight glycemic control for optimal treatment of his infection     5.  Disposition - stable from ID standpoint.   Evidently

## 2018-01-08 NOTE — CM/SW NOTE
Updates sent to [facility] for [employer] authorization f [id number].   Need new PT OT notes, Rn notified  Case number [id number]    [id number] [facility] at this time they will call when Sathya Gagan received    [facility] [id number]

## 2018-01-09 VITALS
SYSTOLIC BLOOD PRESSURE: 95 MMHG | RESPIRATION RATE: 18 BRPM | HEART RATE: 102 BPM | WEIGHT: 197.13 LBS | HEIGHT: 72.01 IN | OXYGEN SATURATION: 98 % | TEMPERATURE: 98 F | DIASTOLIC BLOOD PRESSURE: 67 MMHG | BODY MASS INDEX: 26.7 KG/M2

## 2018-01-09 LAB
ANION GAP SERPL CALC-SCNC: 10 MMOL/L (ref 0–18)
BASOPHILS # BLD: 0.1 K/UL (ref 0–0.2)
BASOPHILS NFR BLD: 1 %
BUN SERPL-MCNC: 21 MG/DL (ref 8–20)
BUN/CREAT SERPL: 19.8 (ref 10–20)
CALCIUM SERPL-MCNC: 8.5 MG/DL (ref 8.5–10.5)
CHLORIDE SERPL-SCNC: 99 MMOL/L (ref 95–110)
CO2 SERPL-SCNC: 25 MMOL/L (ref 22–32)
CREAT SERPL-MCNC: 1.06 MG/DL (ref 0.5–1.5)
EOSINOPHIL # BLD: 0.2 K/UL (ref 0–0.7)
EOSINOPHIL NFR BLD: 2 %
ERYTHROCYTE [DISTWIDTH] IN BLOOD BY AUTOMATED COUNT: 14.9 % (ref 11–15)
GLUCOSE BLDC GLUCOMTR-MCNC: 174 MG/DL (ref 70–99)
GLUCOSE BLDC GLUCOMTR-MCNC: 211 MG/DL (ref 70–99)
GLUCOSE BLDC GLUCOMTR-MCNC: 318 MG/DL (ref 70–99)
GLUCOSE SERPL-MCNC: 195 MG/DL (ref 70–99)
HCT VFR BLD AUTO: 30.9 % (ref 41–52)
HGB BLD-MCNC: 10.5 G/DL (ref 13.5–17.5)
LYMPHOCYTES # BLD: 1.5 K/UL (ref 1–4)
LYMPHOCYTES NFR BLD: 16 %
MCH RBC QN AUTO: 28 PG (ref 27–32)
MCHC RBC AUTO-ENTMCNC: 34.1 G/DL (ref 32–37)
MCV RBC AUTO: 82.3 FL (ref 80–100)
MONOCYTES # BLD: 0.9 K/UL (ref 0–1)
MONOCYTES NFR BLD: 10 %
NEUTROPHILS # BLD AUTO: 6.9 K/UL (ref 1.8–7.7)
NEUTROPHILS NFR BLD: 72 %
OSMOLALITY UR CALC.SUM OF ELEC: 286 MOSM/KG (ref 275–295)
PLATELET # BLD AUTO: 269 K/UL (ref 140–400)
PMV BLD AUTO: 10.8 FL (ref 7.4–10.3)
POTASSIUM SERPL-SCNC: 4.1 MMOL/L (ref 3.3–5.1)
POTASSIUM SERPL-SCNC: 4.1 MMOL/L (ref 3.3–5.1)
RBC # BLD AUTO: 3.76 M/UL (ref 4.5–5.9)
SODIUM SERPL-SCNC: 134 MMOL/L (ref 136–144)
WBC # BLD AUTO: 9.5 K/UL (ref 4–11)

## 2018-01-09 PROCEDURE — 97110 THERAPEUTIC EXERCISES: CPT

## 2018-01-09 PROCEDURE — 82962 GLUCOSE BLOOD TEST: CPT

## 2018-01-09 PROCEDURE — 85025 COMPLETE CBC W/AUTO DIFF WBC: CPT | Performed by: HOSPITALIST

## 2018-01-09 PROCEDURE — 80048 BASIC METABOLIC PNL TOTAL CA: CPT | Performed by: HOSPITALIST

## 2018-01-09 PROCEDURE — 97116 GAIT TRAINING THERAPY: CPT

## 2018-01-09 PROCEDURE — 84132 ASSAY OF SERUM POTASSIUM: CPT | Performed by: HOSPITALIST

## 2018-01-09 RX ORDER — CLOPIDOGREL BISULFATE 75 MG/1
75 TABLET ORAL DAILY
Qty: 30 TABLET | Refills: 0 | Status: SHIPPED | OUTPATIENT
Start: 2018-01-10 | End: 2018-04-19 | Stop reason: ALTCHOICE

## 2018-01-09 RX ORDER — POLYETHYLENE GLYCOL 3350 17 G/17G
17 POWDER, FOR SOLUTION ORAL DAILY PRN
Qty: 30 EACH | Refills: 0 | Status: SHIPPED | OUTPATIENT
Start: 2018-01-09 | End: 2018-01-19

## 2018-01-09 RX ORDER — IPRATROPIUM BROMIDE AND ALBUTEROL SULFATE 2.5; .5 MG/3ML; MG/3ML
3 SOLUTION RESPIRATORY (INHALATION) EVERY 6 HOURS PRN
Qty: 3 ML | Refills: 0 | Status: ON HOLD | OUTPATIENT
Start: 2018-01-09 | End: 2018-06-27

## 2018-01-09 RX ORDER — AMOXICILLIN AND CLAVULANATE POTASSIUM 875; 125 MG/1; MG/1
1 TABLET, FILM COATED ORAL EVERY 12 HOURS SCHEDULED
Qty: 14 TABLET | Refills: 0 | Status: SHIPPED | OUTPATIENT
Start: 2018-01-09 | End: 2018-01-16

## 2018-01-09 RX ORDER — PSEUDOEPHEDRINE HCL 30 MG
100 TABLET ORAL 2 TIMES DAILY
Qty: 60 CAPSULE | Refills: 0 | Status: ON HOLD | OUTPATIENT
Start: 2018-01-09 | End: 2018-06-27

## 2018-01-09 NOTE — PAYOR COMM NOTE
REF: [id number]  - PER IEXCHANGE APPROVED  12/24/17 - 1/2/18-  REQUESTING ADDITIONAL DAYS      1/2/18  Assessment/Plan:      Mr. Miranda Montoya is a 57 yo M with PMH of DM2- insulin dependent, HTN, stroke who presented with worsening L foot pain and swelling after mg daily - hold  - accuchecks QID, hypoglycemic protocol, SSI  - wife thinks last HgA1c 11, repeat A1c 10.0  - improved, continue insulin at 80 units  And SSI post op.   Preop levemir decreased to 60 units and AM BS was in the 300's     HTN  - BP normal  - CA  8.3*  8.5  8.6   MG  1.8  1.8  1.9   GLU  127*  110*  342*        1/4/18  Medications:     Current Facility-Administered Medications:   •  Insulin Aspart Pen (NOVOLOG) 100 UNIT/ML flexpen 10 Units, 10 Units, Subcutaneous, TID CC  •  Insulin Aspart Pe [facility]) injection 4 mg, 4 mg, Intravenous, Q4H PRN  •  HYDROcodone-acetaminophen (NORCO) 5-325 MG per tab 1-2 tablet, 1-2 tablet, Oral, Q4H PRN  •  Normal Saline Flush 0.9 % injection 3 mL, 3 mL, Intravenous, PRN  •  ondansetron HCl (ZOFRAN) injection 4 m Close clinical followup advised. If there is persistence or worsening of this finding, a followup MRI is recommended to assess for mass or other processes such as diabetic myonecrosis or myositis.   2. Mild prostatomegaly with bladder distention, correlate heal well.   S/P left BKA on 1/2/18.       L foot pain/redness/concern for necrosis of L 5th toe  - seen in ED on 12/21/17 for injury, started on PO keflex, then presented w/ worsening, pain, swelling, redness, and darkened L 5th toe  - MRI foot did not sukhdev increase insulin further     HTN  - BP normal  - not on home meds     Stroke  - resume aspirin, started on plavix per vascular       FN:  - IVF: stopped IVF  - Diet: DM2     DVT Prophy: SCD,  Lovenox  Lines: PIV    Subjective      Pain controlled,.  No  Com at 01/08/18 1202  Last data filed at 01/08/18 0500    Gross per 24 hour   Intake              700 ml   Output                0 ml   Net              700 ml         Physical Exam:  General: Awake, alert, non-tox, NAD. HEENT:  Oropharynx clear, trachea ML.

## 2018-01-09 NOTE — PLAN OF CARE
I was called to patient's room by physical therapist who reported that patient was requesting pain medication.  I went to room to administer pain medication, but patient was in restroom washing up, and said he will let me know when he is ready for pain medi

## 2018-01-09 NOTE — PLAN OF CARE
Patient presently sitting up a side of bed eating breakfast. Vital signs taken and stable. Patient were given morning medications as ordered by the doctor. Patient denied any pain or discomfort at present time. Patient's son present at bedside.  Call light

## 2018-01-09 NOTE — PROGRESS NOTES
The patient is doing well and his amputation stump dressing is intact. He still has left ischial tuberosity tenderness but his MRI does not show any significant abnormality in that area that requires any further investigation or treatment.   The patient wi

## 2018-01-09 NOTE — CM/SW NOTE
Patient accepted at [facility] bed 109[address]2. ([address] )  No bed at [place] [facility] [place]- not in network. Patient and family in agreement for [facility].  Patient and family requesting [facility] transport and

## 2018-01-09 NOTE — PLAN OF CARE
Problem: Diabetes/Glucose Control  Goal: Glucose maintained within prescribed range  INTERVENTIONS:  - Monitor Blood Glucose as ordered  - Assess for signs and symptoms of hyperglycemia and hypoglycemia  - Administer ordered medications to maintain glucose needed   Outcome: Progressing  (1) Assess skin for redness or skin breakdown. (2) Patient is encouraged to turn and reposition self while in bed every 2 hours. (3) Patient skin kept clean, dry, and intact.      Goal: Incision(s), wounds(s) or drain site(s assistance with activity based on assessment  - Modify environment to reduce risk of injury  - Provide assistive devices as appropriate  - Consider OT/PT consult to assist with strengthening/mobility  - Encourage toileting schedule   Outcome: Progressing

## 2018-01-09 NOTE — PLAN OF CARE
Problem: Diabetes/Glucose Control  Goal: Glucose maintained within prescribed range  INTERVENTIONS:  - Monitor Blood Glucose as ordered  - Assess for signs and symptoms of hyperglycemia and hypoglycemia  - Administer ordered medications to maintain glucose intact  INTERVENTIONS  - Assess and document skin integrity  - Monitor for areas of redness and/or skin breakdown  - Initiate interventions, skin care algorithm/standards of care as needed   Outcome: Progressing  (1) Skin will be kept clean, dry, and intac affect risk of falls.   - [place] fall precautions as indicated by assessment.  - Educate pt/family on patient safety including physical limitations  - Instruct pt to call for assistance with activity based on assessment  - Modify environment to reduce ri

## 2018-01-09 NOTE — PHYSICAL THERAPY NOTE
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 Instructed pt to walk to the restroom while in the hosp with assist to improved fucntional task tolerance, balance and strength. Pt will benefit from ongoing PT to continue to improve strength, endurance, balance, and gait in order to facilitat wheelchair)?: A Little   -   Need to walk in hospital room?: A Little   -   Climbing 3-5 steps with a railing?: A Lot    AM-PAC Score:  Raw Score: 18   PT Approx Degree of Impairment Score: 46.58%   Standardized Score (AM-PAC Scale): 43.63   CMS Modifier ( progress        Goal #6     Goal #6  Current Status

## 2018-01-09 NOTE — CM/SW NOTE
SW informed that the patient is stable for d/c today. SW waiting on insurance approval from [place] and a bed from [facility]  [id number]

## 2018-01-09 NOTE — PLAN OF CARE
Report called and given to nurse, Ed Shipley at [facility] extended care rehab facility. Patient transported by [employer]. Saline lock removed. Vital signs taken and stable. [facility] sent with copied chart and prescriptions.  Discharge to rehab fa

## 2018-01-09 NOTE — CM/SW NOTE
Message left with [employer] regarding authrization for Acute Rehab    1100 per [employer] second review sent to Medical Director    1250 per [employer] patient was denied acute rehab.  Choice for peer to peer at [place] [phone] today or to pursue subacute rehab  MD paged    [phone] E

## 2018-01-09 NOTE — PROGRESS NOTES
[facility] Infectious Disease  Progress Note    Steve Castro Patient Status:  Inpatient    1955 MRN [id number]   Location [facility] 5SW/SE Attending Chon Kraft DO   Hosp Day # 16 PCP None Pcp     Subjective:  Patient seen/exa tight glycemic control for optimal treatment of his infection     5.  Disposition - stable from ID standpoint. Rx on chart for 7 more days of p.o. Augmentin. Will sign off - only awaiting insurance. Please call with any further questions. Pamela Martinez

## 2018-01-10 ENCOUNTER — TELEPHONE (OUTPATIENT)
Dept: OTHER | Age: 63
End: 2018-01-10

## 2018-01-10 ENCOUNTER — SNF/IP PROF CHARGE ONLY (OUTPATIENT)
Dept: FAMILY MEDICINE CLINIC | Facility: CLINIC | Age: 63
End: 2018-01-10

## 2018-01-10 DIAGNOSIS — L03.119 CELLULITIS IN DIABETIC FOOT (HCC): Primary | ICD-10-CM

## 2018-01-10 DIAGNOSIS — Z89.512 S/P BKA (BELOW KNEE AMPUTATION) UNILATERAL, LEFT (HCC): ICD-10-CM

## 2018-01-10 DIAGNOSIS — E11.59 TYPE 2 DIABETES MELLITUS WITH OTHER CIRCULATORY COMPLICATION, WITHOUT LONG-TERM CURRENT USE OF INSULIN (HCC): ICD-10-CM

## 2018-01-10 DIAGNOSIS — E11.628 CELLULITIS IN DIABETIC FOOT (HCC): Primary | ICD-10-CM

## 2018-01-10 PROCEDURE — 99306 1ST NF CARE HIGH MDM 50: CPT | Performed by: FAMILY MEDICINE

## 2018-01-10 NOTE — TELEPHONE ENCOUNTER
Rec'd call from Tatum Corona RN at [facility]. She checked his BS at 12:10 prior to lunch, BS was 53. Glass of juice was given and BS was checked q15 for one hour.   Most current BS is 191  P't's usual order is 18u of Novolog in additio

## 2018-01-10 NOTE — DISCHARGE SUMMARY
[facility] Hospitalist Discharge Summary   Patient ID:  Justen Ortiz  [id number]  15 year old  9/4/1955    Admit date: 12/24/2017  Discharge date: 1/9/2018  Primary Care Physician: None Pcp   Attending Physician: No att. providers found   Consults:   Consultant inflammation, MRI obtained as pt with persisting pain-- confirms gluteus franky strain.  Pt also without leukocytosis, no other s/s infection  -no further w/u needed at this time     Post op hypoxia due to aspiration - improved  -initially on NRB post extu Discharge Instructions     Medication List      START taking these medications    Amoxicillin-Pot Clavulanate 875-125 MG Tabs  Commonly known as:  AUGMENTIN  Take 1 tablet by mouth every 12 (twelve) hours.      Clopidogrel Bisulfate 75 MG Tabs  Common BASAGLAR     MetFORMIN HCl 500 MG Tabs  Commonly known as:  GLUCOPHAGE     VICTOZA 18 MG/3ML Sopn  Generic drug:  Liraglutide     Vitamin C 500 MG Tabs  Commonly known as:  VITAMIN C        STOP taking these medications    cephALEXin 500 MG Caps  Commonly

## 2018-01-16 NOTE — PAYOR COMM NOTE
REF: [facility] 1/8/18-  REQUESTING ADDITIONAL DAYS     1/8/18    Assessment/Plan:   Mr. Dago Adam is a 59 yo M with PMH of DM2- insulin dependent, HTN, stroke who presented with worsening L foot pain and swelling after injury to his foot a hypoglycemia this am  - home regimen: basaglar 70 units daily, victoza 1.2 mg daily, metformin 500 mg daily - hold  - accuchecks QID, hypoglycemic protocol, SSI  - BS uncontrolled  - wife thinks last HgA1c 11, repeat A1c 10.0  - reduce insulin lantus to 80 dextrose 50%, Glucose-Vitamin C, ipratropium-albuterol, ondansetron HCl, HYDROcodone-acetaminophen, Normal Saline Flush, ondansetron HCl     Data Review:       Labs:            Recent Labs   Lab  01/06/18   0853  01/07/18   0538  01/08/18   0555   WBC  19.

## 2018-01-19 ENCOUNTER — OFFICE VISIT (OUTPATIENT)
Dept: INTERNAL MEDICINE CLINIC | Facility: CLINIC | Age: 63
End: 2018-01-19

## 2018-01-19 VITALS
OXYGEN SATURATION: 100 % | SYSTOLIC BLOOD PRESSURE: 110 MMHG | TEMPERATURE: 98 F | HEIGHT: 72 IN | BODY MASS INDEX: 26.68 KG/M2 | DIASTOLIC BLOOD PRESSURE: 60 MMHG | RESPIRATION RATE: 17 BRPM | HEART RATE: 88 BPM | WEIGHT: 197 LBS

## 2018-01-19 DIAGNOSIS — E13.9 DIABETES 1.5, MANAGED AS TYPE 2 (HCC): ICD-10-CM

## 2018-01-19 DIAGNOSIS — J69.0 ASPIRATION PNEUMONIA OF RIGHT LOWER LOBE DUE TO GASTRIC SECRETIONS (HCC): ICD-10-CM

## 2018-01-19 DIAGNOSIS — Z89.512 S/P BKA (BELOW KNEE AMPUTATION) UNILATERAL, LEFT (HCC): Primary | ICD-10-CM

## 2018-01-19 DIAGNOSIS — I73.9 PAD (PERIPHERAL ARTERY DISEASE) (HCC): ICD-10-CM

## 2018-01-19 PROCEDURE — 99204 OFFICE O/P NEW MOD 45 MIN: CPT | Performed by: INTERNAL MEDICINE

## 2018-01-19 RX ORDER — INSULIN ASPART 100 [IU]/ML
INJECTION, SOLUTION INTRAVENOUS; SUBCUTANEOUS
Refills: 98 | COMMUNITY
Start: 2018-01-09 | End: 2018-02-13

## 2018-01-19 RX ORDER — POLYETHYLENE GLYCOL 3350 17 G/17G
POWDER, FOR SOLUTION ORAL
Refills: 98 | Status: ON HOLD | COMMUNITY
Start: 2018-01-09 | End: 2018-06-27

## 2018-01-19 RX ORDER — GABAPENTIN 300 MG/1
CAPSULE ORAL
Qty: 60 CAPSULE | Refills: 4 | Status: SHIPPED | OUTPATIENT
Start: 2018-01-19 | End: 2018-04-19 | Stop reason: ALTCHOICE

## 2018-01-19 NOTE — PROGRESS NOTES
HPI:    Patient ID: Eliazar Springer is a 58year old male. HPI     Patient is her to establish care. Diabetes on Insulin    Mutiple medical problems : Type 2 dm , HTN, Stroke, with PAD. s/p L BKA.  Presented  On 12/27/2017 with injury of left foot, small Rfl: 0   Insulin Aspart Pen 100 UNIT/ML Subcutaneous Solution Pen-injector Give 1 unit for blood glucose 140-160 mg/dLGive 2 units for blood glucose 161-180 mg/dLGive 3 units for blood glucose 181-200 mg/dLGive 4 units for blood glucose 201-220 mg/dLGive 5 heart sounds. Pulmonary/Chest: Effort normal and breath sounds normal. No respiratory distress. Abdominal: Soft. Bowel sounds are normal. He exhibits no distension. Musculoskeletal:   S/p Left BKA> dressing in place. Staples removed by surgery.     Andrew Pierre

## 2018-02-12 PROBLEM — E11.59 TYPE 2 DIABETES MELLITUS WITH CIRCULATORY DISORDER (HCC): Status: ACTIVE | Noted: 2018-02-12

## 2018-02-13 ENCOUNTER — TELEPHONE (OUTPATIENT)
Dept: INTERNAL MEDICINE CLINIC | Facility: CLINIC | Age: 63
End: 2018-02-13

## 2018-02-13 NOTE — TELEPHONE ENCOUNTER
Left vm on nurse line, and also faxed a request for medical notes:  Emily Carroll needing notes/documents to get authorization as to why patient needs prosthesis and what patient plans to do after the prosthesis (walk, shop, work, etc).     Fax number: [phone]

## 2018-02-15 NOTE — TELEPHONE ENCOUNTER
Cecile Ba left another voicemail states she has call 4 times regarding medical notes that are needed for prosthetic leg.    Cecile Ba can be reached at [phone] states she also faxed us a form specifying what was needed

## 2018-02-24 ENCOUNTER — TELEPHONE (OUTPATIENT)
Dept: INTERNAL MEDICINE CLINIC | Facility: CLINIC | Age: 63
End: 2018-02-24

## 2018-04-11 ENCOUNTER — TELEPHONE (OUTPATIENT)
Dept: INTERNAL MEDICINE CLINIC | Facility: CLINIC | Age: 63
End: 2018-04-11

## 2018-04-19 ENCOUNTER — OFFICE VISIT (OUTPATIENT)
Dept: INTERNAL MEDICINE CLINIC | Facility: CLINIC | Age: 63
End: 2018-04-19

## 2018-04-19 VITALS
DIASTOLIC BLOOD PRESSURE: 60 MMHG | RESPIRATION RATE: 20 BRPM | HEART RATE: 91 BPM | OXYGEN SATURATION: 99 % | BODY MASS INDEX: 26.68 KG/M2 | HEIGHT: 72 IN | TEMPERATURE: 98 F | SYSTOLIC BLOOD PRESSURE: 122 MMHG | WEIGHT: 197 LBS

## 2018-04-19 DIAGNOSIS — Z23 NEED FOR VACCINATION AGAINST STREPTOCOCCUS PNEUMONIAE: ICD-10-CM

## 2018-04-19 DIAGNOSIS — Z89.512 S/P BKA (BELOW KNEE AMPUTATION) UNILATERAL, LEFT (HCC): ICD-10-CM

## 2018-04-19 DIAGNOSIS — Z79.4 TYPE 2 DIABETES MELLITUS WITH OTHER CIRCULATORY COMPLICATION, WITH LONG-TERM CURRENT USE OF INSULIN (HCC): Primary | ICD-10-CM

## 2018-04-19 DIAGNOSIS — E11.59 TYPE 2 DIABETES MELLITUS WITH OTHER CIRCULATORY COMPLICATION, WITH LONG-TERM CURRENT USE OF INSULIN (HCC): Primary | ICD-10-CM

## 2018-04-19 DIAGNOSIS — I73.9 PAD (PERIPHERAL ARTERY DISEASE) (HCC): ICD-10-CM

## 2018-04-19 DIAGNOSIS — Z12.5 PROSTATE CANCER SCREENING: ICD-10-CM

## 2018-04-19 DIAGNOSIS — I10 ESSENTIAL HYPERTENSION: ICD-10-CM

## 2018-04-19 DIAGNOSIS — Z12.11 COLON CANCER SCREENING: ICD-10-CM

## 2018-04-19 PROCEDURE — 90732 PPSV23 VACC 2 YRS+ SUBQ/IM: CPT | Performed by: INTERNAL MEDICINE

## 2018-04-19 PROCEDURE — 90471 IMMUNIZATION ADMIN: CPT | Performed by: INTERNAL MEDICINE

## 2018-04-19 PROCEDURE — 99214 OFFICE O/P EST MOD 30 MIN: CPT | Performed by: INTERNAL MEDICINE

## 2018-04-19 RX ORDER — METFORMIN HYDROCHLORIDE 500 MG/1
500 TABLET, EXTENDED RELEASE ORAL
Qty: 90 TABLET | Refills: 2 | Status: ON HOLD | OUTPATIENT
Start: 2018-04-19 | End: 2018-06-27

## 2018-04-19 RX ORDER — CALCIUM CITRATE/VITAMIN D3 200MG-6.25
TABLET ORAL
Qty: 300 STRIP | Refills: 4 | Status: ON HOLD | OUTPATIENT
Start: 2018-04-19 | End: 2018-06-27

## 2018-04-19 RX ORDER — BLOOD SUGAR DIAGNOSTIC
STRIP MISCELLANEOUS
Qty: 300 STRIP | Refills: 3 | Status: ON HOLD | OUTPATIENT
Start: 2018-04-19 | End: 2018-06-27

## 2018-04-19 RX ORDER — METFORMIN HYDROCHLORIDE 500 MG/1
TABLET, EXTENDED RELEASE ORAL
Refills: 2 | COMMUNITY
Start: 2018-03-11 | End: 2018-04-19

## 2018-04-19 NOTE — PROGRESS NOTES
HPI:    Patient ID: Radha Izquierdo is a 58year old male. HPI    Mutiple medical problems : Type 2 dm , HTN, Stroke, with PAD. s/p L BKA. Presented  On 12/27/2017 with injury of left foot, small blister. Started on Keflex initially.  Wound became worst. pen Rfl: 2   Liraglutide (VICTOZA) 18 MG/3ML Subcutaneous Solution Pen-injector Inject 1.2 mg into the skin every morning.  Disp: 3 pen Rfl: 4   MetFORMIN HCl  MG Oral Tablet 24 Hr Take 1 tablet (500 mg total) by mouth daily with breakfast. Disp: 90 t Left BKA with  Prosthesis. R foot dorsalis pedis palpable. Neurological: He is alert and oriented to person, place, and time. Psychiatric: Judgment normal.   Vitals reviewed.              ASSESMENT AND PLAN    Type 2 diabetes mellitus with other circ Check glucose 3 x daily. Use as directed.       Glucose Blood (TRUE METRIX BLOOD GLUCOSE TEST) In Vitro Strip 300 strip 4      Sig: Check glucose 3 x daily           Imaging & Referrals:  PNEUMOCOCCAL IMM (PNEUMOVAX)  GASTRO - INTERNAL  OPHTHALMOLOGY - EXTE

## 2018-04-20 ENCOUNTER — NURSE ONLY (OUTPATIENT)
Dept: INTERNAL MEDICINE CLINIC | Facility: CLINIC | Age: 63
End: 2018-04-20

## 2018-04-20 DIAGNOSIS — E11.59 TYPE 2 DIABETES MELLITUS WITH OTHER CIRCULATORY COMPLICATION, WITH LONG-TERM CURRENT USE OF INSULIN (HCC): ICD-10-CM

## 2018-04-20 DIAGNOSIS — Z79.4 TYPE 2 DIABETES MELLITUS WITH OTHER CIRCULATORY COMPLICATION, WITH LONG-TERM CURRENT USE OF INSULIN (HCC): ICD-10-CM

## 2018-04-20 DIAGNOSIS — Z12.5 PROSTATE CANCER SCREENING: ICD-10-CM

## 2018-04-20 PROCEDURE — 83036 HEMOGLOBIN GLYCOSYLATED A1C: CPT | Performed by: INTERNAL MEDICINE

## 2018-04-20 PROCEDURE — 85025 COMPLETE CBC W/AUTO DIFF WBC: CPT | Performed by: INTERNAL MEDICINE

## 2018-04-20 PROCEDURE — G0103 PSA SCREENING: HCPCS | Performed by: INTERNAL MEDICINE

## 2018-04-20 PROCEDURE — 80061 LIPID PANEL: CPT | Performed by: INTERNAL MEDICINE

## 2018-04-20 PROCEDURE — 36415 COLL VENOUS BLD VENIPUNCTURE: CPT | Performed by: INTERNAL MEDICINE

## 2018-04-20 PROCEDURE — 80053 COMPREHEN METABOLIC PANEL: CPT | Performed by: INTERNAL MEDICINE

## 2018-04-20 NOTE — PROGRESS NOTES
Patient presented in office today for fasting blood draw. Blood draw successful in right arm  Jarocho:  Cbc,cmp,lipid,A1C,PSA  D. O.B verified   Orders per Doctor Co

## 2018-04-23 ENCOUNTER — NURSE ONLY (OUTPATIENT)
Dept: INTERNAL MEDICINE CLINIC | Facility: CLINIC | Age: 63
End: 2018-04-23

## 2018-04-23 ENCOUNTER — TELEPHONE (OUTPATIENT)
Dept: INTERNAL MEDICINE CLINIC | Facility: CLINIC | Age: 63
End: 2018-04-23

## 2018-04-23 DIAGNOSIS — Z12.11 SCREEN FOR COLON CANCER: Primary | ICD-10-CM

## 2018-04-23 DIAGNOSIS — Z79.4 TYPE 2 DIABETES MELLITUS WITH OTHER CIRCULATORY COMPLICATION, WITH LONG-TERM CURRENT USE OF INSULIN (HCC): ICD-10-CM

## 2018-04-23 DIAGNOSIS — E11.59 TYPE 2 DIABETES MELLITUS WITH OTHER CIRCULATORY COMPLICATION, WITH LONG-TERM CURRENT USE OF INSULIN (HCC): ICD-10-CM

## 2018-04-23 PROCEDURE — 82043 UR ALBUMIN QUANTITATIVE: CPT | Performed by: INTERNAL MEDICINE

## 2018-04-23 PROCEDURE — 36415 COLL VENOUS BLD VENIPUNCTURE: CPT | Performed by: INTERNAL MEDICINE

## 2018-04-23 PROCEDURE — 82274 ASSAY TEST FOR BLOOD FECAL: CPT | Performed by: INTERNAL MEDICINE

## 2018-04-23 PROCEDURE — 82570 ASSAY OF URINE CREATININE: CPT | Performed by: INTERNAL MEDICINE

## 2018-04-23 NOTE — TELEPHONE ENCOUNTER
Per Dr Trudy Severin notes last visit and refill on 4/19/2018 Metformin 500mg 1 tablet daily, please confirm with Dr Nikki Gregory

## 2018-06-26 ENCOUNTER — APPOINTMENT (OUTPATIENT)
Dept: CT IMAGING | Facility: HOSPITAL | Age: 63
End: 2018-06-26
Attending: EMERGENCY MEDICINE

## 2018-06-26 ENCOUNTER — HOSPITAL ENCOUNTER (OUTPATIENT)
Facility: HOSPITAL | Age: 63
Setting detail: OBSERVATION
Discharge: HOME | End: 2018-06-27
Attending: EMERGENCY MEDICINE | Admitting: INTERNAL MEDICINE

## 2018-06-26 DIAGNOSIS — R55 SYNCOPE AND COLLAPSE: Primary | ICD-10-CM

## 2018-06-26 PROCEDURE — 70450 CT HEAD/BRAIN W/O DYE: CPT | Performed by: EMERGENCY MEDICINE

## 2018-06-26 RX ORDER — ONDANSETRON 2 MG/ML
4 INJECTION INTRAMUSCULAR; INTRAVENOUS ONCE
Status: COMPLETED | OUTPATIENT
Start: 2018-06-26 | End: 2018-06-26

## 2018-06-27 ENCOUNTER — APPOINTMENT (OUTPATIENT)
Dept: ULTRASOUND IMAGING | Facility: HOSPITAL | Age: 63
End: 2018-06-27
Attending: INTERNAL MEDICINE

## 2018-06-27 ENCOUNTER — APPOINTMENT (OUTPATIENT)
Dept: MRI IMAGING | Facility: HOSPITAL | Age: 63
End: 2018-06-27
Attending: INTERNAL MEDICINE

## 2018-06-27 ENCOUNTER — APPOINTMENT (OUTPATIENT)
Dept: CV DIAGNOSTICS | Facility: HOSPITAL | Age: 63
End: 2018-06-27
Attending: INTERNAL MEDICINE

## 2018-06-27 VITALS
WEIGHT: 197.69 LBS | TEMPERATURE: 99 F | SYSTOLIC BLOOD PRESSURE: 123 MMHG | DIASTOLIC BLOOD PRESSURE: 81 MMHG | OXYGEN SATURATION: 95 % | BODY MASS INDEX: 26.78 KG/M2 | HEART RATE: 83 BPM | HEIGHT: 72 IN | RESPIRATION RATE: 18 BRPM

## 2018-06-27 DIAGNOSIS — I45.9 STOKES-ADAMS SYNCOPE: ICD-10-CM

## 2018-06-27 DIAGNOSIS — I65.03 STENOSIS OF BOTH VERTEBRAL ARTERIES: Primary | ICD-10-CM

## 2018-06-27 PROBLEM — E11.9 DIABETES MELLITUS (HCC): Status: ACTIVE | Noted: 2018-02-12

## 2018-06-27 PROCEDURE — 93306 TTE W/DOPPLER COMPLETE: CPT | Performed by: INTERNAL MEDICINE

## 2018-06-27 PROCEDURE — 99219 INITIAL OBSERVATION CARE,LEVL II: CPT | Performed by: INTERNAL MEDICINE

## 2018-06-27 PROCEDURE — 93880 EXTRACRANIAL BILAT STUDY: CPT | Performed by: INTERNAL MEDICINE

## 2018-06-27 PROCEDURE — 70553 MRI BRAIN STEM W/O & W/DYE: CPT | Performed by: INTERNAL MEDICINE

## 2018-06-27 RX ORDER — LOSARTAN POTASSIUM 50 MG/1
50 TABLET ORAL DAILY
Status: DISCONTINUED | OUTPATIENT
Start: 2018-06-27 | End: 2018-06-27

## 2018-06-27 RX ORDER — BLOOD SUGAR DIAGNOSTIC
STRIP MISCELLANEOUS
Qty: 300 STRIP | Refills: 3 | Status: SHIPPED | OUTPATIENT
Start: 2018-06-27 | End: 2019-01-01

## 2018-06-27 RX ORDER — DEXTROSE MONOHYDRATE 25 G/50ML
50 INJECTION, SOLUTION INTRAVENOUS AS NEEDED
Status: DISCONTINUED | OUTPATIENT
Start: 2018-06-27 | End: 2018-06-27

## 2018-06-27 RX ORDER — HEPARIN SODIUM 5000 [USP'U]/ML
5000 INJECTION, SOLUTION INTRAVENOUS; SUBCUTANEOUS EVERY 12 HOURS SCHEDULED
Status: DISCONTINUED | OUTPATIENT
Start: 2018-06-27 | End: 2018-06-27

## 2018-06-27 RX ORDER — METFORMIN HYDROCHLORIDE 1000 MG/1
1000 TABLET, FILM COATED, EXTENDED RELEASE ORAL
Qty: 90 TABLET | Refills: 1 | Status: SHIPPED | OUTPATIENT
Start: 2018-06-27 | End: 2018-06-28

## 2018-06-27 RX ORDER — ASPIRIN 81 MG/1
81 TABLET, CHEWABLE ORAL DAILY
Status: DISCONTINUED | OUTPATIENT
Start: 2018-06-27 | End: 2018-06-27

## 2018-06-27 RX ORDER — ASCORBIC ACID 500 MG
500 TABLET ORAL DAILY
Status: DISCONTINUED | OUTPATIENT
Start: 2018-06-27 | End: 2018-06-27

## 2018-06-27 RX ORDER — VITS A,C,E/LUTEIN/MINERALS 300MCG-200
1 TABLET ORAL DAILY
Status: DISCONTINUED | OUTPATIENT
Start: 2018-06-27 | End: 2018-06-27

## 2018-06-27 RX ORDER — LOSARTAN POTASSIUM 50 MG/1
50 TABLET ORAL DAILY
Qty: 90 TABLET | Refills: 0 | Status: ON HOLD | OUTPATIENT
Start: 2018-06-27 | End: 2018-07-30

## 2018-06-27 RX ORDER — ATORVASTATIN CALCIUM 40 MG/1
40 TABLET, FILM COATED ORAL NIGHTLY
Qty: 90 TABLET | Refills: 1 | Status: ON HOLD | OUTPATIENT
Start: 2018-06-27 | End: 2018-07-30

## 2018-06-27 NOTE — PROGRESS NOTES
EYE VITAMINS CAPS 1 capsule is Non-Formulary Medication &  Auto-Substituted to Ocuvite tablet  Per P&T PROTOCOL

## 2018-06-27 NOTE — ED NOTES
Assumed care of patient from [place] [id number]. Dr. Coughlin at bedside speaking with patient. Will continue to monitor.

## 2018-06-27 NOTE — CONSULTS
Patient is a 58year old male who was admitted to the hospital for Syncope and collapse: This patient had a syncopal episode at home while he was outside doing some household activities. He started to feel dizzy and lightheaded and he sat down on a step. SUPPLY 1.2 mg Subcutaneous Nightly   Heparin Sodium (Porcine) 5000 UNIT/ML injection 5,000 Units 5,000 Units Subcutaneous 2 times per day   Insulin Aspart Pen (NOVOLOG) 100 UNIT/ML flexpen 1-7 Units 1-7 Units Subcutaneous TID CC   dextrose 50 % injection 5 Exam:    General: No acute distress. HEENT: No scleral icterus. No xanthelasma. Neck: No JVD, carotids 2+, L bruit. Cardiac: Regular rate and rhythm. S1, S2 normal.  Harsh systolic murmur at the aortic area.   Lungs: Clear without rhales, rhochi or whee emergency department. Discussed with Dr. Stephon Schuler also about blood pressure management which would include low-dose ARB but not high dose vasodilator. May use beta-blocker if needed. Add statin treatment. Thank you very much for the consultation.  Please do n

## 2018-06-27 NOTE — PLAN OF CARE
Problem: Patient Centered Care  Goal: Patient preferences are identified and integrated in the patient's plan of care  Interventions:  - What would you like us to know as we care for you? I live at home my wife and son. \"I love soccer.  I hope [place] medications to optimize hemodynamic stability  - Monitor arterial and/or venous puncture sites for bleeding and/or hematoma  - Assess quality of pulses, skin color and temperature  - Assess for signs of decreased coronary artery perfusion - ex.  Angina  - E

## 2018-06-27 NOTE — ED PROVIDER NOTES
Patient Seen in: [facility] AND [facility] Emergency Department    History   Patient presents with:  Syncope (cardiovascular, neurologic)    Stated Complaint: Syncope w/Vomiting    HPI    79-year-old male presents for evaluation after syncopal episode.   Patient Neck: Normal range of motion. Neck supple. Cardiovascular: Normal rate, regular rhythm and intact distal pulses. Murmur heard. Pulmonary/Chest: Effort normal and breath sounds normal. No respiratory distress. Abdominal: Soft.  Bowel sounds are nor ---------                               -----------         ------                     CBC W/ DIFFERENTIAL[[id number]]          Abnormal            Final result                 Please view results for these tests on the individual orders.    TYREE RATLIFF

## 2018-06-27 NOTE — ED INITIAL ASSESSMENT (HPI)
Pt passed out earlier while outside, felt sweaty thwhile sitting on porch, wife saide became pale and passed out.  Alert on arrive

## 2018-06-27 NOTE — PLAN OF CARE
CARDIOVASCULAR - ADULT    • Maintains optimal cardiac output and hemodynamic stability Completed    • Absence of cardiac arrhythmias or at baseline Completed        Diabetes/Glucose Control    • Glucose maintained within prescribed range Completed        N

## 2018-06-28 ENCOUNTER — PATIENT OUTREACH (OUTPATIENT)
Dept: CASE MANAGEMENT | Age: 63
End: 2018-06-28

## 2018-06-28 RX ORDER — METFORMIN HYDROCHLORIDE 500 MG/1
500 TABLET, EXTENDED RELEASE ORAL 2 TIMES DAILY WITH MEALS
Qty: 180 TABLET | Refills: 1 | Status: SHIPPED | OUTPATIENT
Start: 2018-06-28 | End: 2019-01-01

## 2018-06-28 NOTE — PROGRESS NOTES
Condition Update Post Discharge   Discharge Date: 6/27/18  Contact Date: 6/28/2018    Consent Verification:  Assessment Completed With: Patient  HIPAA Verified? Yes    General:   • How have you been since your discharge from the hospital/facility?   Pt s Subcutaneous Solution Pen-injector Inject 70 units subcutaneously every night at bed time Disp: 10 pen Rfl: 2   Liraglutide (VICTOZA) 18 MG/3ML Subcutaneous Solution Pen-injector Inject 1.2 mg into the skin every morning.  (Patient taking differently: [id number]  [phone]          PCP TCM/HFU appointment: scheduled at D/C within 7-14 days  no     NCM Reviewed/scheduled/rescheduled PCP TCM/HFU appointment with pt:  Yes      Have you made all of your follow up appointments? no--pt to

## 2018-06-28 NOTE — H&P
[facility]    PATIENT'S NAME: Froy Lightning   ATTENDING PHYSICIAN: Saima Prabhakar MD   PATIENT ACCOUNT#:   [id number]    LOCATION:  [address] [place], [place] [address]Stefanie RECORD #:   [id number]       YOB: 1955  ADMISSION DATE:       06/26/2018 Normoactive bowel sounds. EXTREMITIES:  Status post left BKA. No pedal edema. No calf tenderness. NEUROLOGIC:  Cranial nerves II-XII intact. No motor or sensory deficit.       LABORATORY DATA:  Glucose 232, sodium 136, potassium 4.2, chloride 103, CO2 Hyperlipidemia:  LDL goal is less than 70.  7.   Paranasal sinus disease:  Patient has no symptoms. Continue to follow. Condition and plans discussed at length with the patient and family ( wife and son). Mr. Walter De La Cruz is well aware of  high CV risk .

## 2018-07-02 ENCOUNTER — OFFICE VISIT (OUTPATIENT)
Dept: INTERNAL MEDICINE CLINIC | Facility: CLINIC | Age: 63
End: 2018-07-02

## 2018-07-02 VITALS
SYSTOLIC BLOOD PRESSURE: 122 MMHG | BODY MASS INDEX: 26.68 KG/M2 | WEIGHT: 197 LBS | HEIGHT: 72 IN | HEART RATE: 71 BPM | RESPIRATION RATE: 21 BRPM | TEMPERATURE: 98 F | DIASTOLIC BLOOD PRESSURE: 62 MMHG | OXYGEN SATURATION: 99 %

## 2018-07-02 DIAGNOSIS — I65.03 STENOSIS OF BOTH VERTEBRAL ARTERIES: ICD-10-CM

## 2018-07-02 DIAGNOSIS — Z79.4 TYPE 2 DIABETES MELLITUS WITH OTHER CIRCULATORY COMPLICATION, WITH LONG-TERM CURRENT USE OF INSULIN (HCC): ICD-10-CM

## 2018-07-02 DIAGNOSIS — I45.9 STOKES-ADAMS SYNCOPE: Primary | ICD-10-CM

## 2018-07-02 DIAGNOSIS — I35.0 NONRHEUMATIC AORTIC VALVE STENOSIS: ICD-10-CM

## 2018-07-02 DIAGNOSIS — E11.59 TYPE 2 DIABETES MELLITUS WITH OTHER CIRCULATORY COMPLICATION, WITH LONG-TERM CURRENT USE OF INSULIN (HCC): ICD-10-CM

## 2018-07-02 PROCEDURE — 99496 TRANSJ CARE MGMT HIGH F2F 7D: CPT | Performed by: INTERNAL MEDICINE

## 2018-07-02 NOTE — PROGRESS NOTES
HPI:    Steve Castro is a 58year old male here today for hospital follow up.    He was discharged from Inpatient hospital, [facility] AND [facility]  to [address] [place] [address] Date: 6/26/18  Discharge Date: 6/27/18  Hospital Discharge Diagnosis:     Syncope  Severe aort cath was planned. Possible aortic valve replacement was explained pending work up. He was started on ASA and atorvastatin  40 mgs QD. He continues Basaglar  At night nw tapared to 50 units at night. Am glucose since discharged to home was around 90s. He t Dementia in his mother; Diabetes in his mother and paternal grandfather; Other in his mother. He  reports that he has never smoked. He has never used smokeless tobacco. He reports that he does not drink alcohol or use drugs.      ROS:   GENERAL: weight st stenosis, high grafe vertebral stenosis and possible hypoglycemia    SEVERE AORTIC STENOSIS  F/u with Dr. Debra Marley for cardiac cath. May need aortic valve replacement pending w/u.     HIGH GRADE VERTEBRAL STENOSIS  To do Brain MRA   Continue ASA 81 mgs

## 2018-07-20 ENCOUNTER — HOSPITAL ENCOUNTER (INPATIENT)
Facility: HOSPITAL | Age: 63
LOS: 3 days | Discharge: ANOTHER HOSPITAL | DRG: 065 | End: 2018-07-24
Admitting: HOSPITALIST

## 2018-07-20 ENCOUNTER — APPOINTMENT (OUTPATIENT)
Dept: CT IMAGING | Facility: HOSPITAL | Age: 63
DRG: 065 | End: 2018-07-20

## 2018-07-20 DIAGNOSIS — I63.9 ACUTE CVA (CEREBROVASCULAR ACCIDENT) (HCC): Primary | ICD-10-CM

## 2018-07-20 LAB — GLUCOSE BLDC GLUCOMTR-MCNC: 227 MG/DL (ref 70–99)

## 2018-07-20 PROCEDURE — 70450 CT HEAD/BRAIN W/O DYE: CPT

## 2018-07-21 ENCOUNTER — HOSPITAL ENCOUNTER (INPATIENT)
Dept: MRI IMAGING | Facility: HOSPITAL | Age: 63
Discharge: HOME | DRG: 065 | End: 2018-07-21
Attending: INTERNAL MEDICINE | Admitting: HOSPITALIST

## 2018-07-21 ENCOUNTER — APPOINTMENT (OUTPATIENT)
Dept: MRI IMAGING | Facility: HOSPITAL | Age: 63
DRG: 065 | End: 2018-07-21
Attending: HOSPITALIST

## 2018-07-21 DIAGNOSIS — I65.03 STENOSIS OF BOTH VERTEBRAL ARTERIES: ICD-10-CM

## 2018-07-21 DIAGNOSIS — I45.9 STOKES-ADAMS SYNCOPE: ICD-10-CM

## 2018-07-21 PROBLEM — R73.9 HYPERGLYCEMIA: Status: ACTIVE | Noted: 2018-07-21

## 2018-07-21 PROBLEM — I63.9 ACUTE CVA (CEREBROVASCULAR ACCIDENT) (HCC): Status: ACTIVE | Noted: 2018-07-21

## 2018-07-21 LAB
ALBUMIN SERPL BCP-MCNC: 3.3 G/DL (ref 3.5–4.8)
ALBUMIN/GLOB SERPL: 0.9 {RATIO} (ref 1–2)
ALP SERPL-CCNC: 57 U/L (ref 32–100)
ALT SERPL-CCNC: 19 U/L (ref 17–63)
ANION GAP SERPL CALC-SCNC: 6 MMOL/L (ref 0–18)
ANTIBODY SCREEN: NEGATIVE
APTT PPP: 34.2 SECONDS (ref 23.2–35.3)
AST SERPL-CCNC: 22 U/L (ref 15–41)
BASOPHILS # BLD: 0 K/UL (ref 0–0.2)
BASOPHILS NFR BLD: 1 %
BILIRUB SERPL-MCNC: 0.5 MG/DL (ref 0.3–1.2)
BUN SERPL-MCNC: 16 MG/DL (ref 8–20)
BUN/CREAT SERPL: 13 (ref 10–20)
CALCIUM SERPL-MCNC: 8.7 MG/DL (ref 8.5–10.5)
CHLORIDE SERPL-SCNC: 102 MMOL/L (ref 95–110)
CHOLEST SERPL-MCNC: 106 MG/DL (ref 110–200)
CO2 SERPL-SCNC: 26 MMOL/L (ref 22–32)
CREAT SERPL-MCNC: 1.23 MG/DL (ref 0.5–1.5)
EOSINOPHIL # BLD: 0.2 K/UL (ref 0–0.7)
EOSINOPHIL NFR BLD: 2 %
ERYTHROCYTE [DISTWIDTH] IN BLOOD BY AUTOMATED COUNT: 14.9 % (ref 11–15)
GLOBULIN PLAS-MCNC: 3.7 G/DL (ref 2.5–3.7)
GLUCOSE BLDC GLUCOMTR-MCNC: 165 MG/DL (ref 70–99)
GLUCOSE BLDC GLUCOMTR-MCNC: 172 MG/DL (ref 70–99)
GLUCOSE BLDC GLUCOMTR-MCNC: 183 MG/DL (ref 70–99)
GLUCOSE BLDC GLUCOMTR-MCNC: 202 MG/DL (ref 70–99)
GLUCOSE BLDC GLUCOMTR-MCNC: 302 MG/DL (ref 70–99)
GLUCOSE SERPL-MCNC: 238 MG/DL (ref 70–99)
HBA1C MFR BLD: 7 % (ref 4–6)
HCT VFR BLD AUTO: 38.1 % (ref 41–52)
HDLC SERPL-MCNC: 26 MG/DL
HGB BLD-MCNC: 12.7 G/DL (ref 13.5–17.5)
INR BLD: 1.2 (ref 0.9–1.2)
LDLC SERPL CALC-MCNC: 60 MG/DL (ref 0–99)
LYMPHOCYTES # BLD: 2 K/UL (ref 1–4)
LYMPHOCYTES NFR BLD: 23 %
MCH RBC QN AUTO: 26.8 PG (ref 27–32)
MCHC RBC AUTO-ENTMCNC: 33.2 G/DL (ref 32–37)
MCV RBC AUTO: 80.7 FL (ref 80–100)
MONOCYTES # BLD: 0.7 K/UL (ref 0–1)
MONOCYTES NFR BLD: 9 %
NEUTROPHILS # BLD AUTO: 5.8 K/UL (ref 1.8–7.7)
NEUTROPHILS NFR BLD: 66 %
NONHDLC SERPL-MCNC: 80 MG/DL
OSMOLALITY UR CALC.SUM OF ELEC: 287 MOSM/KG (ref 275–295)
PLATELET # BLD AUTO: 100 K/UL (ref 140–400)
PMV BLD AUTO: 12 FL (ref 7.4–10.3)
POTASSIUM SERPL-SCNC: 4.2 MMOL/L (ref 3.3–5.1)
PROT SERPL-MCNC: 7 G/DL (ref 5.9–8.4)
PROTHROMBIN TIME: 14.9 SECONDS (ref 11.8–14.5)
RBC # BLD AUTO: 4.72 M/UL (ref 4.5–5.9)
RH BLOOD TYPE: POSITIVE
SODIUM SERPL-SCNC: 134 MMOL/L (ref 136–144)
TRIGL SERPL-MCNC: 100 MG/DL (ref 1–149)
WBC # BLD AUTO: 8.8 K/UL (ref 4–11)

## 2018-07-21 PROCEDURE — 70549 MR ANGIOGRAPH NECK W/O&W/DYE: CPT | Performed by: HOSPITALIST

## 2018-07-21 PROCEDURE — 70551 MRI BRAIN STEM W/O DYE: CPT | Performed by: HOSPITALIST

## 2018-07-21 PROCEDURE — 99223 1ST HOSP IP/OBS HIGH 75: CPT | Performed by: HOSPITALIST

## 2018-07-21 PROCEDURE — 70544 MR ANGIOGRAPHY HEAD W/O DYE: CPT | Performed by: HOSPITALIST

## 2018-07-21 RX ORDER — DEXTROSE MONOHYDRATE 25 G/50ML
50 INJECTION, SOLUTION INTRAVENOUS AS NEEDED
Status: DISCONTINUED | OUTPATIENT
Start: 2018-07-21 | End: 2018-07-24

## 2018-07-21 RX ORDER — ATORVASTATIN CALCIUM 40 MG/1
40 TABLET, FILM COATED ORAL NIGHTLY
Status: DISCONTINUED | OUTPATIENT
Start: 2018-07-21 | End: 2018-07-23

## 2018-07-21 RX ORDER — CLOPIDOGREL BISULFATE 75 MG/1
75 TABLET ORAL DAILY
Status: DISCONTINUED | OUTPATIENT
Start: 2018-07-21 | End: 2018-07-23

## 2018-07-21 RX ORDER — ASPIRIN 81 MG/1
81 TABLET ORAL DAILY
Status: DISCONTINUED | OUTPATIENT
Start: 2018-07-21 | End: 2018-07-22

## 2018-07-21 RX ORDER — ENOXAPARIN SODIUM 100 MG/ML
40 INJECTION SUBCUTANEOUS DAILY
Status: DISCONTINUED | OUTPATIENT
Start: 2018-07-21 | End: 2018-07-21

## 2018-07-21 RX ORDER — BACLOFEN 10 MG/1
20 TABLET ORAL 3 TIMES DAILY PRN
Status: DISCONTINUED | OUTPATIENT
Start: 2018-07-21 | End: 2018-07-24

## 2018-07-21 RX ORDER — 0.9 % SODIUM CHLORIDE 0.9 %
VIAL (ML) INJECTION
Status: COMPLETED
Start: 2018-07-21 | End: 2018-07-21

## 2018-07-21 RX ORDER — ACETAMINOPHEN 325 MG/1
650 TABLET ORAL EVERY 6 HOURS PRN
Status: DISCONTINUED | OUTPATIENT
Start: 2018-07-21 | End: 2018-07-24

## 2018-07-21 RX ORDER — VITS A,C,E/LUTEIN/MINERALS 300MCG-200
1 TABLET ORAL DAILY
Status: DISCONTINUED | OUTPATIENT
Start: 2018-07-21 | End: 2018-07-24

## 2018-07-21 RX ORDER — ONDANSETRON 2 MG/ML
4 INJECTION INTRAMUSCULAR; INTRAVENOUS EVERY 6 HOURS PRN
Status: DISCONTINUED | OUTPATIENT
Start: 2018-07-21 | End: 2018-07-24

## 2018-07-21 RX ORDER — LOSARTAN POTASSIUM 50 MG/1
50 TABLET ORAL DAILY
Status: DISCONTINUED | OUTPATIENT
Start: 2018-07-21 | End: 2018-07-24

## 2018-07-21 NOTE — ED NOTES
Care assumed from triage. Patient presents with c/o R-sided weakness that began approximately 1900 today. HX CVA. Pt reports that his RUE and RLE feels like \"pins and needles\". Alert and oriented x 4. MD at bedside.

## 2018-07-21 NOTE — ED PROVIDER NOTES
Patient Seen in: [facility] Emergency Department    History   Patient presents with:  Stroke (neurologic)    Stated Complaint: Right sided numbness     HPI    41-year-old male with history of stroke 8-10 years ago some mild residual dysarthria as Cardiovascular: Normal rate, regular rhythm and intact distal pulses. Pulmonary/Chest: Effort normal. No respiratory distress. Abdominal: Soft. There is no tenderness. There is no guarding. Musculoskeletal: Normal range of motion.  No edema or tend DIFFERENTIAL[[id number]]          Abnormal            Final result                 Please view results for these tests on the individual orders. TYPE AND SCREEN    Narrative: The following orders were created for panel order TYPE AND SCREEN.   Procedur in the emergency department but was still slightly present. He was given aspirin at home per significant other. He will be admitted with neurology consultation and Dr. Zenaida Nissen on admission.         Disposition and Plan     Clinical Impression:  Acute CVA (ce

## 2018-07-21 NOTE — PHYSICAL THERAPY NOTE
PHYSICAL THERAPY EVALUATION - INPATIENT     Room Number: 327/327-A  Evaluation Date: 7/21/2018  Type of Evaluation: Initial   Physician Order: PT Eval and Treat    Presenting Problem: new onset slurred speech, RUE/RLE weakness/incoordination  Reason for T day rehab pending progress. He is up in chair with all needs in reach and family at bedside. Patient will benefit from continued IP PT services to address these deficits in preparation for discharge.     DISCHARGE RECOMMENDATIONS  PT Discharge Recommend ASSESSMENT  Ratin          COGNITION  · Overall Cognitive Status:  WFL - within functional limits  · overestimates abilities    RANGE OF MOTION AND STRENGTH ASSESSMENT  Upper extremity ROM and strength are within functional limits     Lower extremity R None;Rolling walker  Pattern: R Decreased stance time;R Steppage; Ataxic  Stoop/Curb Assistance: Not tested  Comment : Patient initially ambulates without AD and has wide CONNIE, very ataxic and bumping into objects on right.  With RW he demonstrates normalized

## 2018-07-21 NOTE — PLAN OF CARE
Diabetes/Glucose Control    • Glucose maintained within prescribed range Progressing        MUSCULOSKELETAL - ADULT    • Return mobility to safest level of function Progressing        NEUROLOGICAL - ADULT    • Achieves stable or improved neurological statu

## 2018-07-21 NOTE — H&P
[facility] Patient Status:  Emergency    1955 MRN [id number]   Location [address] Attending Ishan Friend MD   Hosp Day # 0 PCP Porsche Quiroz MD     Date:   Subcutaneous Solution Pen-injector Taking  No Yes   Sig: Inject 1.2 mg into the skin every morning. Patient taking differently: Inject 1.2 mg into the skin nightly.      Losartan Potassium 50 MG Oral Tab Taking  No Yes   Sig: Take 1 tablet (50 mg total) b lymphadenopathy, no thyromegaly. Respiratory:  Lungs are clear to auscultation, respirations are non-labored, breath sounds are equal, symmetrical chest wall expansion. Cardiovascular:  Normal rate, regular rhythm, grade 3 systolic murmur, no edema.   Gas 0.78cm^2. 3. Mitral valve: Mildly calcified annulus. Normal thickness    leaflets. . The leaflets were normal thickness. Compared to the previous study these findings represent indicate worsening.       Assessment and Plan:    Acute CVA  Patient outside of

## 2018-07-21 NOTE — PROGRESS NOTES
This is a follow-up from an admission from earlier today. Patient still has significant slurred speech, right upper extremity weakness. No worsening of his symptoms. He had an echocardiogram a month ago after a syncopal episode.   Will need to repeat Luz Cast

## 2018-07-21 NOTE — OCCUPATIONAL THERAPY NOTE
OCCUPATIONAL THERAPY EVALUATION - INPATIENT     Room Number: 327/327-A  Evaluation Date: 7/21/2018  Type of Evaluation: Initial  Presenting Problem:  (slurred speech, right side weakness )    Physician Order: IP Consult to Occupational Therapy  Reason for for a comprehensive approach to OT/PT/speech to maximize potential in these deficits and address balance and stability in home environment and continued use or RW at home - discussed findings with RN -     [id number] [address]  OT Discharge Recommenda TOLERANCE  18/87  HR 87  96% RN    COGNITION  Slightly impulsive, decreased awareness   Alert and O x 3    VISION  Right eye skewed, can come to midline - has cataract   Right eye acuity impaired  Smooth pursuits intact     PERCEPTION  Right Attention:   i session/findings; All patient questions and concerns addressed; Alarm set; Family present    OT Goals  Patients self stated goal is: go home      Patient will complete functional transfer with MOD I   Comment:     Patient will complete toileting with MOD I

## 2018-07-21 NOTE — ED NOTES
Patient accompanied to inpatient unit with transport. Pt agreeable with plan of care. Hemodynamically stable, alert and interacting appropriately.

## 2018-07-22 ENCOUNTER — APPOINTMENT (OUTPATIENT)
Dept: CT IMAGING | Facility: HOSPITAL | Age: 63
DRG: 065 | End: 2018-07-22
Attending: Other

## 2018-07-22 LAB
ANION GAP SERPL CALC-SCNC: 10 MMOL/L (ref 0–18)
APTT PPP: 33.8 SECONDS (ref 23.2–35.3)
BUN SERPL-MCNC: 22 MG/DL (ref 8–20)
BUN/CREAT SERPL: 18.2 (ref 10–20)
CALCIUM SERPL-MCNC: 9.1 MG/DL (ref 8.5–10.5)
CHLORIDE SERPL-SCNC: 98 MMOL/L (ref 95–110)
CO2 SERPL-SCNC: 25 MMOL/L (ref 22–32)
CREAT SERPL-MCNC: 1.21 MG/DL (ref 0.5–1.5)
ERYTHROCYTE [DISTWIDTH] IN BLOOD BY AUTOMATED COUNT: 15 % (ref 11–15)
GLUCOSE BLDC GLUCOMTR-MCNC: 108 MG/DL (ref 70–99)
GLUCOSE BLDC GLUCOMTR-MCNC: 163 MG/DL (ref 70–99)
GLUCOSE BLDC GLUCOMTR-MCNC: 207 MG/DL (ref 70–99)
GLUCOSE BLDC GLUCOMTR-MCNC: 293 MG/DL (ref 70–99)
GLUCOSE SERPL-MCNC: 332 MG/DL (ref 70–99)
HCT VFR BLD AUTO: 38.8 % (ref 41–52)
HGB BLD-MCNC: 13 G/DL (ref 13.5–17.5)
MCH RBC QN AUTO: 27.3 PG (ref 27–32)
MCHC RBC AUTO-ENTMCNC: 33.5 G/DL (ref 32–37)
MCV RBC AUTO: 81.4 FL (ref 80–100)
OSMOLALITY UR CALC.SUM OF ELEC: 292 MOSM/KG (ref 275–295)
PLATELET # BLD AUTO: 108 K/UL (ref 140–400)
PMV BLD AUTO: 11.8 FL (ref 7.4–10.3)
POTASSIUM SERPL-SCNC: 4.5 MMOL/L (ref 3.3–5.1)
RBC # BLD AUTO: 4.76 M/UL (ref 4.5–5.9)
SODIUM SERPL-SCNC: 133 MMOL/L (ref 136–144)
WBC # BLD AUTO: 9.3 K/UL (ref 4–11)

## 2018-07-22 PROCEDURE — 99255 IP/OBS CONSLTJ NEW/EST HI 80: CPT | Performed by: OTHER

## 2018-07-22 PROCEDURE — 99233 SBSQ HOSP IP/OBS HIGH 50: CPT | Performed by: HOSPITALIST

## 2018-07-22 PROCEDURE — 70450 CT HEAD/BRAIN W/O DYE: CPT | Performed by: OTHER

## 2018-07-22 RX ORDER — HEPARIN SODIUM AND DEXTROSE 10000; 5 [USP'U]/100ML; G/100ML
INJECTION INTRAVENOUS CONTINUOUS
Status: DISCONTINUED | OUTPATIENT
Start: 2018-07-23 | End: 2018-07-24

## 2018-07-22 RX ORDER — 0.9 % SODIUM CHLORIDE 0.9 %
VIAL (ML) INJECTION
Status: COMPLETED
Start: 2018-07-22 | End: 2018-07-22

## 2018-07-22 RX ORDER — HEPARIN SODIUM AND DEXTROSE 10000; 5 [USP'U]/100ML; G/100ML
1000 INJECTION INTRAVENOUS ONCE
Status: COMPLETED | OUTPATIENT
Start: 2018-07-22 | End: 2018-07-22

## 2018-07-22 RX ORDER — HEPARIN SODIUM 1000 [USP'U]/ML
5000 INJECTION, SOLUTION INTRAVENOUS; SUBCUTANEOUS ONCE
Status: COMPLETED | OUTPATIENT
Start: 2018-07-22 | End: 2018-07-22

## 2018-07-22 RX ORDER — ASPIRIN 325 MG
325 TABLET, DELAYED RELEASE (ENTERIC COATED) ORAL DAILY
Status: DISCONTINUED | OUTPATIENT
Start: 2018-07-23 | End: 2018-07-23

## 2018-07-22 RX ORDER — CLOPIDOGREL BISULFATE 75 MG/1
75 TABLET ORAL DAILY
Qty: 30 TABLET | Refills: 0 | Status: SHIPPED | OUTPATIENT
Start: 2018-07-23 | End: 2018-07-24

## 2018-07-22 NOTE — CONSULTS
[facility]    PATIENT'S NAME: Dori Levin   ATTENDING PHYSICIAN: Anson Rod MD   CONSULTING PHYSICIAN: Tom Fitzgearld MD   PATIENT ACCOUNT#:   [id number]    LOCATION:  [address] [place] [address] Loop #:   [id number]       DATE OF BIRTH:  0 in the left arm is normal.  Deep tendon reflexes symmetric, absent at the right Achilles. No Babinski sign. Right finger-to-nose dysmetria. Left finger-to-nose is normal.      LABORATORY DATA:  LDL 60.     MRI of the brain yesterday revealed acute to sub

## 2018-07-22 NOTE — DISCHARGE SUMMARY
[facility] HOSPITALIST  DISCHARGE SUMMARY     Rosemary Pressley Patient Status:  Inpatient    1955 MRN [id number]   Location [facility] 3W/SW Attending Ed Juarez MD   Hosp Day # 1 PCP Cb Benjamin MD     DATE OF ADMISSION: 2018  DATE OF was ordered. Patient has had 2 syncopal episodes in the last month. The exact nature of this is not clear. It is unclear whether this is related to vertebrobasilar insufficiency, vasovagal episode, occult arrhythmia.   Patient will follow up with neurolo Liraglutide 18 MG/3ML Sopn  Commonly known as:  VICTOZA  What changed:  when to take this      Inject 1.2 mg into the skin every morning.    Quantity:  3 pen  Refills:  4        CONTINUE taking these medications      Instructions Prescription details   at they verbalized understanding and agreement. They understand to return to the emergency room for any concerning signs or symptoms.   Greater than 30 minutes spent on discharge.  -----------------------    Hospital Discharge Diagnoses: Acute stroke    Lace+

## 2018-07-22 NOTE — OCCUPATIONAL THERAPY NOTE
OCCUPATIONAL THERAPY TREATMENT NOTE - INPATIENT        Room Number: 327/327-A           Presenting Problem:  (slurred speech, Rt sided weakness)    Problem List  Principal Problem:    Acute CVA (cerebrovascular accident) [facility])  Active Problems:    Hypergly currently need…  -   Putting on and taking off regular lower body clothing?: A Little  -   Bathing (including washing, rinsing, drying)?: A Little  -   Toileting, which includes using toilet, bedpan or urinal? : A Little  -   Putting on and taking off regu

## 2018-07-22 NOTE — PROGRESS NOTES
Nurse page me regarding noted increased weakness of the right hand. Responded immediately and patient states over the last 3-4 hours he has noticed increased weakness in the right hand, more slurred speech.   He is having no trouble eating dinner at the pr

## 2018-07-23 ENCOUNTER — APPOINTMENT (OUTPATIENT)
Dept: CT IMAGING | Facility: HOSPITAL | Age: 63
DRG: 065 | End: 2018-07-23
Attending: Other

## 2018-07-23 ENCOUNTER — APPOINTMENT (OUTPATIENT)
Dept: CV DIAGNOSTICS | Facility: HOSPITAL | Age: 63
DRG: 065 | End: 2018-07-23
Attending: Other

## 2018-07-23 LAB
APTT PPP: 36.9 SECONDS (ref 23.2–35.3)
APTT PPP: 40.3 SECONDS (ref 23.2–35.3)
APTT PPP: 41.4 SECONDS (ref 23.2–35.3)
APTT PPP: 45.6 SECONDS (ref 23.2–35.3)
APTT PPP: 56 SECONDS (ref 23.2–35.3)
GLUCOSE BLDC GLUCOMTR-MCNC: 143 MG/DL (ref 70–99)
GLUCOSE BLDC GLUCOMTR-MCNC: 166 MG/DL (ref 70–99)
GLUCOSE BLDC GLUCOMTR-MCNC: 171 MG/DL (ref 70–99)
GLUCOSE BLDC GLUCOMTR-MCNC: 191 MG/DL (ref 70–99)
GLUCOSE BLDC GLUCOMTR-MCNC: 321 MG/DL (ref 70–99)
PLATELET # BLD AUTO: 95 K/UL (ref 140–400)

## 2018-07-23 PROCEDURE — 99233 SBSQ HOSP IP/OBS HIGH 50: CPT | Performed by: HOSPITALIST

## 2018-07-23 PROCEDURE — 93306 TTE W/DOPPLER COMPLETE: CPT | Performed by: OTHER

## 2018-07-23 PROCEDURE — 70450 CT HEAD/BRAIN W/O DYE: CPT | Performed by: OTHER

## 2018-07-23 PROCEDURE — 99233 SBSQ HOSP IP/OBS HIGH 50: CPT | Performed by: OTHER

## 2018-07-23 RX ORDER — ATORVASTATIN CALCIUM 40 MG/1
80 TABLET, FILM COATED ORAL NIGHTLY
Status: DISCONTINUED | OUTPATIENT
Start: 2018-07-23 | End: 2018-07-24

## 2018-07-23 RX ORDER — 0.9 % SODIUM CHLORIDE 0.9 %
VIAL (ML) INJECTION
Status: COMPLETED
Start: 2018-07-23 | End: 2018-07-23

## 2018-07-23 RX ORDER — HEPARIN SODIUM 1000 [USP'U]/ML
30 INJECTION, SOLUTION INTRAVENOUS; SUBCUTANEOUS ONCE
Status: COMPLETED | OUTPATIENT
Start: 2018-07-23 | End: 2018-07-23

## 2018-07-23 NOTE — OCCUPATIONAL THERAPY NOTE
Pt approved for OT session by KIRT Aranda - pt indicating he feels weaker than this weekend and \"right hand isn't as strong\"  Pt very fatigued, became nauseous when moving in bed and requested to sleep as he declined to participate in therapy at this time -

## 2018-07-23 NOTE — PROGRESS NOTES
[facility]    Progress Note    Acey Means Patient Status:  Inpatient    1955 MRN [id number]   Location [facility] 3W/SW Attending Opal Jenkins MD   Hosp Day # 2 PCP Clotilde Gallo MD       Subjective:   Acey Means is UNIT/ML flexpen 1-7 Units 1-7 Units Subcutaneous TID CC and HS   ondansetron HCl (ZOFRAN) injection 4 mg 4 mg Intravenous Q6H PRN   acetaminophen (TYLENOL) tab 650 mg 650 mg Oral Q6H PRN   baclofen (LIORESAL) tab 20 mg 20 mg Oral TID PRN   insulin detemir 7/22/2018 at 18:46     Approved by (CST): Jose R Yates MD on 7/22/2018 at 18:54                  Justin Patel MD, MD  7/23/2018

## 2018-07-23 NOTE — CONSULTS
[facility]    Report of Consultation    Radha Izquierdo Patient Status:  Inpatient    1955 MRN [id number]   Location [facility] 3W/SW Attending Darshan Viveros MD   Hosp Day # 2 PCP Marrion Rubinstein, MD     Date of Admission:   g 15 g Oral Q15 Min PRN   Insulin Aspart Pen (NOVOLOG) 100 UNIT/ML flexpen 1-7 Units 1-7 Units Subcutaneous TID CC and HS   ondansetron HCl (ZOFRAN) injection 4 mg 4 mg Intravenous Q6H PRN   acetaminophen (TYLENOL) tab 650 mg 650 mg Oral Q6H PRN   baclofen Scheduled Meds:   • aspirin  325 mg Oral Daily   • atorvastatin  40 mg Oral Nightly   • Losartan Potassium  50 mg Oral Daily   • OCUVITE-LUTEIN  1 tablet Oral Daily   • Insulin Aspart Pen  1-7 Units Subcutaneous TID CC and HS   • Clopidogrel Bisulfate (CST):  Cleveland Arroyo MD on 7/22/2018 at 18:46     Approved by (CST): Cleveland Arroyo MD on 7/22/2018 at 18:54          Mra Brain ([id number])    Result Date: 7/21/2018  CONCLUSION:  1. Multifocal high-grade stenoses of the vertebral arteries, without eviden multiple territories it can be caused by embolization,   - neuro on board  - advise SOPHY can be done tomorrow or Wednesday to look for any clot in REJI if he has paroxysmal A fib  - continue ASA  - increase lipitor to 80 (LDL is normal but need highest dose

## 2018-07-23 NOTE — PHYSICAL THERAPY NOTE
PHYSICAL THERAPY TREATMENT NOTE - INPATIENT     Room Number: 327/327-A       Presenting Problem: new onset slurred speech, RUE/RLE weakness/incoordination    Problem List  Principal Problem:    Acute CVA (cerebrovascular accident) [facility])  Active Problems: Treatment Plan: Coordination;Patient education;Gait training;Neuromuscular re-educate;Stair training;Balance training    SUBJECTIVE  \"I'm doing better\"    OBJECTIVE  Precautions: Orthotic/prosthesis    WEIGHT BEARING RESTRICTION  Weight Bearing Restricti addressed; Family present    CURRENT GOALS   Goals to be met by: 7/31/18  Patient Goal Patient's self-stated goal is: to go home   Goal #1        Goal #1   Current Status     Goal #2        Goal #2  Current Status     Goal #3 Patient is able to ambulate 150

## 2018-07-23 NOTE — CONSULTS
[facility]    PATIENT'S NAME: Topher Young   ATTENDING PHYSICIAN: Anson Morel MD   CONSULTING PHYSICIAN: Rita Wang MD   PATIENT ACCOUNT#:   [id number]    LOCATION:  [address] [place] [address] #:   [id number]       DATE OF BIRTH:  0 hemiparesis. Strength in the left arm is normal.  Deep tendon reflexes symmetric, absent at the right Achilles. No Babinski sign. Right finger-to-nose dysmetria. Left finger-to-nose is normal.    LABORATORY DATA:  LDL 60.     MRI of the brain yesterday

## 2018-07-23 NOTE — CM/SW NOTE
ADDENDUM 4:17PM  KELSEY met with pt and discussed discharge options. Pt is medicaid pending and KELSEY spoke with Yanira Bledsoe at [facility] to see if they can accept pt pro-eh. KELSEY sent [facility] orders and infomed her that pt is expected to d/c today.  Pt stated that he

## 2018-07-23 NOTE — PROGRESS NOTES
[facility]  Hospitalist Progress  Note     Abhiijt Thornton Patient Status:  Inpatient      58year old [facility] [id number]   Location 327/327-A Attending Reagan Price MD   Hosp Day # 2 PCP Loyda Marshall MD     ASSESSMENT/PLAN    Acute [facility]    Labs:  Recent Labs   Lab  07/20/18   2348  07/22/18   1933  07/23/18   0609   RBC  4.72  4.76   --    HGB  12.7*  13.0*   --    HCT  38.1*  38.8*   --    MCV  80.7  81.4   --    MCH  26.8*  27.3   --    MCHC  33.2  33.5   --    RDW  14.9  15

## 2018-07-23 NOTE — PROGRESS NOTES
[facility]  Hospitalist Progress  Note     Keely Meraz Patient Status:  Inpatient      58year old [facility] [id number]   Location /327-A Attending Norma Wood MD   Hosp Day # 2 PCP Nancy Salcido MD     ASSESSMENT/PLAN    Acute Lab  07/20/18   2348  07/22/18   1933   GLU  238*  332*   BUN  16  22*   CREATSERUM  1.23  1.21   GFRAA  >60  >60   GFRNAA  >60  >60   CA  8.7  9.1   ALB  3.3*   --    NA  134*  133*   K  4.2  4.5   CL  102  98   CO2  26  25   ALKPHO  57   --    AST  22

## 2018-07-24 ENCOUNTER — APPOINTMENT (OUTPATIENT)
Dept: CT IMAGING | Facility: HOSPITAL | Age: 63
DRG: 065 | End: 2018-07-24
Attending: Other

## 2018-07-24 ENCOUNTER — APPOINTMENT (OUTPATIENT)
Dept: NUCLEAR MEDICINE | Facility: HOSPITAL | Age: 63
DRG: 065 | End: 2018-07-24
Attending: HOSPITALIST

## 2018-07-24 ENCOUNTER — HOSPITAL ENCOUNTER (INPATIENT)
Facility: HOSPITAL | Age: 63
LOS: 6 days | Discharge: HOME HEALTH | DRG: 065 | End: 2018-07-30
Attending: Other | Admitting: HOSPITALIST

## 2018-07-24 ENCOUNTER — APPOINTMENT (OUTPATIENT)
Dept: CV DIAGNOSTICS | Facility: HOSPITAL | Age: 63
DRG: 065 | End: 2018-07-24
Attending: HOSPITALIST

## 2018-07-24 VITALS
OXYGEN SATURATION: 98 % | DIASTOLIC BLOOD PRESSURE: 69 MMHG | HEART RATE: 72 BPM | SYSTOLIC BLOOD PRESSURE: 124 MMHG | WEIGHT: 194.31 LBS | RESPIRATION RATE: 18 BRPM | HEIGHT: 72 IN | TEMPERATURE: 98 F | BODY MASS INDEX: 26.32 KG/M2

## 2018-07-24 LAB
ANION GAP SERPL CALC-SCNC: 9 MMOL/L (ref 0–18)
APTT PPP: 59 SECONDS (ref 23.2–35.3)
BASOPHILS # BLD AUTO: 0.02 X10(3) UL (ref 0–0.1)
BASOPHILS NFR BLD AUTO: 0.3 %
BUN SERPL-MCNC: 27 MG/DL (ref 8–20)
BUN/CREAT SERPL: 23.1 (ref 10–20)
CALCIUM SERPL-MCNC: 9.1 MG/DL (ref 8.5–10.5)
CHLORIDE SERPL-SCNC: 101 MMOL/L (ref 95–110)
CO2 SERPL-SCNC: 24 MMOL/L (ref 22–32)
CREAT SERPL-MCNC: 1.17 MG/DL (ref 0.5–1.5)
EOSINOPHIL # BLD AUTO: 0.17 X10(3) UL (ref 0–0.3)
EOSINOPHIL NFR BLD AUTO: 2.2 %
ERYTHROCYTE [DISTWIDTH] IN BLOOD BY AUTOMATED COUNT: 14 % (ref 11.5–16)
GLUCOSE BLDC GLUCOMTR-MCNC: 130 MG/DL (ref 70–99)
GLUCOSE BLDC GLUCOMTR-MCNC: 227 MG/DL (ref 70–99)
GLUCOSE BLDC GLUCOMTR-MCNC: 259 MG/DL (ref 70–99)
GLUCOSE SERPL-MCNC: 300 MG/DL (ref 70–99)
HAV IGM SER QL: 2.1 MG/DL (ref 1.8–2.5)
HCT VFR BLD AUTO: 39.4 % (ref 37–53)
HGB BLD-MCNC: 12.8 G/DL (ref 13–17)
IMMATURE GRANULOCYTE COUNT: 0.03 X10(3) UL (ref 0–1)
IMMATURE GRANULOCYTE RATIO %: 0.4 %
INR BLD: 1.17 (ref 0.9–1.1)
LYMPHOCYTES # BLD AUTO: 1.54 X10(3) UL (ref 0.9–4)
LYMPHOCYTES NFR BLD AUTO: 19.7 %
MCH RBC QN AUTO: 26.9 PG (ref 27–33.2)
MCHC RBC AUTO-ENTMCNC: 32.5 G/DL (ref 31–37)
MCV RBC AUTO: 82.8 FL (ref 80–99)
MONOCYTES # BLD AUTO: 0.63 X10(3) UL (ref 0.1–1)
MONOCYTES NFR BLD AUTO: 8.1 %
NEUTROPHIL ABS PRELIM: 5.42 X10 (3) UL (ref 1.3–6.7)
NEUTROPHILS # BLD AUTO: 5.42 X10(3) UL (ref 1.3–6.7)
NEUTROPHILS NFR BLD AUTO: 69.3 %
OSMOLALITY UR CALC.SUM OF ELEC: 294 MOSM/KG (ref 275–295)
PHOSPHATE SERPL-MCNC: 3.4 MG/DL (ref 2.5–4.9)
PLATELET # BLD AUTO: 106 K/UL (ref 140–400)
PLATELET # BLD AUTO: 116 10(3)UL (ref 150–450)
POTASSIUM SERPL-SCNC: 4.6 MMOL/L (ref 3.3–5.1)
PSA SERPL DL<=0.01 NG/ML-MCNC: 15.4 SECONDS (ref 12.4–14.7)
RBC # BLD AUTO: 4.76 X10(6)UL (ref 4.3–5.7)
RED CELL DISTRIBUTION WIDTH-SD: 41.6 FL (ref 35.1–46.3)
SODIUM SERPL-SCNC: 134 MMOL/L (ref 136–144)
WBC # BLD AUTO: 7.8 X10(3) UL (ref 4–13)

## 2018-07-24 PROCEDURE — 70498 CT ANGIOGRAPHY NECK: CPT | Performed by: OTHER

## 2018-07-24 PROCEDURE — 93017 CV STRESS TEST TRACING ONLY: CPT | Performed by: HOSPITALIST

## 2018-07-24 PROCEDURE — 99291 CRITICAL CARE FIRST HOUR: CPT | Performed by: OTHER

## 2018-07-24 PROCEDURE — 78452 HT MUSCLE IMAGE SPECT MULT: CPT | Performed by: HOSPITALIST

## 2018-07-24 PROCEDURE — 99239 HOSP IP/OBS DSCHRG MGMT >30: CPT | Performed by: HOSPITALIST

## 2018-07-24 PROCEDURE — 70496 CT ANGIOGRAPHY HEAD: CPT | Performed by: OTHER

## 2018-07-24 PROCEDURE — 99292 CRITICAL CARE ADDL 30 MIN: CPT | Performed by: OTHER

## 2018-07-24 PROCEDURE — 99223 1ST HOSP IP/OBS HIGH 75: CPT | Performed by: HOSPITALIST

## 2018-07-24 RX ORDER — MORPHINE SULFATE 4 MG/ML
1 INJECTION, SOLUTION INTRAMUSCULAR; INTRAVENOUS EVERY 2 HOUR PRN
Status: DISCONTINUED | OUTPATIENT
Start: 2018-07-24 | End: 2018-07-28

## 2018-07-24 RX ORDER — SODIUM CHLORIDE 9 MG/ML
INJECTION, SOLUTION INTRAVENOUS ONCE
Status: DISCONTINUED | OUTPATIENT
Start: 2018-07-24 | End: 2018-07-24

## 2018-07-24 RX ORDER — ASPIRIN 81 MG/1
81 TABLET ORAL DAILY
Status: DISCONTINUED | OUTPATIENT
Start: 2018-07-24 | End: 2018-07-24

## 2018-07-24 RX ORDER — ATORVASTATIN CALCIUM 80 MG/1
80 TABLET, FILM COATED ORAL NIGHTLY
Status: DISCONTINUED | OUTPATIENT
Start: 2018-07-24 | End: 2018-07-30

## 2018-07-24 RX ORDER — ACETAMINOPHEN 650 MG/1
650 SUPPOSITORY RECTAL EVERY 4 HOURS PRN
Status: DISCONTINUED | OUTPATIENT
Start: 2018-07-24 | End: 2018-07-30

## 2018-07-24 RX ORDER — METOCLOPRAMIDE HYDROCHLORIDE 5 MG/ML
10 INJECTION INTRAMUSCULAR; INTRAVENOUS EVERY 8 HOURS PRN
Status: DISCONTINUED | OUTPATIENT
Start: 2018-07-24 | End: 2018-07-30

## 2018-07-24 RX ORDER — DOCUSATE SODIUM 100 MG/1
100 CAPSULE, LIQUID FILLED ORAL 2 TIMES DAILY
Status: DISCONTINUED | OUTPATIENT
Start: 2018-07-24 | End: 2018-07-30

## 2018-07-24 RX ORDER — SODIUM CHLORIDE 9 MG/ML
INJECTION, SOLUTION INTRAVENOUS CONTINUOUS
Status: ACTIVE | OUTPATIENT
Start: 2018-07-24 | End: 2018-07-26

## 2018-07-24 RX ORDER — ONDANSETRON 2 MG/ML
4 INJECTION INTRAMUSCULAR; INTRAVENOUS EVERY 6 HOURS PRN
Status: DISCONTINUED | OUTPATIENT
Start: 2018-07-24 | End: 2018-07-30

## 2018-07-24 RX ORDER — SODIUM PHOSPHATE, DIBASIC AND SODIUM PHOSPHATE, MONOBASIC 7; 19 G/133ML; G/133ML
1 ENEMA RECTAL ONCE AS NEEDED
Status: DISCONTINUED | OUTPATIENT
Start: 2018-07-24 | End: 2018-07-30

## 2018-07-24 RX ORDER — SODIUM CHLORIDE 9 MG/ML
INJECTION, SOLUTION INTRAVENOUS
Status: DISCONTINUED
Start: 2018-07-24 | End: 2018-07-24

## 2018-07-24 RX ORDER — CLOPIDOGREL BISULFATE 75 MG/1
75 TABLET ORAL DAILY
Status: DISCONTINUED | OUTPATIENT
Start: 2018-07-25 | End: 2018-07-30

## 2018-07-24 RX ORDER — SODIUM CHLORIDE 9 MG/ML
INJECTION, SOLUTION INTRAVENOUS
Status: COMPLETED
Start: 2018-07-24 | End: 2018-07-24

## 2018-07-24 RX ORDER — POLYETHYLENE GLYCOL 3350 17 G/17G
17 POWDER, FOR SOLUTION ORAL DAILY PRN
Status: DISCONTINUED | OUTPATIENT
Start: 2018-07-24 | End: 2018-07-30

## 2018-07-24 RX ORDER — SENNOSIDES 8.6 MG
17.2 TABLET ORAL NIGHTLY
Status: DISCONTINUED | OUTPATIENT
Start: 2018-07-24 | End: 2018-07-30

## 2018-07-24 RX ORDER — BISACODYL 10 MG
10 SUPPOSITORY, RECTAL RECTAL
Status: DISCONTINUED | OUTPATIENT
Start: 2018-07-24 | End: 2018-07-30

## 2018-07-24 RX ORDER — METOPROLOL SUCCINATE 25 MG/1
12.5 TABLET, EXTENDED RELEASE ORAL
Qty: 15 TABLET | Refills: 0 | Status: ON HOLD | OUTPATIENT
Start: 2018-07-25 | End: 2018-07-30

## 2018-07-24 RX ORDER — HEPARIN SODIUM 5000 [USP'U]/ML
5000 INJECTION, SOLUTION INTRAVENOUS; SUBCUTANEOUS EVERY 12 HOURS SCHEDULED
Status: DISCONTINUED | OUTPATIENT
Start: 2018-07-24 | End: 2018-07-25

## 2018-07-24 RX ORDER — MORPHINE SULFATE 4 MG/ML
2 INJECTION, SOLUTION INTRAMUSCULAR; INTRAVENOUS EVERY 2 HOUR PRN
Status: DISCONTINUED | OUTPATIENT
Start: 2018-07-24 | End: 2018-07-28

## 2018-07-24 RX ORDER — 0.9 % SODIUM CHLORIDE 0.9 %
VIAL (ML) INJECTION
Status: COMPLETED
Start: 2018-07-24 | End: 2018-07-24

## 2018-07-24 RX ORDER — CLOPIDOGREL BISULFATE 75 MG/1
300 TABLET ORAL ONCE
Status: COMPLETED | OUTPATIENT
Start: 2018-07-24 | End: 2018-07-24

## 2018-07-24 RX ORDER — PHENYLEPHRINE HCL IN 0.9% NACL 50MG/250ML
PLASTIC BAG, INJECTION (ML) INTRAVENOUS CONTINUOUS PRN
Status: DISCONTINUED | OUTPATIENT
Start: 2018-07-24 | End: 2018-07-28

## 2018-07-24 RX ORDER — ACETAMINOPHEN 325 MG/1
650 TABLET ORAL EVERY 4 HOURS PRN
Status: DISCONTINUED | OUTPATIENT
Start: 2018-07-24 | End: 2018-07-30

## 2018-07-24 RX ORDER — ASPIRIN 325 MG
325 TABLET ORAL DAILY
Status: DISCONTINUED | OUTPATIENT
Start: 2018-07-25 | End: 2018-07-30

## 2018-07-24 RX ORDER — METOPROLOL SUCCINATE 25 MG/1
12.5 TABLET, EXTENDED RELEASE ORAL
Status: DISCONTINUED | OUTPATIENT
Start: 2018-07-24 | End: 2018-07-24

## 2018-07-24 RX ORDER — DEXTROSE MONOHYDRATE 25 G/50ML
50 INJECTION, SOLUTION INTRAVENOUS
Status: DISCONTINUED | OUTPATIENT
Start: 2018-07-24 | End: 2018-07-30

## 2018-07-24 RX ORDER — LABETALOL HYDROCHLORIDE 5 MG/ML
10 INJECTION, SOLUTION INTRAVENOUS EVERY 10 MIN PRN
Status: DISCONTINUED | OUTPATIENT
Start: 2018-07-24 | End: 2018-07-28

## 2018-07-24 RX ORDER — ASPIRIN 300 MG
300 SUPPOSITORY, RECTAL RECTAL DAILY
Status: DISCONTINUED | OUTPATIENT
Start: 2018-07-25 | End: 2018-07-28

## 2018-07-24 NOTE — PLAN OF CARE
Problem: Patient Centered Care  Goal: Patient preferences are identified and integrated in the patient's plan of care  Interventions:  - What would you like us to know as we care for you?  I have a history of below left knee amputation and I would like to b needed  - Ensure adequate protection for wounds/incisions during mobilization  - Obtain PT/OT consults as needed  - Advance activity as appropriate  - Communicate ordered activity level and limitations with patient/family    Outcome: Progressing

## 2018-07-24 NOTE — HOME CARE LIAISON
Met with patient, his wife and son Anita Guillen at the bedside. All are agreeable to [facility]. Brochure and liaison's business card provided with contact information. All questions addressed and answered.

## 2018-07-24 NOTE — PROGRESS NOTES
Cardiology progress note      Objective finding: Mariusz Loerar today positive  Current Medications:    Current Facility-Administered Medications:  apixaban (ELIQUIS) tab 5 mg 5 mg Oral BID   atorvastatin (LIPITOR) tab 80 mg 80 mg Oral Nightly   Losartan Misael Allergies    Review of Systems:    Pertinent items are noted in HPI. Physical Exam:   Blood pressure 130/70, pulse 74, temperature 98.3 °F (36.8 °C), temperature source Oral, resp.  rate 18, height 182.9 cm (6'), weight 194 lb 4.8 oz (88.1 kg), SpO2 93 % Brain Or Head (30040)    Result Date: 7/23/2018  CONCLUSION:  1. Small area of possible low attenuation at the left jose l consistent with small area of acute infarction seen on recent MRI study not well seen on current study in June with artifact.  2. Chroni [id number]  Tel: [phone]

## 2018-07-24 NOTE — PHYSICAL THERAPY NOTE
Attempted to see patient for daily PT treatment however per patient family  1 hr ago right arm numbness and feel like no strength and not able to move right arm.  Talk to KIRT Senior regarding patient symptoms and per RN I called PCT and neurologist and they

## 2018-07-24 NOTE — PROGRESS NOTES
[facility]    Progress Note    Carlee Zaragoza Patient Status:  Inpatient    1955 MRN [id number]   Location [facility] 3W/SW Attending Chip Marquis MD   Hosp Day # 3 PCP Rozina Gutierrez MD       Subjective:   Carlee Zaragoza mg Oral Daily   Metoprolol Succinate ER (Toprol XL) 24 hr tab 12.5 mg 12.5 mg Oral Daily Beta Blocker   0.9%  NaCl infusion  Intravenous Once   atorvastatin (LIPITOR) tab 80 mg 80 mg Oral Nightly   Losartan Potassium (COZAAR) tab 50 mg 50 mg Oral Daily   O 04/20/2018   ESRML 65 (H) 12/25/2017   MG 1.9 06/26/2018   TROP 0.03 06/27/2018       Ct Brain Or Head (07526)    Result Date: 7/23/2018  CONCLUSION:  1.  Small area of possible low attenuation at the left jose l consistent with small area of acute infarction

## 2018-07-24 NOTE — OCCUPATIONAL THERAPY NOTE
OCCUPATIONAL THERAPY TREATMENT NOTE - INPATIENT        Room Number: 327/327-A           Presenting Problem:  (slurred speech, Rt sided weakness)    Problem List  Principal Problem:    Acute CVA (cerebrovascular accident) [facility])  Active Problems:    Hypergly ACTIVITIES OF DAILY LIVING ASSESSMENT  AM-PAC ‘6-Clicks’ Inpatient Daily Activity Short Form  How much help from another person does the patient currently need…  -   Putting on and taking off regular lower body clothing?: A Little  -   Bathing (ehsani  on:     Frequency:  5 x week          Fiorella Navarro, OTR/L   [facility]

## 2018-07-24 NOTE — CM/SW NOTE
Case Management / Progression of Care  Patients neurologic symptoms worse this afternoon at 3 pm    Per Dr. Steph Mcnair, Note    \" Since neurology consult, neurological deficits reveal moderate right hemiparesis, dysarthria, dysphasia however, is slightly wors

## 2018-07-24 NOTE — PLAN OF CARE
Notified by Dr. Racquel Short of planned transfer of patient to [facility] for possible neurointervention. Dr. Racquel Short discussed with Neurointerventionalists at [facility] and Dr. Jason Lee notified Hospitalist team at [facility].   This RN notified Loyd Hudson

## 2018-07-24 NOTE — OCCUPATIONAL THERAPY NOTE
Pt off of floor for stress test - will continue to follow and see for OT session when he returns to the floor

## 2018-07-25 ENCOUNTER — APPOINTMENT (OUTPATIENT)
Dept: MRI IMAGING | Facility: HOSPITAL | Age: 63
DRG: 065 | End: 2018-07-25
Attending: Other

## 2018-07-25 PROBLEM — I63.9 CVA (CEREBRAL VASCULAR ACCIDENT) (HCC): Status: ACTIVE | Noted: 2018-07-25

## 2018-07-25 LAB
ALBUMIN SERPL-MCNC: 2.9 G/DL (ref 3.5–4.8)
ALBUMIN/GLOB SERPL: 0.7 {RATIO} (ref 1–2)
ALP LIVER SERPL-CCNC: 75 U/L (ref 45–117)
ALT SERPL-CCNC: 22 U/L (ref 17–63)
ANION GAP SERPL CALC-SCNC: 6 MMOL/L (ref 0–18)
AST SERPL-CCNC: 18 U/L (ref 15–41)
ATRIAL RATE: 69 BPM
BILIRUB SERPL-MCNC: 0.7 MG/DL (ref 0.1–2)
BUN BLD-MCNC: 21 MG/DL (ref 8–20)
BUN/CREAT SERPL: 18.9 (ref 10–20)
CALCIUM BLD-MCNC: 8.7 MG/DL (ref 8.3–10.3)
CHLORIDE SERPL-SCNC: 107 MMOL/L (ref 101–111)
CO2 SERPL-SCNC: 27 MMOL/L (ref 22–32)
CREAT BLD-MCNC: 1.11 MG/DL (ref 0.7–1.3)
ERYTHROCYTE [DISTWIDTH] IN BLOOD BY AUTOMATED COUNT: 13.9 % (ref 11.5–16)
GLOBULIN PLAS-MCNC: 4.3 G/DL (ref 2.5–3.7)
GLUCOSE BLD-MCNC: 150 MG/DL (ref 65–99)
GLUCOSE BLD-MCNC: 156 MG/DL (ref 65–99)
GLUCOSE BLD-MCNC: 158 MG/DL (ref 65–99)
GLUCOSE BLD-MCNC: 160 MG/DL (ref 65–99)
GLUCOSE BLD-MCNC: 179 MG/DL (ref 70–99)
GLUCOSE BLD-MCNC: 215 MG/DL (ref 65–99)
HCT VFR BLD AUTO: 40.5 % (ref 37–53)
HGB BLD-MCNC: 13.5 G/DL (ref 13–17)
M PROTEIN MFR SERPL ELPH: 7.2 G/DL (ref 6.1–8.3)
MCH RBC QN AUTO: 27.1 PG (ref 27–33.2)
MCHC RBC AUTO-ENTMCNC: 33.3 G/DL (ref 31–37)
MCV RBC AUTO: 81.3 FL (ref 80–99)
OSMOLALITY SERPL CALC.SUM OF ELEC: 297 MOSM/KG (ref 275–295)
P AXIS: 41 DEGREES
P-R INTERVAL: 170 MS
PLATELET # BLD AUTO: 179 10(3)UL (ref 150–450)
POTASSIUM SERPL-SCNC: 4.2 MMOL/L (ref 3.6–5.1)
Q-T INTERVAL: 410 MS
QRS DURATION: 88 MS
QTC CALCULATION (BEZET): 439 MS
R AXIS: 53 DEGREES
RBC # BLD AUTO: 4.98 X10(6)UL (ref 4.3–5.7)
RED CELL DISTRIBUTION WIDTH-SD: 40.2 FL (ref 35.1–46.3)
SODIUM SERPL-SCNC: 140 MMOL/L (ref 136–144)
T AXIS: 117 DEGREES
VENTRICULAR RATE: 69 BPM
WBC # BLD AUTO: 12 X10(3) UL (ref 4–13)

## 2018-07-25 PROCEDURE — 70553 MRI BRAIN STEM W/O & W/DYE: CPT | Performed by: NURSE PRACTITIONER

## 2018-07-25 PROCEDURE — 99233 SBSQ HOSP IP/OBS HIGH 50: CPT | Performed by: HOSPITALIST

## 2018-07-25 PROCEDURE — 99291 CRITICAL CARE FIRST HOUR: CPT | Performed by: OTHER

## 2018-07-25 RX ORDER — HEPARIN SODIUM 5000 [USP'U]/ML
5000 INJECTION, SOLUTION INTRAVENOUS; SUBCUTANEOUS EVERY 12 HOURS SCHEDULED
Status: DISCONTINUED | OUTPATIENT
Start: 2018-07-25 | End: 2018-07-30

## 2018-07-25 RX ORDER — ACETAMINOPHEN 325 MG/1
650 TABLET ORAL EVERY 6 HOURS PRN
Status: DISCONTINUED | OUTPATIENT
Start: 2018-07-25 | End: 2018-07-25

## 2018-07-25 NOTE — SLP NOTE
Attempted evaluation, patient to remain NPO until determined whether intervention for CVA required. Will hold at this time and reattempt later today as schedule permits. Discussed with KIRT.     Jacqueline Lyons Fer 87 CCC-SLP  Pager [phone]

## 2018-07-25 NOTE — CM/SW NOTE
07/25/18 1200   CM/SW Referral Data   Referral Source Physician   Reason for Referral Discharge planning;Protocol order set   Specify order set Stroke   Informant Patient;Spouse   Patient Info   Patient's Mental Status Alert;Oriented   Patient's Home En

## 2018-07-25 NOTE — OCCUPATIONAL THERAPY NOTE
OCCUPATIONAL THERAPY EVALUATION - INPATIENT     Room Number: 6010/1816-Z  Evaluation Date: 7/25/2018  Type of Evaluation: Initial  Presenting Problem: L CVA    Physician Order: IP Consult to Occupational Therapy  Reason for Therapy: ADL/IADL Dysfunction an Level of Function: Pt reports modified independence with ADL and ambulation without AD prior to admit. Pt manages cooking and cleaning at home. Pt has not been driving recently.       SUBJECTIVE   Pt states \"Let me give it a try\" RE managing shoe laces Form  How much help from another person does the patient currently need…  -   Putting on and taking off regular lower body clothing?: A Lot  -   Bathing (including washing, rinsing, drying)?: A Lot  -   Toileting, which includes using toilet, bedpan or uri work consult to be placed, this order has been placed. .   . In this OT evaluation patient presents with the following performance deficits: RUE strength/coordination/ROM, endurance, balance, functional reach, use of adaptive techniques.  These deficits im Goal  Patient will be independent with bilateral AROM HEP (home exercise program).     Additional Goals:  Pt will complete 9HPT  Pt will be independent with R coordination HEP

## 2018-07-25 NOTE — PROGRESS NOTES
[facility]  Neurocritical Care APRN Progress Note    NAME: Bj Rosas - ROOM: 0017/5695-Z - MRN: [id number] - Age: 58year old - :1955    History Of Present Illness:  Bj Rosas is a 58year old male with PMHx significant weakness noted to fluctuate dependent on activity and position--symptoms worse when up in chair or walking but improved once lying in bed.   Today weakness and dysarthria worsened after PT, so Neurology re-evaluated patient and decision was made to transfer Appearance: Alert, cooperative, no acute distress, appears stated age  Neck: Supple, symmetrical, trachea midline, + carotid bruits, adenopathy, or JVD  Lungs: Clear to auscultation bilaterally, respirations unlabored  Heart: Regular rate and rhythm, S1 an additional/new acute abnormality demonstrated.  4. Moderate intracranial atherosclerosis     Dictated by (CST): Miguel Angel Pruitt MD on 7/23/2018 at 20:28     Approved by (CST): Miguel Angel Pruitt MD on 7/23/2018 at 20:33          [address] ([id number])  Res 4. Lesser incidental findings as above. Dictated by (CST): Hemalatha Beck MD on 7/21/2018 at 16:24     Approved by (CST): Hemalatha Beck MD on 7/21/2018 at 16:28          Mra Carotids (w+wo) (cpt=70549)  Result Date: 7/21/2018  CONCLUSION:  1.  There calcification at the bilateral carotid siphons. 75% narrowing of the left internal carotid artery at the [facility] segment. Greater than 90% narrowing of the bilateral vertebral arteries at the V4 segments. No focal vascular cutoff or aneurysmal dilation. prevent and manage pain with prn medications  B- On RA  C- RASS 0  D- CAM-ICU ed  E- Bedrest  F- Family updated at bedside  G- No Central Line or Sahu    Proph:  -SQ heparin 5000u BID  -SCD  -Pepcid    Dispo:   -Full code  -Neuro ICU for close monitoring

## 2018-07-25 NOTE — PHYSICAL THERAPY NOTE
Order received for PT eval. Attempted to see Pt this am, however, Pt remains on bedrest per stroke protocol. Will follow up later, as activity level upgraded and schedule permits.

## 2018-07-25 NOTE — PROGRESS NOTES
Called to patients bedside by family. Patient reporting weakness of his right arm and right leg, his speech is slurred. He remains oriented. Telemetry showing sinus rhythm. Patient assisted back to bed. Michelle Camarena and Dirk notified.   Dr. Pamalee Oppenheim

## 2018-07-25 NOTE — CONSULTS
[address] Patient Status:  Inpatient    1955 MRN [id number]   [facility] 6NE-A Attending Abraham Singh MD   Hosp Day # 1 PCP Porsche Quiroz MD     Patient Identification  Libby Glasgow is a 58year old male. segments. He has been seen by cardiology with his known aortic stenosis with 2 recent syncopal episodes. He is recommended for left heart cath as an outpatient.   His ongoing impairments include slurred speech, right arm more than leg weakness, decreased ONCE PRN   Heparin Sodium (Porcine) 5000 UNIT/ML injection 5,000 Units 5,000 Units Subcutaneous 2 times per day   [COMPLETED] regadenoson (LEXISCAN) 0.4 MG/5ML injection      [COMPLETED] Sodium Chloride 0.9 % solution      [COMPLETED] sodium chloride 0.9 % chewable tab 4 tablet 4 tablet Oral Q15 Min PRN   Or      dextrose 50 % injection 50 mL 50 mL Intravenous Q15 Min PRN   Or      glucose (DEX4) oral liquid 30 g 30 g Oral Q15 Min PRN   Or      Glucose-Vitamin C (DEX-4) 4-0.006 g chewable tab 8 tablet 8 tabl functional.   Ears/Nose/Throat: Hearing intact. Lips, mucosa, and tongue normal. Teeth and gums normal. Moist mucous membranes. Neck: No neck masses or thyroid enlargement/tenderness/nodules.        Lungs:   Resonant, clear breath sounds, quiet accessor natremia, improved. PLAN:                 With initial physical and occupational therapy he has had no syncope suggestive of symptomatic aortic stenosis or vertebrobasilar insufficiency and thus is doing well with therapy tolerance.   He will need clos

## 2018-07-25 NOTE — PROGRESS NOTES
[facility]  Progress Note     Cesilia Bowers Patient Status:  Inpatient    1955 MRN [id number]   [facility] 6NE-A Attending Sidra Peña MD   Hosp Day # 1 PCP Joanna Mcgee MD     Chief Complaint: CVA    S: Patient feels much Imaging: Imaging data reviewed in Epic.     Medications:   • insulin detemir  25 Units Subcutaneous Nightly   • atorvastatin  80 mg Oral Nightly   • Senna  17.2 mg Oral Nightly   • docusate sodium  100 mg Oral BID   • aspirin  300 mg Rectal Daily    Or

## 2018-07-25 NOTE — CONSULTS
[address] Patient Status:  Inpatient    1955 MRN [id number]   [facility] 6NE-A Attending Lokesh Otero MD   Hosp Day # 1 PCP MD Micky Cahvez [id number] patient denies associated headaches, neck pain, nausea, or vomiting.   The patient denies cranial nerve symptoms such as diplopia, photophobia, ptosis, facial weakness/paresthesias, hearing loss, vertigo, tinnitus, hoarseness, dysphagia, or alternations in Vitro Strip Check glucose 3 x daily. Use as directed. Disp: 300 strip Rfl: 3 Taking   atorvastatin 40 MG Oral Tab Take 1 tablet (40 mg total) by mouth nightly.  Disp: 90 tablet Rfl: 1 Taking   Losartan Potassium 50 MG Oral Tab Take 1 tablet (50 mg total) by packet 17 g 17 g Oral Daily PRN   magnesium hydroxide (MILK OF MAGNESIA) 400 MG/5ML suspension 30 mL 30 mL Oral Daily PRN   bisacodyl (DULCOLAX) rectal suppository 10 mg 10 mg Rectal Daily PRN   FLEET ENEMA (FLEET) 7-19 GM/118ML enema 133 mL 1 enema Rectal 4+/5, very minor drift in the LE but no drift observed in the upper extremity LUE / LLE 5/5,   Finger-to-nose coordination is intact. Gait deferred.         DIAGNOSTIC DATA:   Lab Results  Component Value Date   WBC 12.0 07/25/2018   HGB 13.5 07/25/2018 significant stenoses are apparent. 4. Lesser incidental findings as above. MRA Carotids 7/21/18  CONCLUSION:   1. There are high-grade stenoses of the V4 segments of the vertebral arteries bilaterally.  This may be contributory to vertebrobasilar ins vertebral-basilar insufficiency in setting of suspected severe bilateral vertebral artery stenoses on CTA imaging    CTA head/neck studies interpretation  1) Cervical atherosclerotic disease with severe > 70% calcified right VA ostial stenosis and  ~50% le emboli  Recurrent syncopal episodes? JACQUE DEL TORO [facility] Cardiology consult  TTE/ECHO and bubble study negative   Exclude paroxysmal arrhythmia ?   Severe aortic stenosis management with need to taper antihypertensives     Stroke Rehabilitation  Neurological exam an

## 2018-07-25 NOTE — CONSULTS
[facility]  Neurocritical Care       Name: Lonnie Berry  MRN: [id number]  Admission Date/Time: 7/24/2018  7:13 PM  Primary Care Provider:  Julee Stout MD        Reason for Consultation:   Left pontine stroke and b/l V4 steniosis    H positive by cardiology. Over the weekend and into today right side weakness noted to fluctuate dependent on activity and position--symptoms worse when up in chair or walking but improved once lying in bed.   Today weakness and dysarthria worsened after PT, Medical History:   Diagnosis Date   • Diabetes [facility])    • Essential hypertension    • High blood pressure    • Hx of BKA, left ([facility] [address] 01/2018   • S/P BKA (below knee amputation) unilateral, left ([facility] 1/19/2018   • Stroke [facility])        History reviewed.  No p Number of children:               Social History Main Topics    Smoking status: Never Smoker                                                                Smokeless tobacco: Never Used                        Alcohol use:  No              Drug 300 mg Rectal Daily   Or      aspirin tab 325 mg 325 mg Oral Daily   Heparin Sodium (Porcine) 5000 UNIT/ML injection 5,000 Units 5,000 Units Subcutaneous 2 times per day   Clopidogrel Bisulfate (PLAVIX) tab 75 mg 75 mg Oral Daily   glucose (DEX4) oral liqu fields normal on gross exam. Pupils equal, round, reactive to light and accomodation. # 3, 4, 6: Eyes move in all 6 cardinal directions normally and no Ptosis. # 5: Facial sensation to temp and light touch normal.   #7: left facial asymmetry.   # 2 ODDB current CT study CALVARIUM: No apparent fracture, mass, or other significant visible lesion. SINUSES: Moderate ethmoid air cell opacification.  Mild bilateral maxillary sinus mucosal. Mild mucosal thickening right frontal sinus ORBITS: Limited views are un findings of a subacute area of focal infarction. No surrounding mass effect or edema. CALVARIUM: No apparent fracture, mass, or other significant visible lesion.   SINUSES: There is mild to moderately mucosal thickening and/or fluid in the bilateral frontal Minimal periventricular and subcortical white matter hypodensities are present, consistent with chronic microvascular ischemic disease. CEREBELLUM: No edema, hemorrhage, mass, or acute infarction is seen.  There is cerebellar folial expansion consistent wi used.   FINDINGS:  CSF SPACES: The ventricles, cisterns, and sulci are dilated, but remain commensurate in caliber, consistent with parenchymal volume loss of a degree that is appropriate for age.  No hydrocephalus, subarachnoid hemorrhage, or effacement of by (CST): Varun Sanchez MD on 6/27/2018 at 5:45     Approved by (CST): Varun Sanchez MD on 6/27/2018 at 5:47          Mra Brain (SLF=86296)    Result Date: 7/21/2018  PROCEDURE: MRA BRAIN (MTZ=31081)  COMPARISON: 3 Walker Crane stenosis. SUPERIOR CEREBELLAR: Flow identified at origins. Early bifurcation is noted on the right. ANTERIOR INFERIOR CEREBELLAR: Not visualized. POSTERIOR INFERIOR CEREBELLAR: Flow identified at origins.  Areas of narrowing are seen without hemodynamically angiography was performed without and with intravenous gadolinium, with creation and interpretation of 2D and 3D/multi-planar images of the carotid and vertebral arteries. Evaluation of internal carotid stenosis is based on NASCET Criteria.    FINDINGS:  CESIA Approved by (CST): Smita Cowart MD on 7/21/2018 at 16:41          Mri Brain (cpt=70551)    Result Date: 7/21/2018  PROCEDURE: MRI BRAIN (CPT=70551)  COMPARISON: [facility], MRI BRAIN (W+WO) (EMW=04271), 6/27/2018, 6:12.   INDICATIONS: Rohit Duncan [address], 7/09/2011, 13:12. [facility], CT BRAIN OR HEAD (CPT=70450), 6/26/2018, 23:17. [address], 7/11/2011, 21:12.   INDICATIONS: Transient alteration the distal V4 segment of the left vertebral artery and focal circumferential moderate to severe stenosis of the distal right V4 segment.  There is extensive susceptibility artifact in the cavernous segments of the carotid arteries, compatible with extensive Thresholds (Reference: Shahram Knife al. Radiology 4353; 110:295-845)      Stenosis (%)       PSV (cm/sec)       VICA/VCCA           0-49                    <150                     <2.5           50-69                  150-225                2.5-4.0 stenosis of the mid left vertebral artery. BASILAR ARTERY:  Normal.  No significant stenosis. No visible aneurysm or vascular malformation.       CONCLUSION:   Moderate grade narrowing of the left Yana segment of the internal carotid artery with greate atherosclerotic calcification at the carotid siphon. Remainder of the internal carotid artery is tortuous but otherwise unremarkable. LEFT COMMON CAROTID: No hemodynamically significant stenosis or dissection.   LEFT INTERNAL CAROTID: Severe atheroscleroti the Yana segment. Greater than 90% narrowing of the bilateral vertebral arteries at the V4 segments. No focal vascular cutoff or aneurysmal dilation.      Dictated by (CST): Ponce Berman MD on 7/24/2018 at 18:26     Approved by (CST): Ponce Berman MD on -180, for permissive HTN. 5. Echocardiogram (TTE) - EF 55-60%  6. MRI Brain - Left pontine stroke. 7. IV Fluids 0.9 normal saline @ 75ml/hr. 8. DVT Prophylaxis  9. GI Prophylaxis. 10. PT/OT Eval.  11. Speech Eval.  12. NI Consultation.     Cardiac

## 2018-07-25 NOTE — PLAN OF CARE
NEUROLOGICAL - ADULT    • Absence of seizures Completed    • Remains free of injury related to seizure activity Completed        SKIN/TISSUE INTEGRITY - ADULT    • Skin integrity remains intact Completed    • Incision(s), wounds(s) or drain site(s) healing

## 2018-07-25 NOTE — PHYSICAL THERAPY NOTE
PHYSICAL THERAPY EVALUATION - INPATIENT     Room Number: 1266/9918-V  Evaluation Date: 7/25/2018  Type of Evaluation: Initial  Physician Order: PT Eval and Treat    Presenting Problem: acute CVA  Reason for Therapy: Mobility Dysfunction and Discharge Level:  oriented x4  · Memory:  appears intact  · Following Commands:  follows all commands and directions without difficulty  · Safety Judgement:  good awareness of safety precautions    RANGE OF MOTION AND STRENGTH ASSESSMENT  Upper extremity ROM and str assistance (per FIM, actual MIN)  Distance (ft): 40  Assistive Device: Rolling walker  Pattern: R Foot flat;R Flexed knee          Skilled Therapy Provided: Pt received supine in bed and is agreeable to therapy. Pt alert and oriented x 4.  Pt following all impairments. Functional outcome measures completed include AMPAC and Modified Raquel. Based on this evaluation, patient's clinical presentation is evolving and overall the evaluation complexity is considered high.   These impairments and comorbidities manife

## 2018-07-25 NOTE — PAYOR COMM NOTE
--------------  ADMISSION REVIEW     Payor: N/A  Subscriber #:  [id number]  Authorization Number: N/A    Admit date: 7/24/18  Admit time: 5261       Admitting Physician: Kole Kumar MD  Attending Physician:  Zhane Duffy MD  Primary Care Physician: TP  7.0   --    --        Recent Labs   Lab  07/20/18   2348  07/24/18   2048   PTP  14.9*  15.4*   INR  1.2  1.17*         ASSESSMENT / PLAN:     1. Acute CVA  2. Vertebrobasilar insufficiency  3. Syncope possibly 2/2 vertebrobasilar insufficiency  1.  n Route User    Admitted on 7/24/2018    Discharged on 7/24/2018 7/24/2018 1200 Given 4 Units Subcutaneous (Right Upper Arm) Shantal Finch, KIRT      Insulin Aspart Pen (NOVOLOG) 100 UNIT/ML flexpen 1-5 Units     Date Action Dose Route User    7/25/20 7/24/2018 1701 New Bag 1000 mL (none) Moreno Smalls, RN      0.9%  NaCl infusion     Date Action Dose Route User    7/24/2018 2146 New Bag (none) Intravenous Isai Iqbal RN        PLEASE FAX DAYS CERTIFIED AND NEXT REVIEW DATE

## 2018-07-26 PROBLEM — I63.9 LEFT PONTINE STROKE (HCC): Status: ACTIVE | Noted: 2018-07-25

## 2018-07-26 LAB
ANION GAP SERPL CALC-SCNC: 7 MMOL/L (ref 0–18)
APTT PPP: 40.9 SECONDS (ref 26.1–34.6)
BASOPHILS # BLD AUTO: 0.04 X10(3) UL (ref 0–0.1)
BASOPHILS NFR BLD AUTO: 0.5 %
BUN BLD-MCNC: 19 MG/DL (ref 8–20)
BUN/CREAT SERPL: 18.3 (ref 10–20)
CALCIUM BLD-MCNC: 8.5 MG/DL (ref 8.3–10.3)
CHLORIDE SERPL-SCNC: 106 MMOL/L (ref 101–111)
CO2 SERPL-SCNC: 27 MMOL/L (ref 22–32)
CREAT BLD-MCNC: 1.04 MG/DL (ref 0.7–1.3)
EOSINOPHIL # BLD AUTO: 0.28 X10(3) UL (ref 0–0.3)
EOSINOPHIL NFR BLD AUTO: 3.2 %
ERYTHROCYTE [DISTWIDTH] IN BLOOD BY AUTOMATED COUNT: 13.9 % (ref 11.5–16)
GLUCOSE BLD-MCNC: 145 MG/DL (ref 65–99)
GLUCOSE BLD-MCNC: 174 MG/DL (ref 65–99)
GLUCOSE BLD-MCNC: 189 MG/DL (ref 70–99)
GLUCOSE BLD-MCNC: 191 MG/DL (ref 65–99)
GLUCOSE BLD-MCNC: 266 MG/DL (ref 65–99)
GLUCOSE BLD-MCNC: 271 MG/DL (ref 65–99)
HCT VFR BLD AUTO: 37.8 % (ref 37–53)
HGB BLD-MCNC: 12.7 G/DL (ref 13–17)
IMMATURE GRANULOCYTE COUNT: 0.04 X10(3) UL (ref 0–1)
IMMATURE GRANULOCYTE RATIO %: 0.5 %
INR BLD: 1.17 (ref 0.9–1.1)
LYMPHOCYTES # BLD AUTO: 1.72 X10(3) UL (ref 0.9–4)
LYMPHOCYTES NFR BLD AUTO: 19.6 %
MCH RBC QN AUTO: 27 PG (ref 27–33.2)
MCHC RBC AUTO-ENTMCNC: 33.6 G/DL (ref 31–37)
MCV RBC AUTO: 80.3 FL (ref 80–99)
MONOCYTES # BLD AUTO: 0.86 X10(3) UL (ref 0.1–1)
MONOCYTES NFR BLD AUTO: 9.8 %
NEUTROPHIL ABS PRELIM: 5.84 X10 (3) UL (ref 1.3–6.7)
NEUTROPHILS # BLD AUTO: 5.84 X10(3) UL (ref 1.3–6.7)
NEUTROPHILS NFR BLD AUTO: 66.4 %
OSMOLALITY SERPL CALC.SUM OF ELEC: 297 MOSM/KG (ref 275–295)
PLATELET # BLD AUTO: 149 10(3)UL (ref 150–450)
POTASSIUM SERPL-SCNC: 3.7 MMOL/L (ref 3.6–5.1)
PSA SERPL DL<=0.01 NG/ML-MCNC: 15.4 SECONDS (ref 12.4–14.7)
RBC # BLD AUTO: 4.71 X10(6)UL (ref 4.3–5.7)
RED CELL DISTRIBUTION WIDTH-SD: 40.4 FL (ref 35.1–46.3)
SODIUM SERPL-SCNC: 140 MMOL/L (ref 136–144)
WBC # BLD AUTO: 8.8 X10(3) UL (ref 4–13)

## 2018-07-26 PROCEDURE — 99233 SBSQ HOSP IP/OBS HIGH 50: CPT | Performed by: OTHER

## 2018-07-26 PROCEDURE — 99232 SBSQ HOSP IP/OBS MODERATE 35: CPT | Performed by: HOSPITALIST

## 2018-07-26 RX ORDER — MIDODRINE HYDROCHLORIDE 2.5 MG/1
2.5 TABLET ORAL
Status: DISCONTINUED | OUTPATIENT
Start: 2018-07-26 | End: 2018-07-30

## 2018-07-26 RX ORDER — POTASSIUM CHLORIDE 20 MEQ/1
40 TABLET, EXTENDED RELEASE ORAL ONCE
Status: COMPLETED | OUTPATIENT
Start: 2018-07-26 | End: 2018-07-26

## 2018-07-26 NOTE — OCCUPATIONAL THERAPY NOTE
OCCUPATIONAL THERAPY TREATMENT NOTE - INPATIENT     Room Number: 6500/2574-G  Session: 1   Number of Visits to Meet Established Goals: 8    Presenting Problem: L CVA    History related to current admission: Pt admitted 7/24/2018 from [facility] for the patient currently need…  -   Putting on and taking off regular lower body clothing?: A Lot  -   Bathing (including washing, rinsing, drying)?: A Lot  -   Toileting, which includes using toilet, bedpan or urinal? : A Lot  -   Putting on and taking off r Pt currently requires SBA for bed mobility, min (A) for functional transfers, min (A) for upper body ADL, and mod (A) for lower body ADL. Recommend skilled OT in the [facility] hospital setting to increase independence with ADL/functional transfers.      Results

## 2018-07-26 NOTE — PROGRESS NOTES
2000- received pt alert and orientated. Vss. Pt has a thick accent, speech may be slightly slurred. Weakness noted on right side, both extremities. Pt has a left bka, but is strong.  Orthostatic bp's done, pt had no c/o dizziness while sitting up or standin

## 2018-07-26 NOTE — CM/SW NOTE
Maxx Packer from [facility] called to say that they are not able to provide any [facility] care for pt for acute rehab at this time.

## 2018-07-26 NOTE — CM/SW NOTE
KELSEY received call from [facility] with Isis Munoz requesting updated PT/OT/rehab consult. They are considering patient for acute rehab as dereje pending and need to review. Updates sent, KELSEY/ASHLEY to follow. Lisa's ph#[phone].

## 2018-07-26 NOTE — PHYSICAL THERAPY NOTE
PHYSICAL THERAPY TREATMENT NOTE - INPATIENT    Room Number: 0312/8495-M     Session: 1  Number of Visits to Meet Established Goals: 5    Presenting Problem: acute CVA   History related to current admission: Pt is a 58 y.o.  Male admitted to [facility] from lying on back to sitting on the side of the bed?: A Little   How much help from another person does the patient currently need. ..   -   Moving to and from a bed to a chair (including a wheelchair)?: A Lot   -   Need to walk in hospital room?: A Lot limited endurance, R UE/LE strength deficits, R UE/LE coordination impairments, and balance impairments. These impairments and comorbidities manifest themselves as functional limitations in independent bed mobility, transfers, and gait.  The patient is [facility]

## 2018-07-26 NOTE — SLP NOTE
ADULT SWALLOWING EVALUATION    ASSESSMENT    ASSESSMENT/OVERALL IMPRESSION:  Patient seen for swallowing assessment per stroke protocol. Patient alert and wife at bedside. Both contributed to history.   Patient with history of CVA 7-8 years ago with dysar time  Treatment Plan/Recommendations: Dysphagia therapy;Communication evaluation  Discharge Recommendations/Plan: Acute rehabilitation    HISTORY   MEDICAL HISTORY  Reason for Referral: Stroke protocol    Problem List  Active Problems:    CVA (cerebral vas facial  Tone: Reduced right facial  Range of Motion: Reduced right facial  Rate of Motion: Reduced    Voice Quality: Clear  Respiratory Status: Unlabored  Consistencies Trialed: Thin liquids; Hard solid  Method of Presentation: Self presentation;Cup;Straw;S

## 2018-07-26 NOTE — PROGRESS NOTES
[facility]  Progress Note     Cesilia Bowers Patient Status:  Inpatient    1955 MRN [id number]   [facility] 6NE-A Attending Sidra Peña MD   Hosp Day # 2 PCP Joanna Mcgee MD     Chief Complaint: CVA    S: Patient is doing w --   7.2   --     < > = values in this interval not displayed. Recent Labs   Lab  07/20/18   2348  07/24/18 2048   PTP  14.9*  15.4*   INR  1.2  1.17*       No results for input(s): TROP, CK in the last 168 hours.          Imaging: Imaging data re

## 2018-07-26 NOTE — PROGRESS NOTES
[employer]  Neurocritical Care       Subjective: Easton Joaquin is a(n) 58year old male with left pontine stroke with b/l vert stenosis and mild dysarthria.     Review of Systems    Constitutional:    Denies unusual weight loss or weigh symmetric. 5-/5 on the right, left side is 5/5  Sensory: Normal and symmetric to light touch. Cerebellar: Finger-nose-finger intact b/l. Gait: Deferred.     Diagnostics:   Ct Brain Or Head ([id number])    Result Date: 7/23/2018  PROCEDURE: CT BRAIN OR HEAD (C on recent MRI study not well seen on current study in June with artifact. 2. Chronic sinusitis 3. Age related atrophy with mild chronic white matter ischemic changes consist with age. No additional/new acute abnormality demonstrated.  4. Moderate intracrani brainstem at the pontomedullary junction corresponding to the recent MRI findings of a subacute area of focal infarction. No surrounding mass effect or edema. No additional sites of infarct are noted. There is no hemorrhage.      Dictated by (CST): Marycarmen Newby, visible lesion. SINUSES: Limited views demonstrate scattered mucosal thickening of the ethmoid air cells and maxillary sinuses. There is trace mucosal thickening of the frontoethmoidal recesses bilaterally. ORBITS: Limited views are grossly unremarkable. space-occupying lesion, mass effect, or shift of midline structures. The gray-white matter junction is preserved and bilaterally symmetric in appearance.   Scattered periventricular and subcortical white matter hypodensities are present, consistent with chr [facility], MRI BRAIN (KPA=[id number]), 7/21/2018, 13:35. [facility], MRA CAROTIDS (W+WO) (FHN=[id number]), 7/21/2018, 13:35. [facility], MRI BRAIN (W+WO) (XOF=[id number]), 6/27/2018, 6:12. INDICATIONS: Focal psychomotor deficit. and maxillary sinuses is demonstrated. CONCLUSION:  1. Multifocal high-grade stenoses of the vertebral arteries, without evidence of occlusion, likely contributory to vertebrobasilar insufficiency.   2. Flow is not convincingly identified in the anter significant stenosis or dissection. COMMON CAROTID: No hemodynamically significant stenosis or dissection. VERTEBRAL: There is a high-grade 70-75% stenosis of the distal V4 segment. Additional less severe stenoses are present more proximally.   LEFT INTER FINDINGS:  CEREBRUM: There is a punctate acute to subacute infarction in the left posterior mesial temporal lobe (series 7 image 45). Scattered periventricular and subcortical white matter T2/FLAIR hyperintense foci are present.  There is generalized atroph aqueduct and 4th ventricle. The cerebellar tonsils are not low lying. There is a area of acute ischemia/infarction with water restriction measuring 6 x 4 mm in the anterior left pontomedullary junction.   There is a smaller 2 x 2 mm focus of acute ischemi 6/27/2018  PROCEDURE: MRI BRAIN (W+WO) (CPT=70553)  [facility] [address]  [id number] 6/27/2018, 6:46. [address] , 7/09/2011, 22:10.   [facility], CT BR leftward nasal septal deviation and spur formation. ORBITS: Limited views are grossly unremarkable. OTHER: The flow voids of the major intracranial vessels are visualized.  There is long segment severe luminal narrowing of the distal V4 segment of the lef systolic: 28.9. ICA End diastolic: 06.7. ECA Peak systolic:  210.0. ICA/VCCA: 1.5. VERTEBRALS: Patent with antegrade flow bilaterally. OTHER FINDINGS: Mild mixed plaque in both carotid bulbs.  Spectral Doppler US Thresholds (Reference: Chrystal Chen, visible aneurysm or vascular malformation. VERTEBRAL ARTERIES: RIGHT:  High-grade narrowing of the distal right vertebral artery with near-complete  occlusion at the level of the basilar artery.  LEFT:  There is high-grade stenosis of the mid left vertebra used. Evaluation of internal carotid artery stenosis is based on NASCET Criteria. FINDINGS: CAROTID ARTERIES: RIGHT COMMON CAROTID:  No hemodynamically significant stenosis or dissection.   RIGHT INTERNAL CAROTID: Severe atherosclerotic calcification at t osteophytes seen within the cervical vertebral bodies. There is osteopenia. CONCLUSION:   Severe atherosclerotic calcification at the bilateral carotid siphons. 75% narrowing of the left internal carotid artery at the [facility] segment.   Greater than 90 Subcutaneous Nightly   Heparin Sodium (Porcine) 5000 UNIT/ML injection 5,000 Units 5,000 Units Subcutaneous 2 times per day   0.9%  NaCl infusion  Intravenous Continuous   acetaminophen (TYLENOL) tab 650 mg 650 mg Oral Q4H PRN   Or      acetaminophen (TYLE UNIT/ML flexpen 1-5 Units 1-5 Units Subcutaneous 4 times per day       Assesment/Plan:   Active Problems:    CVA (cerebral vascular accident) [facility])    Coronary artery disease involving native heart without angina pectoris    Positive cardiac stress test [place] no     Goals of the Day: -180  Transfer to floor today. Thank you for allowing me to participate in the care of this patient.      Ofe Solis MD  Medical Director - Stroke and Neurocritical Care  [facility] -

## 2018-07-27 ENCOUNTER — APPOINTMENT (OUTPATIENT)
Dept: INTERVENTIONAL RADIOLOGY/VASCULAR | Facility: HOSPITAL | Age: 63
DRG: 065 | End: 2018-07-27
Attending: INTERNAL MEDICINE

## 2018-07-27 LAB
ANION GAP SERPL CALC-SCNC: 7 MMOL/L (ref 0–18)
ATRIAL RATE: 72 BPM
BASOPHILS # BLD AUTO: 0.03 X10(3) UL (ref 0–0.1)
BASOPHILS NFR BLD AUTO: 0.4 %
BUN BLD-MCNC: 21 MG/DL (ref 8–20)
BUN/CREAT SERPL: 20 (ref 10–20)
CALCIUM BLD-MCNC: 8.5 MG/DL (ref 8.3–10.3)
CHLORIDE SERPL-SCNC: 108 MMOL/L (ref 101–111)
CO2 SERPL-SCNC: 26 MMOL/L (ref 22–32)
CREAT BLD-MCNC: 1.05 MG/DL (ref 0.7–1.3)
EOSINOPHIL # BLD AUTO: 0.2 X10(3) UL (ref 0–0.3)
EOSINOPHIL NFR BLD AUTO: 3 %
ERYTHROCYTE [DISTWIDTH] IN BLOOD BY AUTOMATED COUNT: 13.6 % (ref 11.5–16)
GLUCOSE BLD-MCNC: 126 MG/DL (ref 65–99)
GLUCOSE BLD-MCNC: 129 MG/DL (ref 65–99)
GLUCOSE BLD-MCNC: 145 MG/DL (ref 70–99)
GLUCOSE BLD-MCNC: 219 MG/DL (ref 65–99)
GLUCOSE BLD-MCNC: 254 MG/DL (ref 65–99)
HCT VFR BLD AUTO: 35.6 % (ref 37–53)
HGB BLD-MCNC: 12 G/DL (ref 13–17)
IMMATURE GRANULOCYTE COUNT: 0.03 X10(3) UL (ref 0–1)
IMMATURE GRANULOCYTE RATIO %: 0.4 %
LYMPHOCYTES # BLD AUTO: 1.68 X10(3) UL (ref 0.9–4)
LYMPHOCYTES NFR BLD AUTO: 25.1 %
MCH RBC QN AUTO: 27.2 PG (ref 27–33.2)
MCHC RBC AUTO-ENTMCNC: 33.7 G/DL (ref 31–37)
MCV RBC AUTO: 80.7 FL (ref 80–99)
MONOCYTES # BLD AUTO: 0.62 X10(3) UL (ref 0.1–1)
MONOCYTES NFR BLD AUTO: 9.3 %
NEUTROPHIL ABS PRELIM: 4.12 X10 (3) UL (ref 1.3–6.7)
NEUTROPHILS # BLD AUTO: 4.12 X10(3) UL (ref 1.3–6.7)
NEUTROPHILS NFR BLD AUTO: 61.8 %
OSMOLALITY SERPL CALC.SUM OF ELEC: 298 MOSM/KG (ref 275–295)
P AXIS: 44 DEGREES
P-R INTERVAL: 172 MS
PLATELET # BLD AUTO: 108 10(3)UL (ref 150–450)
POTASSIUM SERPL-SCNC: 3.9 MMOL/L (ref 3.6–5.1)
POTASSIUM SERPL-SCNC: 3.9 MMOL/L (ref 3.6–5.1)
Q-T INTERVAL: 394 MS
QRS DURATION: 86 MS
QTC CALCULATION (BEZET): 431 MS
R AXIS: 60 DEGREES
RBC # BLD AUTO: 4.41 X10(6)UL (ref 4.3–5.7)
RED CELL DISTRIBUTION WIDTH-SD: 39.9 FL (ref 35.1–46.3)
SODIUM SERPL-SCNC: 141 MMOL/L (ref 136–144)
T AXIS: 235 DEGREES
VENTRICULAR RATE: 72 BPM
WBC # BLD AUTO: 6.7 X10(3) UL (ref 4–13)

## 2018-07-27 PROCEDURE — 99233 SBSQ HOSP IP/OBS HIGH 50: CPT | Performed by: HOSPITALIST

## 2018-07-27 PROCEDURE — 4A023N7 MEASUREMENT OF CARDIAC SAMPLING AND PRESSURE, LEFT HEART, PERCUTANEOUS APPROACH: ICD-10-PCS | Performed by: INTERNAL MEDICINE

## 2018-07-27 PROCEDURE — 99233 SBSQ HOSP IP/OBS HIGH 50: CPT | Performed by: OTHER

## 2018-07-27 PROCEDURE — B2111ZZ FLUOROSCOPY OF MULTIPLE CORONARY ARTERIES USING LOW OSMOLAR CONTRAST: ICD-10-PCS | Performed by: INTERNAL MEDICINE

## 2018-07-27 RX ORDER — MIDAZOLAM HYDROCHLORIDE 1 MG/ML
INJECTION INTRAMUSCULAR; INTRAVENOUS
Status: COMPLETED
Start: 2018-07-27 | End: 2018-07-27

## 2018-07-27 RX ORDER — LIDOCAINE HYDROCHLORIDE 10 MG/ML
INJECTION, SOLUTION EPIDURAL; INFILTRATION; INTRACAUDAL; PERINEURAL
Status: COMPLETED
Start: 2018-07-27 | End: 2018-07-27

## 2018-07-27 RX ORDER — SODIUM CHLORIDE 9 MG/ML
INJECTION, SOLUTION INTRAVENOUS CONTINUOUS
Status: ACTIVE | OUTPATIENT
Start: 2018-07-27 | End: 2018-07-27

## 2018-07-27 RX ORDER — SODIUM CHLORIDE 9 MG/ML
INJECTION, SOLUTION INTRAVENOUS CONTINUOUS
Status: ACTIVE | OUTPATIENT
Start: 2018-07-27 | End: 2018-07-29

## 2018-07-27 RX ORDER — HEPARIN SODIUM 5000 [USP'U]/ML
INJECTION, SOLUTION INTRAVENOUS; SUBCUTANEOUS
Status: COMPLETED
Start: 2018-07-27 | End: 2018-07-27

## 2018-07-27 NOTE — PROGRESS NOTES
[employer]  Neurocritical Care       Subjective: Rosemary Pressley is a(n) 58year old male with left pontine stroke with b/l vert stenosis and mild dysarthria.   7/27/2018 - had angio today severe cad will require CABG/SAVR, right sided we Shoulder shrug is normal and symmetrical.   #12:Tongue is midline. Power of tongue normal.          Motor: Tone normal and symmetric. 5-/5 on the right, left side is 5/5  Sensory: Normal and symmetric to light touch.    Cerebellar: Finger-nose-finger intact CONCLUSION:  1. Small area of possible low attenuation at the left jose l consistent with small area of acute infarction seen on recent MRI study not well seen on current study in June with artifact. 2. Chronic sinusitis 3.  Age related atrophy with mild OTHER: Negative. CONCLUSION:  1. There is now evidence of a small rounded area of low density in the left aspect of the brainstem at the pontomedullary junction corresponding to the recent MRI findings of a subacute area of focal infarction.  No surro infarction is seen. There is commensurate atrophy. CALVARIUM: There is no apparent depressed fracture, mass, or other significant visible lesion.   SINUSES: Limited views demonstrate scattered mucosal thickening of the ethmoid air cells and maxillary sinus fluid collection. CEREBRUM: No acute intraparenchymal hemorrhage, edema, or cortical sulcal effacement is apparent. There is no space-occupying lesion, mass effect, or shift of midline structures.  The gray-white matter junction is preserved and bilaterally BRAIN HEAD WO CONTRAST, 7/09/2011, 13:12. [facility], CT BRAIN OR HEAD (CPT=[id number]), 7/20/2018, 23:46. [facility], MRI BRAIN (KZS=[id number]), 7/21/2018, 13:35.   [facility], MRA CAROTIDS (W+WO) (QPI=[id number]), 7/21/ significant stenosis. ANEURYSMS: None. VASCULAR MALFORMATIONS: None. OTHER: Scattered mucosal thickening of the ethmoid air cells and maxillary sinuses is demonstrated.        CONCLUSION:  1. Multifocal high-grade stenoses of the vertebral arteries, withou compatible with atherosclerotic plaque. No hemodynamically significant stenosis or dissection. EXTERNAL CAROTID: No hemodynamically significant stenosis or dissection. COMMON CAROTID: No hemodynamically significant stenosis or dissection.   VERTEBRAL: The imaging planes and parameters were utilized for visualization of suspected pathology.   Images were performed without contrast.  FINDINGS:  CEREBRUM: There is a punctate acute to subacute infarction in the left posterior mesial temporal lobe (series 7 image intravenously. FINDINGS:  INTRACRANIAL:  Sagittal midline images reveal a normal corpus callosum, optic chiasm, pituitary gland aqueduct and 4th ventricle. The cerebellar tonsils are not low lying.   There is a area of acute ischemia/infarction with water 7/25/2018 at 10:51     Approved by: Juan Pablo Lee MD            Mri Brain (w+wo) (UMU=[id number])    Result Date: 6/27/2018  PROCEDURE: MRI BRAIN (W+WO) (DRG=[id number])  COMPARISON: [address] [facility] (YSQ=[id number]), 6/2 air cells bilaterally. There is mild, diffuse mucosal thickening of the maxillary sinuses bilaterally. There is leftward nasal septal deviation and spur formation. ORBITS: Limited views are grossly unremarkable.    OTHER: The flow voids of the major intracr diastolic: 92.1. ECA Peak systolic:  17.8. ICA/VCCA: 1.1. LEFT CAROTID:   CCA Peak systolic: 18.0. ICA Peak systolic: 48.5. ICA End diastolic: 46.0. ECA Peak systolic:  562.9. ICA/VCCA: 1.5.    VERTEBRALS: Patent with antegrade flow bilaterall significant stenosis. No visible aneurysm or vascular malformation. POSTERIOR CEREBRALS: No significant stenosis. No visible aneurysm or vascular malformation.   VERTEBRAL ARTERIES: RIGHT:  High-grade narrowing of the distal right vertebral artery with the mA and/or kV was done based on the patient's size. Use of iterative reconstruction technique for dose reduction was used. Evaluation of internal carotid artery stenosis is based on NASCET Criteria.    FINDINGS: CAROTID ARTERIES: RIGHT COMMON CAROTID:  N in short axis. There are hypertrophic changes of the uncovertebral and facet joints bilaterally. Small anterior endplate osteophytes seen within the cervical vertebral bodies. There is osteopenia.       CONCLUSION:   Severe atherosclerotic calcification at --   22   --    --    BILT  0.5   --   0.7   --    --    TP  7.0   --   7.2   --    --     < > = values in this interval not displayed.        Medications:       Current Facility-Administered Medications:  0.9%  NaCl infusion  Intravenous Continuous   0.9 15 g Oral Q15 Min PRN   Or      Glucose-Vitamin C (DEX-4) 4-0.006 g chewable tab 4 tablet 4 tablet Oral Q15 Min PRN   Or      dextrose 50 % injection 50 mL 50 mL Intravenous Q15 Min PRN   Or      glucose (DEX4) oral liquid 30 g 30 g Oral Q15 Min PRN   Or Regimen      Heme/ID:  · WBC 6.7  · Monitor H/H with goal hemoglobin more than 7gm/dl  Endocrine:  · Hyperglycemia protocol if req  Skin:  · Monitor for skin breakdown.   DVT Prophylaxis:  · SCD’s  · Heparin 5000U SQ BID     ABCDEF:  A: Pain score 1/10, Med

## 2018-07-27 NOTE — PROGRESS NOTES
Seen and examined earlier today. Reviewed in detail with patient and his wife. Needs cath -- reviewed procedure with patient and his wife -- discussed R/B/A - including no intervention. Both appear to have a nice understanding.     Prelim angio:    Sever

## 2018-07-27 NOTE — PROGRESS NOTES
[facility]  Progress Note     Onetha Ast Patient Status:  Inpatient    1955 MRN [id number]   [facility] 6NE-A Attending Patrecia Cooks, MD   Hosp Day # 3 PCP Sam Bear MD     Chief Complaint: CVA    S: Patient feels well 22   --   18   --    --    ALT  19 -- 22   --    --    BILT  0.5   --   0.7   --    --    TP  7.0   --   7.2   --    --     < > = values in this interval not displayed.        Recent Labs   Lab  07/20/18   2348  07/24/18 2048  07/26/18   2305   PTP this point Mr. Emile Walden is expected to be discharge to: acute rehab    Plan of care discussed with patient or family at bedside.     Marycarmen Nair MD

## 2018-07-27 NOTE — OCCUPATIONAL THERAPY NOTE
OCCUPATIONAL THERAPY TREATMENT NOTE - INPATIENT     Room Number: 4001/8020-L  Session: 2  Number of Visits to Meet Established Goals: 8    Presenting Problem: L CVA    History related to current admission: Pt admitted 7/24/2018 from [facility] for C Putting on and taking off regular lower body clothing?: A Lot  -   Bathing (including washing, rinsing, drying)?: A Lot  -   Toileting, which includes using toilet, bedpan or urinal? : A Lot  -   Putting on and taking off regular upper body clothing?: A mobility, decreased R UE ROM, decreased R hand coordination, and limited use of adaptive techniques. Decreased endurance and activity tolerance impact pt completion of ADLs, IADLs, and community re-entry.  Decreased functional mobility impacts pt ability to

## 2018-07-27 NOTE — PROGRESS NOTES
07/27/18 1414   Clinical Encounter Type   Visited With Patient and family together   Routine Visit Follow-up   Sacramental Encounters   Sacrament of Sick-Anointing Anointed   The patient was seen by Antonieta Mabry.  Received prayer, Scripture,

## 2018-07-27 NOTE — PROGRESS NOTES
07/27/18 1043   Clinical Encounter Type   Visited With Patient   Routine Visit (Responded to the consult)   Continue Visiting (Encouraged patient to call  as needed)   Patient's Supportive Strategies/Resources  offered emotional and spir

## 2018-07-27 NOTE — PLAN OF CARE
7/26 1930: Pt received AOx4, sitting up in the chair. Room air. SR. IVF NS at 100 cc/hr. RFA IV site. Denies pain. 2100: BS-271. Given Insulin Levemir qHS but pt refused Insulin Novolog sliding scale coverage.  Pt will have heart cath in am.  7/27 0000: EK

## 2018-07-27 NOTE — CONSULTS
[facility]    Report of Consultation    Vanderbilt Lópezcyndi Patient Status:  Inpatient    1955 MRN [id number]   [facility] 6NE-A Attending Pernell Castleman, MD   [facility] Day # 2 PCP Rozina Gutierrez MD     Date of Admission:  2018  Marty 650 mg, Rectal, Q4H PRN  •  morphINE sulfate (PF) 4 MG/ML injection 1 mg, 1 mg, Intravenous, Q2H PRN **OR** morphINE sulfate (PF) 4 MG/ML injection 2 mg, 2 mg, Intravenous, Q2H PRN  •  Labetalol HCl (TRANDATE) injection 10 mg, 10 mg, Intravenous, Q10 Min P pulse 68, temperature 97.8 °F (36.6 °C), temperature source Temporal, resp. rate 20, weight 194 lb 10.7 oz (88.3 kg), SpO2 98 %.   Temp (24hrs), Av.1 °F (36.7 °C), Min:97.8 °F (36.6 °C), Max:98.2 °F (36.8 °C)    Wt Readings from Last 3 Encounters:   and PVOD with prior left BKA. Recurrent syncope in the setting of severe aortic stenosis indicates an extremely high risk of cardiac morbidity/mortality.     He will need valve replacement yet unfortunately his acute stroke will effect the timing and mod

## 2018-07-27 NOTE — CM/SW NOTE
Pt had cath today and pt has severe MV/CAD. Will need CV surgery - neuro indicates pt need to wait 2 weeks from stroke before proceeding with CV surgery.   Pt is being considered for acute rehab- need input from cards about whether they will ok pt to go to

## 2018-07-27 NOTE — PROCEDURES
[facility]    PATIENT'S NAME: Luana London   ATTENDING PHYSICIAN: Geronimo Sears M.D. OPERATING PHYSICIAN: Luz Green M.D.    PATIENT ACCOUNT#:   [de-identified]    LOCATION:  [address] [place] [address]  MEDICAL RECORD #:   [id number]       DATE OF BIRTH: atherosclerosis throughout its course. There was 1 major obtuse marginal vessel and multiple angiographically significant stenoses. The distal circumflex terminated in a small left posterolateral; this was diffusely diseased. This was not bypassable.

## 2018-07-27 NOTE — CM/SW NOTE
Wife also asked that referral be sent to [facility] for acute rehab. ECIN sent.     Karla Branch, 07/27/18, 4:14 PM

## 2018-07-27 NOTE — CM/SW NOTE
Lisa from [facility] will meet with patient and wife this afternoon. They would need statement from Cardiologist that pt is ok to due acute rehab. They will still need to get patient financially approved also.     Karla Branch, 07/27/18, 4

## 2018-07-27 NOTE — PHYSICAL THERAPY NOTE
PHYSICAL THERAPY TREATMENT NOTE - INPATIENT    Room Number: 7811/9881-B     Session: 2  Number of Visits to Meet Established Goals: 5    Presenting Problem: acute CVA   History related to current admission: Pt is a 58 y.o.  Male admitted to [facility] commode, etc.): A Little   -   Moving from lying on back to sitting on the side of the bed?: A Little   How much help from another person does the patient currently need. ..   -   Moving to and from a bed to a chair (including a wheelchair)?: A Little   - mobility, transfers, and gait. The patient is below baseline and would benefit from skilled inpatient PT to address the above deficits to assist patient in returning to prior to level of function.  Recommend Pt D/C AR in order to continue to address impairm

## 2018-07-28 LAB
GLUCOSE BLD-MCNC: 119 MG/DL (ref 65–99)
GLUCOSE BLD-MCNC: 162 MG/DL (ref 65–99)
GLUCOSE BLD-MCNC: 214 MG/DL (ref 65–99)
GLUCOSE BLD-MCNC: 273 MG/DL (ref 65–99)

## 2018-07-28 PROCEDURE — 99232 SBSQ HOSP IP/OBS MODERATE 35: CPT | Performed by: HOSPITALIST

## 2018-07-28 PROCEDURE — 99232 SBSQ HOSP IP/OBS MODERATE 35: CPT | Performed by: OTHER

## 2018-07-28 NOTE — PLAN OF CARE
Received pt from CCU @ 5pm.   Oriented to room. Pt is a/o x 4, follows all commands. Slightly slurred speech. RUE weakness. Left AKA. palpable pedal pulse, right foot. Denies pain. IVF per order. Will monitor.

## 2018-07-28 NOTE — PROGRESS NOTES
[facility] Neurology Progress Note    Cesilia Bowers Patient Status:  Inpatient    1955 MRN [id number]   [facility] 7NE-A Attending Sidra Peña MD   [facility] Day # 4 PCP Joanna Mcgee MD     Subjective:  Cesilia Bowers is a(n) absent  Gait:deferred    Labs:    Lab Results  Component Value Date   PGLU 119 07/28/2018     LDL 60  HDL 26  A1C 7.0    Imaging:  No new imaging    MRI Brain 7/25/18 - left pontine stroke  CTA H+ N 7/24/18 -   Moderate grade narrowing of the left Yana s above for details      Assessment:  Left pontine stroke  Vertebrobasilar insufficiency- bilateral vertebral stenosis  HTN, HL  Severe CAD    Recommendations:  Continue ASA and Plavix  Maintain SBP >110  Continue midodrine 2.5mg BID  Continue atorvastatin,

## 2018-07-28 NOTE — PROGRESS NOTES
· [facility] Cardiology Progress Note     Subjective:  Walked in halls, speech is improving, still with weakness right hand but also improving.   No chest pain or dyspnea    Objective:  117/68  Afebrile  SR  I/O incomplete   No new labs    Cardiac: S1 S2

## 2018-07-28 NOTE — PROGRESS NOTES
[facility]  Progress Note     Lakisha Gordon Patient Status:  Inpatient    1955 MRN [id number]   [facility] 6NE-A Attending Oumou Rosales MD   Hosp Day # 4 PCP Daniel Keen MD     Chief Complaint: CVA    S: Patient is doing w 1.17*       No results for input(s): TROP, CK in the last 168 hours. Imaging: Imaging data reviewed in Epic.     Medications:   • Insulin Aspart Pen  1-5 Units Subcutaneous TID CC and HS   • Midodrine HCl  2.5 mg Oral BID AC   • insulin detemir  25

## 2018-07-29 LAB
GLUCOSE BLD-MCNC: 130 MG/DL (ref 65–99)
GLUCOSE BLD-MCNC: 202 MG/DL (ref 65–99)
GLUCOSE BLD-MCNC: 229 MG/DL (ref 65–99)
GLUCOSE BLD-MCNC: 285 MG/DL (ref 65–99)

## 2018-07-29 PROCEDURE — 99232 SBSQ HOSP IP/OBS MODERATE 35: CPT | Performed by: HOSPITALIST

## 2018-07-29 PROCEDURE — 99232 SBSQ HOSP IP/OBS MODERATE 35: CPT | Performed by: NURSE PRACTITIONER

## 2018-07-29 NOTE — PLAN OF CARE
Patient awake, alert and oriented x4  Speech slurred, RUE 4/5 strength, RLE 4/5 strength, RUE pronator drift, sensation intact  NIH- 3  No acute neurological changes from baseline   Telemetry, Sinus Rhythm   Denies pain/discomfort  Ambulating in hallway wi

## 2018-07-29 NOTE — PLAN OF CARE
Assumed care for pt at 0300. Pt AOx4. Family at bedisde. RA.  .  NSR.  0.9NS infusing at 100cc/h. Neuro Q4. Await approval from [facility]. Will continue to monitor.

## 2018-07-29 NOTE — PROGRESS NOTES
[facility]  Progress Note     Easton Joaquin Patient Status:  Inpatient    1955 MRN [id number]   [facility] 6NE-A Attending Olga Manzo MD   Hosp Day # 5 PCP Vanda Guadalupe MD     Chief Complaint: CVA    S: Patient has no com No results for input(s): TROP, CK in the last 168 hours. Imaging: Imaging data reviewed in Epic.     Medications:   • Insulin Aspart Pen  1-5 Units Subcutaneous TID CC and HS   • Midodrine HCl  2.5 mg Oral BID AC   • insulin detemir  25 Units

## 2018-07-29 NOTE — PROGRESS NOTES
[facility] Neurology Progress Note    Gareth Han Patient Status:  Inpatient    1955 MRN [id number]   [facility] 7NE-A Attending Daniel Rush MD   [facility] Day # 5 PCP Raiza Brooks MD     Subjective:  Gareth Han is a(n) Imaging:  No new imaging    Impression:  Left pontine stroke  Vertebrobasilar insufficiency  CAD  Positive cardiac stress test    Plan:  Stroke work up completed, maximum medical management   -  mg + Plavix 75 mg daily   - Atorvastatin 80 mg q h

## 2018-07-29 NOTE — PLAN OF CARE
Assumed care at 0700.    Patient awake, alert and oriented x4  Speech slurred  Right arm weakness, pronator drift  NIH-3  No acute neurological changes   SBP remains within targeted goal  Midodrine as scheduled, NS at 100mL/hr  Denies pain/discomfort  Telem

## 2018-07-29 NOTE — PROGRESS NOTES
[facility]  Progress Note    Christian Apodaca Patient Status:  Inpatient    1955 MRN [id number]   [facility] 7NE-A Attending Zhane Duffy MD   [facility] Day # 5 PCP Anca Brumfield MD       Assessment and Plan:  Patient Active Problem Relda Fraction °F (36.8 °C)      Intake/Output:    Intake/Output Summary (Last 24 hours) at 07/29/18 0839  Last data filed at 07/28/18 1930   Gross per 24 hour   Intake              840 ml   Output             1200 ml   Net             -360 ml       Wt Readings from Last (DULCOLAX) rectal suppository 10 mg 10 mg Rectal Daily PRN   FLEET ENEMA (FLEET) 7-19 GM/118ML enema 133 mL 1 enema Rectal Once PRN   ondansetron HCl (ZOFRAN) injection 4 mg 4 mg Intravenous Q6H PRN   Or      Metoclopramide HCl (REGLAN) injection 10 mg 10

## 2018-07-29 NOTE — PLAN OF CARE
Assumed care at [facility]. Pt A/O x4. RA. NSR on tele. VSS. Neuro checks q4. Slurred speech. Up x1 w/ the walker and prosthetic. Waiting for approval from either [facility] or [facility] Aguirre. Wife at the bedside. Updated on POC. Call light within reach.  Will broderick

## 2018-07-30 VITALS
SYSTOLIC BLOOD PRESSURE: 136 MMHG | TEMPERATURE: 98 F | HEART RATE: 69 BPM | BODY MASS INDEX: 26.23 KG/M2 | RESPIRATION RATE: 18 BRPM | HEIGHT: 72.05 IN | DIASTOLIC BLOOD PRESSURE: 72 MMHG | OXYGEN SATURATION: 97 % | WEIGHT: 193.63 LBS

## 2018-07-30 LAB
GLUCOSE BLD-MCNC: 111 MG/DL (ref 65–99)
GLUCOSE BLD-MCNC: 161 MG/DL (ref 65–99)
GLUCOSE BLD-MCNC: 213 MG/DL (ref 65–99)

## 2018-07-30 PROCEDURE — 99239 HOSP IP/OBS DSCHRG MGMT >30: CPT | Performed by: HOSPITALIST

## 2018-07-30 RX ORDER — MIDODRINE HYDROCHLORIDE 2.5 MG/1
2.5 TABLET ORAL
Qty: 60 TABLET | Refills: 5 | Status: SHIPPED | OUTPATIENT
Start: 2018-07-31 | End: 2019-01-01

## 2018-07-30 RX ORDER — ATORVASTATIN CALCIUM 80 MG/1
80 TABLET, FILM COATED ORAL NIGHTLY
Qty: 30 TABLET | Refills: 3 | Status: SHIPPED | OUTPATIENT
Start: 2018-07-30 | End: 2018-01-01

## 2018-07-30 RX ORDER — CLOPIDOGREL BISULFATE 75 MG/1
75 TABLET ORAL DAILY
Qty: 30 TABLET | Refills: 5 | Status: SHIPPED | OUTPATIENT
Start: 2018-07-31 | End: 2019-01-01

## 2018-07-30 NOTE — CM/SW NOTE
Per Cardiology, patient may discharge to acute rehab. Kelsie Sultana [facility] called, application pending d/t no insurance. Mr Vaughn's wife is concerned his medicaid application will be denied d/t income being too high.  Patient and wife want to discharge home with

## 2018-07-30 NOTE — PROGRESS NOTES
Seen and examined. Discussed in detail with patient and his wife. Reviewed with Nurse Jonah Higgins. Ambulating and working with therapy. No chest pain or syncope.     Afebrile  On low dose Midodrine  119/66  68 regular    Lungs clear  Ht RRR with ESTEPHANIA  abd soft

## 2018-07-30 NOTE — PROGRESS NOTES
[facility]  Progress Note     Rossana Cartagena Patient Status:  Inpatient    1955 MRN [id number]   [facility] 6NE-A Attending Luz Marina Pike MD   Hosp Day # 6 PCP Lili Gray MD     Chief Complaint: CVA    S: Patient is doing w Insulin Aspart Pen  1-5 Units Subcutaneous TID CC and HS   • Midodrine HCl  2.5 mg Oral BID AC   • insulin detemir  25 Units Subcutaneous Nightly   • Heparin Sodium (Porcine)  5,000 Units Subcutaneous 2 times per day   • atorvastatin  80 mg Oral Nightly

## 2018-07-30 NOTE — CM/SW NOTE
[facility] in Residence  [place] #[phone] to see patient at home upon discharge. Dr Jackie Cooper informed of the plan of care. Acute rehab is preferred per MD, but family is concerned about medical bills and wants to go home.      Feroz

## 2018-07-30 NOTE — OCCUPATIONAL THERAPY NOTE
OCCUPATIONAL THERAPY TREATMENT NOTE - INPATIENT     Room Number: 2700/8402-Q  Session: 3   Number of Visits to Meet Established Goals: 8    Presenting Problem: L CVA    History related to current admission: Pt admitted 7/24/2018 from [facility] for another person does the patient currently need…  -   Putting on and taking off regular lower body clothing?: A Lot  -   Bathing (including washing, rinsing, drying)?: A Lot  -   Toileting, which includes using toilet, bedpan or urinal? : A Lot  -   Putting pt can participate in a comprehensive and intensive therapy program.   If pt can not qualify for acute care rehab, pt and his wife prefer to return home with home OT/PT.       OT Discharge Recommendations: Acute rehabilitation  OT Device Recommendations:  (

## 2018-07-30 NOTE — PHYSICAL THERAPY NOTE
PHYSICAL THERAPY TREATMENT NOTE - INPATIENT    Room Number: 2498   Session: 2  Number of Visits to Meet Established Goals: 5    Presenting Problem: acute CVA   History related to current admission: Pt is a 58 y.o.  Male admitted to [facility] 7/21/1 bedclothes, sheets and blankets)?: A Little   -   Sitting down on and standing up from a chair with arms (e.g., wheelchair, bedside commode, etc.): A Little   -   Moving from lying on back to sitting on the side of the bed?: A Little   How much help from a continues to present with the following impairments limited endurance, R UE/LE strength deficits, R UE/LE coordination impairments, and balance impairments.  These impairments and comorbidities manifest themselves as functional limitations in independent be

## 2018-07-30 NOTE — PLAN OF CARE
Assumed care at 1900. Alert and oriented x4. Telemetry monitor reading SR. IVF d/c. No pain. Plan awaiting response from rehab facility. Call light in reach at the bedside. Will continue to monitor.     Diabetes/Glucose Control    • Glucose maintained withi

## 2018-07-30 NOTE — PLAN OF CARE
NURSING DISCHARGE NOTE    Discharged Home via Wheelchair. Accompanied by Family member  Belongings Taken by patient/family.     Received pt a/ox4, RA, NSR on tele at 0700  Pt denies any pain at this time  Neuros Qshift, slight slurred speech, RUE prona

## 2018-07-30 NOTE — SLP NOTE
SPEECH/LANGUAGE/COGNITIVE EVALUATION & DYSPHAGIA FOLLOW UP - INPATIENT    Admission Date: 7/24/2018  Evaluation Date: 07/30/18    Reason for Referral: Stroke protocol    ASSESSMENT & PLAN   ASSESSMENT & IMPRESSION  Patient seen for dysphagia follow up as w Perceptual Dysarthria Evaluation Rating        Perceptual Dysarthria Evaluation Rating: moderate dysarthria       Aphasic Depression Rating Scale Administered: Not applicable  Deficits Identified: Motor speech    Discharge Recommendations/Plan: Acute rehab

## 2018-07-31 NOTE — CM/SW NOTE
07/31/18 0900   Discharge disposition   Expected discharge disposition Home-Health   Name of Facillity/Home Care/Hospice ([address] )   Home services after discharge Skilled home care   Discharge transportation Private car

## 2018-08-01 ENCOUNTER — PATIENT OUTREACH (OUTPATIENT)
Dept: CASE MANAGEMENT | Age: 63
End: 2018-08-01

## 2018-08-02 NOTE — DISCHARGE SUMMARY
[facility] HOSPITALIST  DISCHARGE SUMMARY     Libby Glasgow Patient Status:  Inpatient    1955 MRN [id number]   [facility] 7NE-A Attending No att. providers found   Hosp Day # 6 PCP Porsche Quiroz MD     Date of Admission: 2018  Date o once improved. Brief Synopsis:     The patient was admitted due to his acute CVA. He was seen by neurointervention but no acute intervention was felt to be necessary. He was started on ASA/plavix/statin.   He had steady improvement in his neuro sympto Take 1 tablet (80 mg total) by mouth nightly. Stop taking on:  8/29/2018  Quantity:  30 tablet  Refills:  3     BASAGLAR KWIKPEN 100 UNIT/ML Sopn  Generic drug:  insulin glargine  What changed:  Another medication with the same name was removed.  Continue [id number]  [phone]    Schedule an appointment as soon as possible for a visit      Darien Torres MD  [facility]  [place] [address]  [phone]    In 3 weeks        Vital signs:       Physical Exam:

## 2018-11-05 NOTE — TELEPHONE ENCOUNTER
[facility] called regarding paperwork that they faxed over a week ago. It was a therapy order plan care & certification they need Dr. Shelia Stout to sign, date and fax back as soon as possible.

## 2019-01-01 ENCOUNTER — APPOINTMENT (OUTPATIENT)
Dept: PHYSICAL THERAPY | Facility: HOSPITAL | Age: 64
End: 2019-01-01
Attending: INTERNAL MEDICINE
Payer: MEDICAID

## 2019-01-01 ENCOUNTER — OFFICE VISIT (OUTPATIENT)
Dept: INTERNAL MEDICINE CLINIC | Facility: CLINIC | Age: 64
End: 2019-01-01
Payer: MEDICAID

## 2019-01-01 ENCOUNTER — NURSE ONLY (OUTPATIENT)
Dept: INTERNAL MEDICINE CLINIC | Facility: CLINIC | Age: 64
End: 2019-01-01
Payer: MEDICAID

## 2019-01-01 ENCOUNTER — APPOINTMENT (OUTPATIENT)
Dept: CT IMAGING | Facility: HOSPITAL | Age: 64
DRG: 281 | End: 2019-01-01
Attending: EMERGENCY MEDICINE
Payer: MEDICAID

## 2019-01-01 ENCOUNTER — OFFICE VISIT (OUTPATIENT)
Dept: OCCUPATIONAL MEDICINE | Facility: HOSPITAL | Age: 64
End: 2019-01-01
Attending: INTERNAL MEDICINE
Payer: MEDICAID

## 2019-01-01 ENCOUNTER — HOSPITAL ENCOUNTER (EMERGENCY)
Facility: HOSPITAL | Age: 64
End: 2019-01-01
Attending: EMERGENCY MEDICINE
Payer: MEDICAID

## 2019-01-01 ENCOUNTER — PRIOR ORIGINAL RECORDS (OUTPATIENT)
Dept: OTHER | Age: 64
End: 2019-01-01

## 2019-01-01 ENCOUNTER — TELEPHONE (OUTPATIENT)
Dept: SURGERY | Facility: CLINIC | Age: 64
End: 2019-01-01

## 2019-01-01 ENCOUNTER — HOSPITAL ENCOUNTER (OUTPATIENT)
Dept: CT IMAGING | Facility: HOSPITAL | Age: 64
Discharge: HOME | End: 2019-01-01
Attending: PHYSICIAN ASSISTANT
Payer: MEDICAID

## 2019-01-01 ENCOUNTER — APPOINTMENT (OUTPATIENT)
Dept: ULTRASOUND IMAGING | Facility: HOSPITAL | Age: 64
DRG: 281 | End: 2019-01-01
Attending: INTERNAL MEDICINE
Payer: MEDICAID

## 2019-01-01 ENCOUNTER — APPOINTMENT (OUTPATIENT)
Dept: LAB | Facility: HOSPITAL | Age: 64
End: 2019-01-01
Attending: INTERNAL MEDICINE
Payer: MEDICAID

## 2019-01-01 ENCOUNTER — TELEPHONE (OUTPATIENT)
Dept: CARDIOLOGY | Age: 64
End: 2019-01-01

## 2019-01-01 ENCOUNTER — OFFICE VISIT (OUTPATIENT)
Dept: NEUROLOGY | Facility: CLINIC | Age: 64
End: 2019-01-01
Payer: MEDICAID

## 2019-01-01 ENCOUNTER — APPOINTMENT (OUTPATIENT)
Dept: INTERVENTIONAL RADIOLOGY/VASCULAR | Facility: HOSPITAL | Age: 64
End: 2019-01-01
Attending: INTERNAL MEDICINE
Payer: MEDICAID

## 2019-01-01 ENCOUNTER — TELEPHONE (OUTPATIENT)
Dept: PHYSICAL THERAPY | Facility: HOSPITAL | Age: 64
End: 2019-01-01

## 2019-01-01 ENCOUNTER — MYAURORA ACCOUNT LINK (OUTPATIENT)
Dept: OTHER | Age: 64
End: 2019-01-01

## 2019-01-01 ENCOUNTER — TELEPHONE (OUTPATIENT)
Dept: CARDIOLOGY CLINIC | Facility: HOSPITAL | Age: 64
End: 2019-01-01

## 2019-01-01 ENCOUNTER — HOSPITAL ENCOUNTER (INPATIENT)
Facility: HOSPITAL | Age: 64
LOS: 2 days | Discharge: HOME | DRG: 281 | End: 2019-01-01
Attending: EMERGENCY MEDICINE | Admitting: HOSPITALIST
Payer: MEDICAID

## 2019-01-01 ENCOUNTER — APPOINTMENT (OUTPATIENT)
Dept: GENERAL RADIOLOGY | Facility: HOSPITAL | Age: 64
DRG: 281 | End: 2019-01-01
Payer: MEDICAID

## 2019-01-01 ENCOUNTER — PATIENT OUTREACH (OUTPATIENT)
Dept: CASE MANAGEMENT | Age: 64
End: 2019-01-01

## 2019-01-01 ENCOUNTER — OFFICE VISIT (OUTPATIENT)
Dept: CARDIOLOGY | Age: 64
End: 2019-01-01

## 2019-01-01 ENCOUNTER — TELEPHONE (OUTPATIENT)
Dept: INTERNAL MEDICINE CLINIC | Facility: CLINIC | Age: 64
End: 2019-01-01

## 2019-01-01 ENCOUNTER — HOSPITAL ENCOUNTER (OUTPATIENT)
Dept: INTERVENTIONAL RADIOLOGY/VASCULAR | Facility: HOSPITAL | Age: 64
Discharge: HOME | End: 2019-01-01
Attending: INTERNAL MEDICINE | Admitting: INTERNAL MEDICINE
Payer: MEDICAID

## 2019-01-01 ENCOUNTER — EXTERNAL RECORD (OUTPATIENT)
Dept: HEALTH INFORMATION MANAGEMENT | Facility: OTHER | Age: 64
End: 2019-01-01

## 2019-01-01 ENCOUNTER — TELEPHONE (OUTPATIENT)
Dept: CARDIOLOGY UNIT | Facility: HOSPITAL | Age: 64
End: 2019-01-01

## 2019-01-01 ENCOUNTER — HOSPITAL ENCOUNTER (OUTPATIENT)
Dept: CV DIAGNOSTICS | Facility: HOSPITAL | Age: 64
Discharge: HOME | End: 2019-01-01
Attending: PHYSICIAN ASSISTANT
Payer: MEDICAID

## 2019-01-01 ENCOUNTER — ORDER TRANSCRIPTION (OUTPATIENT)
Dept: PHYSICAL THERAPY | Facility: HOSPITAL | Age: 64
End: 2019-01-01

## 2019-01-01 VITALS — BODY MASS INDEX: 24.92 KG/M2 | WEIGHT: 184 LBS | HEIGHT: 72 IN

## 2019-01-01 VITALS
OXYGEN SATURATION: 99 % | WEIGHT: 190 LBS | RESPIRATION RATE: 18 BRPM | SYSTOLIC BLOOD PRESSURE: 118 MMHG | BODY MASS INDEX: 25.73 KG/M2 | DIASTOLIC BLOOD PRESSURE: 70 MMHG | HEIGHT: 72 IN | HEART RATE: 79 BPM

## 2019-01-01 VITALS
DIASTOLIC BLOOD PRESSURE: 70 MMHG | RESPIRATION RATE: 19 BRPM | SYSTOLIC BLOOD PRESSURE: 130 MMHG | OXYGEN SATURATION: 99 % | HEART RATE: 81 BPM | BODY MASS INDEX: 26 KG/M2 | HEIGHT: 72 IN | TEMPERATURE: 98 F

## 2019-01-01 VITALS
WEIGHT: 188 LBS | BODY MASS INDEX: 25.47 KG/M2 | HEART RATE: 75 BPM | DIASTOLIC BLOOD PRESSURE: 80 MMHG | HEIGHT: 72 IN | SYSTOLIC BLOOD PRESSURE: 126 MMHG | OXYGEN SATURATION: 98 % | RESPIRATION RATE: 22 BRPM

## 2019-01-01 VITALS
OXYGEN SATURATION: 99 % | HEIGHT: 72 IN | SYSTOLIC BLOOD PRESSURE: 120 MMHG | DIASTOLIC BLOOD PRESSURE: 70 MMHG | BODY MASS INDEX: 25 KG/M2 | HEART RATE: 82 BPM | RESPIRATION RATE: 22 BRPM

## 2019-01-01 VITALS
HEART RATE: 76 BPM | SYSTOLIC BLOOD PRESSURE: 111 MMHG | TEMPERATURE: 98 F | OXYGEN SATURATION: 96 % | DIASTOLIC BLOOD PRESSURE: 75 MMHG | RESPIRATION RATE: 18 BRPM | WEIGHT: 181.88 LBS | BODY MASS INDEX: 26.04 KG/M2 | HEIGHT: 70 IN

## 2019-01-01 VITALS
BODY MASS INDEX: 26.6 KG/M2 | SYSTOLIC BLOOD PRESSURE: 130 MMHG | HEIGHT: 71 IN | HEART RATE: 96 BPM | DIASTOLIC BLOOD PRESSURE: 72 MMHG | WEIGHT: 190 LBS

## 2019-01-01 VITALS
OXYGEN SATURATION: 96 % | HEIGHT: 71 IN | HEART RATE: 78 BPM | BODY MASS INDEX: 26.46 KG/M2 | SYSTOLIC BLOOD PRESSURE: 100 MMHG | WEIGHT: 189 LBS | DIASTOLIC BLOOD PRESSURE: 56 MMHG

## 2019-01-01 VITALS
TEMPERATURE: 98 F | SYSTOLIC BLOOD PRESSURE: 122 MMHG | RESPIRATION RATE: 22 BRPM | HEIGHT: 72 IN | OXYGEN SATURATION: 98 % | HEART RATE: 81 BPM | BODY MASS INDEX: 25.87 KG/M2 | WEIGHT: 191 LBS | DIASTOLIC BLOOD PRESSURE: 64 MMHG

## 2019-01-01 VITALS
DIASTOLIC BLOOD PRESSURE: 82 MMHG | SYSTOLIC BLOOD PRESSURE: 119 MMHG | WEIGHT: 181 LBS | OXYGEN SATURATION: 97 % | RESPIRATION RATE: 18 BRPM | BODY MASS INDEX: 26 KG/M2 | HEART RATE: 88 BPM

## 2019-01-01 VITALS
SYSTOLIC BLOOD PRESSURE: 106 MMHG | TEMPERATURE: 98 F | BODY MASS INDEX: 25 KG/M2 | DIASTOLIC BLOOD PRESSURE: 43 MMHG | OXYGEN SATURATION: 81 % | WEIGHT: 184.06 LBS

## 2019-01-01 DIAGNOSIS — R26.9 GAIT ABNORMALITY: ICD-10-CM

## 2019-01-01 DIAGNOSIS — G89.29 CHRONIC PAIN OF RIGHT WRIST: ICD-10-CM

## 2019-01-01 DIAGNOSIS — I25.84 CORONARY ARTERY DISEASE DUE TO CALCIFIED CORONARY LESION: Primary | ICD-10-CM

## 2019-01-01 DIAGNOSIS — M21.371 FOOT DROP, RIGHT: ICD-10-CM

## 2019-01-01 DIAGNOSIS — I95.1 ORTHOSTATIC HYPOTENSION: ICD-10-CM

## 2019-01-01 DIAGNOSIS — I21.4 NON-ST ELEVATION MI (NSTEMI) (HCC): Primary | ICD-10-CM

## 2019-01-01 DIAGNOSIS — I63.9 BRAIN STEM INFARCTION (HCC): ICD-10-CM

## 2019-01-01 DIAGNOSIS — I65.03 STENOSIS OF BOTH VERTEBRAL ARTERIES: ICD-10-CM

## 2019-01-01 DIAGNOSIS — E13.9 DIABETES 1.5, MANAGED AS TYPE 2 (HCC): ICD-10-CM

## 2019-01-01 DIAGNOSIS — I65.21 STENOSIS OF RIGHT CAROTID ARTERY: Primary | ICD-10-CM

## 2019-01-01 DIAGNOSIS — I25.119 CORONARY ARTERY DISEASE INVOLVING NATIVE CORONARY ARTERY OF NATIVE HEART WITH ANGINA PECTORIS (CMD): Primary | ICD-10-CM

## 2019-01-01 DIAGNOSIS — Z89.512 S/P BKA (BELOW KNEE AMPUTATION) UNILATERAL, LEFT (HCC): ICD-10-CM

## 2019-01-01 DIAGNOSIS — M21.371 RIGHT FOOT DROP: Primary | ICD-10-CM

## 2019-01-01 DIAGNOSIS — Z01.00 DIABETIC EYE EXAM (HCC): ICD-10-CM

## 2019-01-01 DIAGNOSIS — G89.29 CHRONIC RIGHT SHOULDER PAIN: ICD-10-CM

## 2019-01-01 DIAGNOSIS — E11.59 TYPE 2 DIABETES MELLITUS WITH OTHER CIRCULATORY COMPLICATION, WITH LONG-TERM CURRENT USE OF INSULIN (HCC): ICD-10-CM

## 2019-01-01 DIAGNOSIS — M25.531 CHRONIC PAIN OF RIGHT WRIST: ICD-10-CM

## 2019-01-01 DIAGNOSIS — R29.898 RIGHT LEG WEAKNESS: ICD-10-CM

## 2019-01-01 DIAGNOSIS — I69.359 CVA, OLD, HEMIPARESIS (HCC): ICD-10-CM

## 2019-01-01 DIAGNOSIS — Z12.5 PROSTATE CANCER SCREENING: ICD-10-CM

## 2019-01-01 DIAGNOSIS — Z79.4 TYPE 2 DIABETES MELLITUS WITH OTHER CIRCULATORY COMPLICATION, WITH LONG-TERM CURRENT USE OF INSULIN (HCC): ICD-10-CM

## 2019-01-01 DIAGNOSIS — M25.511 CHRONIC RIGHT SHOULDER PAIN: ICD-10-CM

## 2019-01-01 DIAGNOSIS — I35.0 NONRHEUMATIC AORTIC VALVE STENOSIS: ICD-10-CM

## 2019-01-01 DIAGNOSIS — E78.5 HYPERLIPIDEMIA LDL GOAL <70: ICD-10-CM

## 2019-01-01 DIAGNOSIS — I73.9 PAD (PERIPHERAL ARTERY DISEASE) (HCC): ICD-10-CM

## 2019-01-01 DIAGNOSIS — T87.9 BKA STUMP COMPLICATION (HCC): Primary | ICD-10-CM

## 2019-01-01 DIAGNOSIS — I65.22: ICD-10-CM

## 2019-01-01 DIAGNOSIS — E11.9 DIABETIC EYE EXAM (HCC): ICD-10-CM

## 2019-01-01 DIAGNOSIS — I25.10 CORONARY ARTERY DISEASE DUE TO CALCIFIED CORONARY LESION: Primary | ICD-10-CM

## 2019-01-01 DIAGNOSIS — R55 POSTURAL DIZZINESS WITH PRESYNCOPE: ICD-10-CM

## 2019-01-01 DIAGNOSIS — I25.110 CORONARY ARTERY DISEASE INVOLVING NATIVE CORONARY ARTERY OF NATIVE HEART WITH UNSTABLE ANGINA PECTORIS (HCC): Primary | ICD-10-CM

## 2019-01-01 DIAGNOSIS — I25.10 CORONARY ARTERY DISEASE DUE TO CALCIFIED CORONARY LESION: ICD-10-CM

## 2019-01-01 DIAGNOSIS — E78.2 MIXED HYPERLIPIDEMIA: ICD-10-CM

## 2019-01-01 DIAGNOSIS — Z00.00 GENERAL MEDICAL EXAM: Primary | ICD-10-CM

## 2019-01-01 DIAGNOSIS — I10 ESSENTIAL HYPERTENSION: ICD-10-CM

## 2019-01-01 DIAGNOSIS — I46.9 CARDIAC ARREST (HCC): Primary | ICD-10-CM

## 2019-01-01 DIAGNOSIS — I06.2 AORTIC VALVE STENOSIS AND INSUFFICIENCY, RHEUMATIC: ICD-10-CM

## 2019-01-01 DIAGNOSIS — I25.84 CORONARY ARTERY DISEASE DUE TO CALCIFIED CORONARY LESION: ICD-10-CM

## 2019-01-01 DIAGNOSIS — R77.8 ELEVATED TROPONIN: ICD-10-CM

## 2019-01-01 DIAGNOSIS — Z12.11 COLON CANCER SCREENING: ICD-10-CM

## 2019-01-01 DIAGNOSIS — I25.10 CAD (CORONARY ARTERY DISEASE): ICD-10-CM

## 2019-01-01 DIAGNOSIS — R42 POSTURAL DIZZINESS WITH PRESYNCOPE: ICD-10-CM

## 2019-01-01 DIAGNOSIS — I25.118 CORONARY ARTERY DISEASE INVOLVING NATIVE CORONARY ARTERY OF NATIVE HEART WITH OTHER FORM OF ANGINA PECTORIS (HCC): ICD-10-CM

## 2019-01-01 DIAGNOSIS — R26.81 UNSTEADY GAIT: Primary | ICD-10-CM

## 2019-01-01 DIAGNOSIS — I25.10 ATHEROSCLEROSIS OF NATIVE CORONARY ARTERY OF NATIVE HEART, ANGINA PRESENCE UNSPECIFIED: ICD-10-CM

## 2019-01-01 DIAGNOSIS — I71.2 ASCENDING AORTIC ANEURYSM (HCC): ICD-10-CM

## 2019-01-01 DIAGNOSIS — M21.371 FOOT DROP, RIGHT: Primary | ICD-10-CM

## 2019-01-01 DIAGNOSIS — R07.9 CHEST PAIN, UNSPECIFIED TYPE: Primary | ICD-10-CM

## 2019-01-01 DIAGNOSIS — I25.10 CORONARY ARTERY DISEASE INVOLVING NATIVE CORONARY ARTERY OF NATIVE HEART WITHOUT ANGINA PECTORIS: ICD-10-CM

## 2019-01-01 DIAGNOSIS — G45.0 VERTEBROBASILAR INSUFFICIENCY: ICD-10-CM

## 2019-01-01 LAB
ALBUMIN SERPL BCP-MCNC: 3.7 G/DL (ref 3.5–4.8)
ALBUMIN/GLOB SERPL: 1.2 {RATIO} (ref 1–2)
ALP SERPL-CCNC: 61 U/L (ref 32–100)
ALT SERPL-CCNC: 13 U/L (ref 17–63)
ANION GAP SERPL CALC-SCNC: 7 MMOL/L (ref 0–18)
AST SERPL-CCNC: 19 U/L (ref 15–41)
BASOPHILS # BLD AUTO: 0.02 X10(3) UL (ref 0–0.2)
BASOPHILS # BLD: 0 K/UL (ref 0–0.2)
BASOPHILS NFR BLD AUTO: 0.3 %
BASOPHILS NFR BLD: 1 %
BILIRUB SERPL-MCNC: 0.9 MG/DL (ref 0.3–1.2)
BUN SERPL-MCNC: 14 MG/DL (ref 8–20)
BUN/CREAT SERPL: 14 (ref 10–20)
CALCIUM SERPL-MCNC: 9.1 MG/DL (ref 8.5–10.5)
CHLORIDE SERPL-SCNC: 105 MMOL/L (ref 95–110)
CHOLEST SERPL-MCNC: 146 MG/DL (ref 110–200)
CHOLESTEROL, TOTAL: 146 MG/DL
CO2 SERPL-SCNC: 26 MMOL/L (ref 22–32)
COMPLEXED PSA SERPL-MCNC: 0.65 NG/ML (ref 0.01–4)
CREAT SERPL-MCNC: 1 MG/DL (ref 0.5–1.5)
DEPRECATED RDW RBC AUTO: 44.3 FL (ref 35.1–46.3)
EOSINOPHIL # BLD AUTO: 0.13 X10(3) UL (ref 0–0.7)
EOSINOPHIL # BLD: 0.3 K/UL (ref 0–0.7)
EOSINOPHIL NFR BLD AUTO: 1.8 %
EOSINOPHIL NFR BLD: 4 %
ERYTHROCYTE [DISTWIDTH] IN BLOOD BY AUTOMATED COUNT: 13.1 % (ref 11–15)
ERYTHROCYTE [DISTWIDTH] IN BLOOD BY AUTOMATED COUNT: 14.6 % (ref 11–15)
EST. AVERAGE GLUCOSE BLD GHB EST-MCNC: 134 MG/DL (ref 68–126)
GLOBULIN PLAS-MCNC: 3.1 G/DL (ref 2.5–3.7)
GLUCOSE SERPL-MCNC: 115 MG/DL (ref 70–99)
HBA1C MFR BLD HPLC: 6.3 % (ref ?–5.7)
HCT VFR BLD AUTO: 20.8 % (ref 39–53)
HCT VFR BLD AUTO: 43 % (ref 41–52)
HDL CHOLESTEROL: 28 MG/DL
HDLC SERPL-MCNC: 28 MG/DL
HGB BLD-MCNC: 14.7 G/DL (ref 13.5–17.5)
HGB BLD-MCNC: 6.5 G/DL (ref 13–17.5)
IMM GRANULOCYTES # BLD AUTO: 0.04 X10(3) UL (ref 0–1)
IMM GRANULOCYTES NFR BLD: 0.5 %
LDL CHOLESTEROL: 94 MG/DL
LDLC SERPL CALC-MCNC: 94 MG/DL (ref 0–99)
LYMPHOCYTES # BLD AUTO: 1.87 X10(3) UL (ref 1–4)
LYMPHOCYTES # BLD: 1.6 K/UL (ref 1–4)
LYMPHOCYTES NFR BLD AUTO: 25.4 %
LYMPHOCYTES NFR BLD: 23 %
MCH RBC QN AUTO: 28.4 PG (ref 27–32)
MCH RBC QN AUTO: 29.3 PG (ref 26–34)
MCHC RBC AUTO-ENTMCNC: 31.3 G/DL (ref 31–37)
MCHC RBC AUTO-ENTMCNC: 34.3 G/DL (ref 32–37)
MCV RBC AUTO: 82.9 FL (ref 80–100)
MCV RBC AUTO: 93.7 FL (ref 80–100)
MONOCYTES # BLD AUTO: 0.55 X10(3) UL (ref 0.1–1)
MONOCYTES # BLD: 0.7 K/UL (ref 0–1)
MONOCYTES NFR BLD AUTO: 7.5 %
MONOCYTES NFR BLD: 10 %
NEUTROPHILS # BLD AUTO: 4.4 K/UL (ref 1.8–7.7)
NEUTROPHILS # BLD AUTO: 4.74 X10 (3) UL (ref 1.5–7.7)
NEUTROPHILS # BLD AUTO: 4.74 X10(3) UL (ref 1.5–7.7)
NEUTROPHILS NFR BLD AUTO: 64.5 %
NEUTROPHILS NFR BLD: 63 %
NON-HDL CHOLESTEROL: 118 MG/DL
NONHDLC SERPL-MCNC: 118 MG/DL
OSMOLALITY UR CALC.SUM OF ELEC: 287 MOSM/KG (ref 275–295)
PATIENT FASTING: YES
PLATELET # BLD AUTO: 107 K/UL (ref 140–400)
PLATELET # BLD AUTO: 77 10(3)UL (ref 150–450)
PMV BLD AUTO: 11.3 FL (ref 7.4–10.3)
POTASSIUM SERPL-SCNC: 4.5 MMOL/L (ref 3.3–5.1)
PROT SERPL-MCNC: 6.8 G/DL (ref 5.9–8.4)
PSA SERPL-MCNC: 0.5 NG/ML (ref 0–4)
RBC # BLD AUTO: 2.22 X10(6)UL (ref 4.3–5.7)
RBC # BLD AUTO: 5.19 M/UL (ref 4.5–5.9)
SODIUM SERPL-SCNC: 138 MMOL/L (ref 136–144)
TRIGL SERPL-MCNC: 119 MG/DL (ref 1–149)
TRIGLYCERIDES: 119 MG/DL
WBC # BLD AUTO: 7 K/UL (ref 4–11)
WBC # BLD AUTO: 7.4 X10(3) UL (ref 4–11)

## 2019-01-01 PROCEDURE — 97110 THERAPEUTIC EXERCISES: CPT

## 2019-01-01 PROCEDURE — 70450 CT HEAD/BRAIN W/O DYE: CPT | Performed by: EMERGENCY MEDICINE

## 2019-01-01 PROCEDURE — 93306 TTE W/DOPPLER COMPLETE: CPT | Performed by: PHYSICIAN ASSISTANT

## 2019-01-01 PROCEDURE — 93005 ELECTROCARDIOGRAM TRACING: CPT

## 2019-01-01 PROCEDURE — 97112 NEUROMUSCULAR REEDUCATION: CPT

## 2019-01-01 PROCEDURE — 99214 OFFICE O/P EST MOD 30 MIN: CPT | Performed by: OTHER

## 2019-01-01 PROCEDURE — 99396 PREV VISIT EST AGE 40-64: CPT | Performed by: INTERNAL MEDICINE

## 2019-01-01 PROCEDURE — 96375 TX/PRO/DX INJ NEW DRUG ADDON: CPT

## 2019-01-01 PROCEDURE — 99214 OFFICE O/P EST MOD 30 MIN: CPT | Performed by: INTERNAL MEDICINE

## 2019-01-01 PROCEDURE — 97110 THERAPEUTIC EXERCISES: CPT | Performed by: OCCUPATIONAL THERAPIST

## 2019-01-01 PROCEDURE — 80061 LIPID PANEL: CPT

## 2019-01-01 PROCEDURE — 97140 MANUAL THERAPY 1/> REGIONS: CPT

## 2019-01-01 PROCEDURE — 99291 CRITICAL CARE FIRST HOUR: CPT

## 2019-01-01 PROCEDURE — 97116 GAIT TRAINING THERAPY: CPT

## 2019-01-01 PROCEDURE — 85060 BLOOD SMEAR INTERPRETATION: CPT | Performed by: EMERGENCY MEDICINE

## 2019-01-01 PROCEDURE — 93880 EXTRACRANIAL BILAT STUDY: CPT | Performed by: INTERNAL MEDICINE

## 2019-01-01 PROCEDURE — 82962 GLUCOSE BLOOD TEST: CPT

## 2019-01-01 PROCEDURE — 97166 OT EVAL MOD COMPLEX 45 MIN: CPT | Performed by: OCCUPATIONAL THERAPIST

## 2019-01-01 PROCEDURE — 3078F DIAST BP <80 MM HG: CPT | Performed by: INTERNAL MEDICINE

## 2019-01-01 PROCEDURE — 93010 ELECTROCARDIOGRAM REPORT: CPT | Performed by: EMERGENCY MEDICINE

## 2019-01-01 PROCEDURE — 80048 BASIC METABOLIC PNL TOTAL CA: CPT

## 2019-01-01 PROCEDURE — 96374 THER/PROPH/DIAG INJ IV PUSH: CPT

## 2019-01-01 PROCEDURE — 82570 ASSAY OF URINE CREATININE: CPT | Performed by: INTERNAL MEDICINE

## 2019-01-01 PROCEDURE — 97140 MANUAL THERAPY 1/> REGIONS: CPT | Performed by: OCCUPATIONAL THERAPIST

## 2019-01-01 PROCEDURE — 36415 COLL VENOUS BLD VENIPUNCTURE: CPT

## 2019-01-01 PROCEDURE — 3074F SYST BP LT 130 MM HG: CPT | Performed by: INTERNAL MEDICINE

## 2019-01-01 PROCEDURE — 80053 COMPREHEN METABOLIC PANEL: CPT

## 2019-01-01 PROCEDURE — 82274 ASSAY TEST FOR BLOOD FECAL: CPT | Performed by: INTERNAL MEDICINE

## 2019-01-01 PROCEDURE — 99223 1ST HOSP IP/OBS HIGH 75: CPT | Performed by: HOSPITALIST

## 2019-01-01 PROCEDURE — 85025 COMPLETE CBC W/AUTO DIFF WBC: CPT

## 2019-01-01 PROCEDURE — 94002 VENT MGMT INPAT INIT DAY: CPT

## 2019-01-01 PROCEDURE — 97162 PT EVAL MOD COMPLEX 30 MIN: CPT

## 2019-01-01 PROCEDURE — 92950 HEART/LUNG RESUSCITATION CPR: CPT

## 2019-01-01 PROCEDURE — 99215 OFFICE O/P EST HI 40 MIN: CPT | Performed by: INTERNAL MEDICINE

## 2019-01-01 PROCEDURE — 85025 COMPLETE CBC W/AUTO DIFF WBC: CPT | Performed by: EMERGENCY MEDICINE

## 2019-01-01 PROCEDURE — 83036 HEMOGLOBIN GLYCOSYLATED A1C: CPT

## 2019-01-01 PROCEDURE — 99233 SBSQ HOSP IP/OBS HIGH 50: CPT | Performed by: HOSPITALIST

## 2019-01-01 PROCEDURE — 84484 ASSAY OF TROPONIN QUANT: CPT

## 2019-01-01 PROCEDURE — 71250 CT THORAX DX C-: CPT | Performed by: PHYSICIAN ASSISTANT

## 2019-01-01 PROCEDURE — 85060 BLOOD SMEAR INTERPRETATION: CPT

## 2019-01-01 PROCEDURE — 97530 THERAPEUTIC ACTIVITIES: CPT | Performed by: OCCUPATIONAL THERAPIST

## 2019-01-01 PROCEDURE — 82043 UR ALBUMIN QUANTITATIVE: CPT | Performed by: INTERNAL MEDICINE

## 2019-01-01 PROCEDURE — 71046 X-RAY EXAM CHEST 2 VIEWS: CPT | Performed by: EMERGENCY MEDICINE

## 2019-01-01 PROCEDURE — 31500 INSERT EMERGENCY AIRWAY: CPT

## 2019-01-01 PROCEDURE — 99239 HOSP IP/OBS DSCHRG MGMT >30: CPT | Performed by: HOSPITALIST

## 2019-01-01 RX ORDER — ONDANSETRON 2 MG/ML
4 INJECTION INTRAMUSCULAR; INTRAVENOUS EVERY 6 HOURS PRN
Status: DISCONTINUED | OUTPATIENT
Start: 2019-01-01 | End: 2019-01-01

## 2019-01-01 RX ORDER — METFORMIN HYDROCHLORIDE 500 MG/1
TABLET, EXTENDED RELEASE ORAL
Qty: 180 TABLET | Refills: 11 | Status: SHIPPED | OUTPATIENT
Start: 2019-01-01

## 2019-01-01 RX ORDER — ATORVASTATIN CALCIUM 80 MG/1
TABLET, FILM COATED ORAL
COMMUNITY
Start: 2019-01-01

## 2019-01-01 RX ORDER — ATORVASTATIN CALCIUM 80 MG/1
80 TABLET, FILM COATED ORAL NIGHTLY
COMMUNITY
End: 2019-01-01

## 2019-01-01 RX ORDER — CLOPIDOGREL BISULFATE 75 MG/1
75 TABLET ORAL DAILY
Status: DISCONTINUED | OUTPATIENT
Start: 2019-01-01 | End: 2019-01-01

## 2019-01-01 RX ORDER — CALCIUM CHLORIDE 100 MG/ML
INJECTION INTRAVENOUS; INTRAVENTRICULAR
Status: COMPLETED | OUTPATIENT
Start: 2019-01-01 | End: 2019-01-01

## 2019-01-01 RX ORDER — HEPARIN SODIUM 5000 [USP'U]/ML
5000 INJECTION, SOLUTION INTRAVENOUS; SUBCUTANEOUS EVERY 12 HOURS SCHEDULED
Status: DISCONTINUED | OUTPATIENT
Start: 2019-01-01 | End: 2019-01-01

## 2019-01-01 RX ORDER — LIDOCAINE HYDROCHLORIDE 20 MG/ML
INJECTION, SOLUTION EPIDURAL; INFILTRATION; INTRACAUDAL; PERINEURAL
Status: COMPLETED
Start: 2019-01-01 | End: 2019-01-01

## 2019-01-01 RX ORDER — SODIUM CHLORIDE 9 MG/ML
INJECTION, SOLUTION INTRAVENOUS CONTINUOUS
Status: DISCONTINUED | OUTPATIENT
Start: 2019-01-01 | End: 2019-01-01

## 2019-01-01 RX ORDER — ACETAMINOPHEN 325 MG/1
650 TABLET ORAL EVERY 6 HOURS PRN
Status: DISCONTINUED | OUTPATIENT
Start: 2019-01-01 | End: 2019-01-01

## 2019-01-01 RX ORDER — MIDODRINE HYDROCHLORIDE 2.5 MG/1
2.5 TABLET ORAL
Qty: 60 TABLET | Refills: 4 | Status: SHIPPED | OUTPATIENT
Start: 2019-01-01

## 2019-01-01 RX ORDER — SUCCINYLCHOLINE CHLORIDE 20 MG/ML
INJECTION INTRAMUSCULAR; INTRAVENOUS
Status: DISCONTINUED
Start: 2019-01-01 | End: 2019-01-01

## 2019-01-01 RX ORDER — MIDODRINE HYDROCHLORIDE 2.5 MG/1
TABLET ORAL
COMMUNITY
Start: 2019-01-01

## 2019-01-01 RX ORDER — MIDAZOLAM HYDROCHLORIDE 5 MG/ML
INJECTION INTRAMUSCULAR; INTRAVENOUS
Status: DISCONTINUED
Start: 2019-01-01 | End: 2019-01-01 | Stop reason: WASHOUT

## 2019-01-01 RX ORDER — MIDAZOLAM HYDROCHLORIDE 1 MG/ML
INJECTION INTRAMUSCULAR; INTRAVENOUS
Status: COMPLETED
Start: 2019-01-01 | End: 2019-01-01

## 2019-01-01 RX ORDER — MORPHINE SULFATE 4 MG/ML
INJECTION, SOLUTION INTRAMUSCULAR; INTRAVENOUS
Status: DISCONTINUED
Start: 2019-01-01 | End: 2019-01-01

## 2019-01-01 RX ORDER — METFORMIN HYDROCHLORIDE 500 MG/1
TABLET, EXTENDED RELEASE ORAL
Qty: 180 TABLET | Refills: 0 | Status: SHIPPED | OUTPATIENT
Start: 2019-01-01 | End: 2019-01-01

## 2019-01-01 RX ORDER — TEMAZEPAM 7.5 MG/1
7.5 CAPSULE ORAL NIGHTLY PRN
Status: DISCONTINUED | OUTPATIENT
Start: 2019-01-01 | End: 2019-01-01

## 2019-01-01 RX ORDER — ATORVASTATIN CALCIUM 80 MG/1
80 TABLET, FILM COATED ORAL NIGHTLY
Qty: 90 TABLET | Refills: 5 | Status: SHIPPED | OUTPATIENT
Start: 2019-01-01

## 2019-01-01 RX ORDER — SODIUM CHLORIDE 9 MG/ML
INJECTION, SOLUTION INTRAVENOUS
Status: DISCONTINUED
Start: 2019-01-01 | End: 2019-01-01

## 2019-01-01 RX ORDER — METOPROLOL TARTRATE 5 MG/5ML
5 INJECTION INTRAVENOUS ONCE
Status: COMPLETED | OUTPATIENT
Start: 2019-01-01 | End: 2019-01-01

## 2019-01-01 RX ORDER — SODIUM CHLORIDE 0.9 % (FLUSH) 0.9 %
3 SYRINGE (ML) INJECTION AS NEEDED
Status: DISCONTINUED | OUTPATIENT
Start: 2019-01-01 | End: 2019-01-01

## 2019-01-01 RX ORDER — DEXTROSE MONOHYDRATE 25 G/50ML
50 INJECTION, SOLUTION INTRAVENOUS AS NEEDED
Status: DISCONTINUED | OUTPATIENT
Start: 2019-01-01 | End: 2019-01-01

## 2019-01-01 RX ORDER — VITS A,C,E/LUTEIN/MINERALS 300MCG-200
1 TABLET ORAL DAILY
Status: DISCONTINUED | OUTPATIENT
Start: 2019-01-01 | End: 2019-01-01

## 2019-01-01 RX ORDER — AMIODARONE HYDROCHLORIDE 50 MG/ML
INJECTION, SOLUTION INTRAVENOUS
Status: COMPLETED | OUTPATIENT
Start: 2019-01-01 | End: 2019-01-01

## 2019-01-01 RX ORDER — CLOPIDOGREL BISULFATE 75 MG/1
TABLET ORAL
Qty: 30 TABLET | Refills: 4 | Status: SHIPPED | OUTPATIENT
Start: 2019-01-01

## 2019-01-01 RX ORDER — MIDODRINE HYDROCHLORIDE 2.5 MG/1
2.5 TABLET ORAL
Qty: 60 TABLET | Refills: 5 | Status: SHIPPED | OUTPATIENT
Start: 2019-01-01 | End: 2019-01-01

## 2019-01-01 RX ORDER — NITROGLYCERIN 0.4 MG/1
0.4 TABLET SUBLINGUAL EVERY 5 MIN PRN
Status: DISCONTINUED | OUTPATIENT
Start: 2019-01-01 | End: 2019-01-01

## 2019-01-01 RX ORDER — ETOMIDATE 2 MG/ML
INJECTION INTRAVENOUS
Status: DISCONTINUED
Start: 2019-01-01 | End: 2019-01-01

## 2019-01-01 RX ORDER — CLOPIDOGREL BISULFATE 75 MG/1
75 TABLET ORAL DAILY
Qty: 30 TABLET | Refills: 5 | Status: SHIPPED | OUTPATIENT
Start: 2019-01-01 | End: 2019-01-01

## 2019-01-01 RX ORDER — CLOPIDOGREL BISULFATE 75 MG/1
TABLET ORAL
COMMUNITY
Start: 2019-01-01

## 2019-01-01 RX ORDER — ASPIRIN 81 MG/1
324 TABLET, CHEWABLE ORAL ONCE
Status: COMPLETED | OUTPATIENT
Start: 2019-01-01 | End: 2019-01-01

## 2019-01-01 RX ORDER — MORPHINE SULFATE 4 MG/ML
8 INJECTION, SOLUTION INTRAMUSCULAR; INTRAVENOUS ONCE
Status: COMPLETED | OUTPATIENT
Start: 2019-01-01 | End: 2019-01-01

## 2019-01-01 RX ORDER — INTRAVENOUS ADMINISTRATION SET
INFUSION SETS-PARAPHERNALIA MISCELLANEOUS
COMMUNITY
Start: 2019-01-01

## 2019-01-01 RX ORDER — ATORVASTATIN CALCIUM 40 MG/1
80 TABLET, FILM COATED ORAL NIGHTLY
Status: DISCONTINUED | OUTPATIENT
Start: 2019-01-01 | End: 2019-01-01

## 2019-01-01 RX ORDER — ASPIRIN 325 MG
325 TABLET ORAL DAILY
Status: DISCONTINUED | OUTPATIENT
Start: 2019-01-01 | End: 2019-01-01

## 2019-01-01 RX ORDER — POTASSIUM CHLORIDE 20 MEQ/1
40 TABLET, EXTENDED RELEASE ORAL ONCE
Status: COMPLETED | OUTPATIENT
Start: 2019-01-01 | End: 2019-01-01

## 2019-01-01 RX ORDER — MIDODRINE HYDROCHLORIDE 2.5 MG/1
2.5 TABLET ORAL
Status: DISCONTINUED | OUTPATIENT
Start: 2019-01-01 | End: 2019-01-01

## 2019-01-01 RX ORDER — ROCURONIUM BROMIDE 10 MG/ML
INJECTION, SOLUTION INTRAVENOUS
Status: DISCONTINUED
Start: 2019-01-01 | End: 2019-01-01

## 2019-01-04 NOTE — PROGRESS NOTES
HPI:    Patient ID: Andriy Barnard is a 61year old male. HPI     Vasquez Vaughn is a 58year old male with DM, HTN, CVA, severe aortic stenosis,  who developed slurred speech and RUE weakness.  Initially presented on 7/21 to [facility]. Sx's were am . Metformin 500 mgs BID. He has blurred vision of Rt eye . Ophthalmologist planned cataract surgery when cleared by cardiology. BP had been around 120/ 80. Walks with walker. He walks a lot with buddies near his former work place.  Expereinced no di (Patient taking differently: Inject 1.2 mg into the skin nightly.  ) Disp: 3 pen Rfl: 4   Multiple Vitamins-Minerals (EYE VITAMINS) Oral Cap Take 1 capsule by mouth daily.  Disp:  Rfl:      Allergies:No Known Allergies   PHYSICAL EXAM:   Physical Exam   Bernadine circulatory complication, with long-term current use of insulin (HCC)  Examine feet daily   Low carbohydrate diet with portion control to ensure weight reduction  Regular exercise 3-4 times a week minimum  Should have yearly eye exam  Goal blood pressure 1

## 2019-01-08 NOTE — PROGRESS NOTES
OCCUPATIONAL THERAPY UPPER EXTREMITY EVALUATION:   Referring Physician: Dr. Tereso Younger  Date of onset: early December,2018  Diagnosis: Chronic pain of right wrist (F20.936,Z02.19) Date of Service: 1/4/2019       PATIENT SUMMARY:   Patria Litten is a 61 yr old male who benefit from continued skilled OT to address the subcomponents of ROM, edema, and strength to allow him the ability to regain above- noted skills. In agreement with FOTO score and clinical rationale this evaluation involved moderate decision making due to 3 1/10.  Pt will be independent and compliant with comprehensive HEP to maintain progress achieved in OT. Patient will demonstrate increase in right wrist CREWS by 20 degrees for ease in pushing off of chair.   Patient will demonstrate increase in right  s

## 2019-01-11 NOTE — PROGRESS NOTES
UPPER EXTREMITY EVALUATION:   Referring Physician: Dr. Bree Little  Date of Onset: 11/2018 Date of Service: 1/11/2019   Diagnosis: M25.511, G89.29 (ICD-10-CM) - 719.41, 338.29 (ICD-9-CM) - Chronic right shoulder pain  Insurance: [facility] Patient seen today for evaluation of right shoulder pain and painful limitation of AROM and PROM and right shoulder inferior 1 finger width subluxation.  Patient with difficulty reaching, dressing, sleeping uninterrupted, lifting arm away from body due to w Flexibility: Pec minor, pect major right greater than left    Strength/MMT:  Shoulder Elbow Scapular   Flexion: R 3-/5; L 5/5  Extension: R 4/5; L 5/5  Abduction: R 3-/5; L 5/5  ER: R 3-/5; L 5/5  IR: R 3+/5; L 5/5 Flexion: R 4-/5; L 5/5  Extension: R 4-/5 Patient/Family was advised of these findings, precautions, and treatment options and has agreed to actively participate in planning and for this course of care.     Thank you for your referral. Please co-sign or sign and return this letter via fax as soon a

## 2019-01-14 NOTE — PROGRESS NOTES
MD: Dr. Tabitha Cool  Diagnosis: M25.511, G89.29 (ICD-10-CM) - 719.41, 338.29 (ICD-9-CM) - Chronic right shoulder pain  Authorized # of Visits:  7 treatments approved (plus evaluation)  Insurance: [employer] [facility] MD visit: none scheduled  Fall Risk: improved painfree motion that she will be able to perform dressing without significant pain. 2. Patient will demonstrate improved painfree reaching to work on shoulder level ADL's with 50%l or greater reduction in pain  3.  Patient will demonstrate good un

## 2019-01-16 NOTE — PROGRESS NOTES
MD: Dr. Lisa Rosas  Diagnosis: M25.511, G89.29 (ICD-10-CM) - 719.41, 338.29 (ICD-9-CM) - Chronic right shoulder pain  Authorized # of Visits:  7 treatments approved (plus evaluation)  Insurance: [employer] [facility] MD visit: none scheduled  Fall Risk: HEP (5 minutes):  Continue with scap retractions as instructed at initial evaluation. Add shoulder rolls (both directions) and Wand exercises in sitting and supine into flexion HEP:  Added seated narrow and wide rows with green theraband.   Practiced in cl

## 2019-01-16 NOTE — PATIENT INSTRUCTIONS
Seated resisted NARROW ROWS with green band:  x15 reps 2-3 times a day  (sit in a chair, hook band around something sturdy like a dining room table leg or door knob)            WIDE ROWS:  Sit in chair as above and hold elbows away from your sides as you r

## 2019-01-21 NOTE — PROGRESS NOTES
MD: Dr. Tereso Younger  Diagnosis: M25.511, G89.29 (ICD-10-CM) - 719.41, 338.29 (ICD-9-CM) - Chronic right shoulder pain  Authorized # of Visits:  7 treatments approved (plus evaluation)  Insurance: [employer] [facility] MD visit: none scheduled  Fall Risk: AAROM with 2# wand in supine  x 20 reps: Flexion, abduction, ER, chest press and serratus punch    Prone: T's and rows w/RUE only 2 x 10 with clinician assist    Sitting 2# wand flexion, chest press forward, OH press x 15 each    Seated: narrow rows and wi Charges:  TherEx 3     Total Timed Treatment: 50 min  Total Treatment Time: 50 min

## 2019-01-22 NOTE — PROGRESS NOTES
Diagnosis:  Chronic pain of right wrist (M25.531,G89.29)  Authorized # of Visits:  6      Next MD visit: none scheduled  Insurance: [place] [id number] approved for 6  Fall Risk: standard         Precautions: Fall risk Total Timed Treatment: 45 min  Total Treatment Time: 45 min

## 2019-01-23 NOTE — PROGRESS NOTES
Progress Summary    Pt has attended 5, cancelled 0, and no shown 0 visits in Physical Therapy.      Progress Note Start Date: 1/24/19 End Date:     MD: Dr. Alireza Villasenor  Diagnosis: M25.511, G89.29 (ICD-10-CM) - 719.41, 338.29 (ICD-9-CM) - Chronic right shoulder pain R 60   ER: R 35   IR: R 30; L 65 Flexion: R 125 deg  Abduction: R 100 deg   ER: R 40 deg Flex: R: 130 deg  Abd: R: 110  deg  ER: 45 deg       Rehab Potential: fair+    Plan: Patient has completed 5/8 original visits of PT.   Will likely benefit from 5 addit mobilizations all directions and  Posterior glides humeral head. STM to posterior cuff area   HEP:  Added seated narrow and wide rows with green theraband.   Practiced in clinic with spouse  HEP: Reviewed for current program from previous sessions Discusse

## 2019-01-24 NOTE — PROGRESS NOTES
Diagnosis:  Chronic pain of right wrist (M25.531,G89.29)  Authorized # of Visits:  6      Next MD visit: none scheduled  Insurance: [place] [id number] approved for 6  Fall Risk: standard         Precautions: Fall risk OT.  Patient will demonstrate increase in right wrist CREWS by 20 degrees for ease in pushing off of chair. Morales Angles .(Achieved)  Patient will demonstrate increase in right  strength to at least 35 lbs for ease in holding walker.   Patient will demonstrate decreas

## 2019-01-28 NOTE — PROGRESS NOTES
Progress Summary    Pt has attended 6, cancelled 0, and no shown 0 visits in Physical Therapy.      Progress Note Start Date: 1/24/19 End Date: 1/28/19    MD: Dr. Forest Soto  Diagnosis: M25.511, G89.29 (ICD-10-CM) - 719.41, 338.29 (ICD-9-CM) - Chronic right shoulde ER: 45 deg Flex: R: 135  AAROM: flex 137  Abd: R:  110 deg pain  ER: R: 45 deg       Rehab Potential: fair+    Plan: Patient has completed 6/8 original visits of PT.   Will likely benefit from 5 additional visits for shoulder and benefit from gait training AAROM seated shoulder flexion with wand x 10 reps still pain present     Instruction on inclined AAROM wand flexion. Therap.  Ex: (45 min)    PROM: Right shoulder all planes  AAROM: wand flexion    Passive pect minor stretch    Mobilization with move 6.  Patient will demonstrate good tolerance with reduction in [address] instability and improved scapulohumeral mobility by 8 sessions        Plan: Continue with PT to address continued deficits of limited strength and AROM.    Pt to perform HEP as instructed

## 2019-01-28 NOTE — PROGRESS NOTES
Diagnosis:  Chronic pain of right wrist (M25.531,G89.29)  Authorized # of Visits:  6      Next MD visit: none scheduled  Insurance: [place] [id number] approved for 6  Fall Risk: standard         Precautions: Fall risk shoulder today. Supported shoulder throughout treatment activities to be able to complete tasks. Reported fatigue in hand near end of session.  Reporting pain across dorsum of right MP's and radial wrist.    Goals:   Pt complaints of pain in right hand will

## 2019-02-04 NOTE — PROGRESS NOTES
Pt has attended 7, cancelled 1 due to closure of clinic, and no shown 0 visits in Physical Therapy.      Progress Note    MD: Dr. Gareth Hernandez  Diagnosis: M25.511, G89.29 (ICD-10-CM) - 719.41, 338.29 (ICD-9-CM) - Chronic right shoulder pain  Authorized # of Visits: Scap retractions with manual and verbal cues.     Mobilization with movement of right scapular upward rotation with shoulder PROM flexion and abduction supine 15 x to tolerance    AROM: seated flexion, abduction but limited to 5 reps due to pain anterior sh 1. Patient will demonstrate an improved painfree motion that she will be able to perform dressing without significant pain. 2. Patient will demonstrate improved painfree reaching to work on shoulder level ADL's with 50%l or greater reduction in pain  3.  P

## 2019-02-04 NOTE — PROGRESS NOTES
Diagnosis:  Chronic pain of right wrist (M25.531,G89.29)  Authorized # of Visits:  6      Next MD visit: none scheduled  Insurance: [place] [id number] approved for 6  Fall Risk: standard         Precautions: Fall risk Wrap digits in coban  For 15 min during session Velcro checkers, two point pinch, and return with three point pinch and green  Red putty , roll, twist, pull, RD/UD, alternate pinching, lumbricals 8 min Red putty , roll, twist, pull, RD/UD, alte

## 2019-02-06 NOTE — PROGRESS NOTES
Patient Name: Denise Farley, : 1955, MRN: [id number]   Date:  2019  Referring Physician:   Saima Prabhakar    Diagnosis: Chronic pain of right wrist (M25.531,G89.29)      Discharge Summary OT    Pt has attended 6, cancelled 0, and no shown 0 visits - shoulder and hand 5 min Moist heat - shoulder and hand 5 min Moist heat - shoulder and hand 5 min     Wrist PROM flex/extension x 10 Metal ball rotation in right and left hand 4min Wrist PROM flex/extension x 10 Wrist and digit PROM flex/extension x 10 W stabilization exercises, elevation, retraction x10, 2 sets         Goals:   Pt complaints of pain in right hand will decrease at worst to 1/10... .(Not achieved)  Pt will be independent and compliant with comprehensive HEP to maintain progress achieved in O

## 2019-02-06 NOTE — PROGRESS NOTES
Pt has attended 8  cancelled 1 due to closure of clinic, and no shown 0 visits in Physical Therapy.      Progress Note/DC note    MD: Dr. Bailee Hooper  Diagnosis: M25.511, G89.29 (ICD-10-CM) - 719.41, 338.29 (ICD-9-CM) - Chronic right shoulder pain  Authorized # of AAROM: Seated flex: 110 deg Flex: 94: with substitution  Ext: 52 deg  Abd: 75 deg pain mostly upon return 8/10           PROM:  Shoulder (SUPINE) at Eval Shoulder (Supine) 1/14/19 Shoulder (supine) 1/23/19 Shoulder supine 1/28/19 Supine Shoulder   2/4/19 1. Patient will demonstrate an improved painfree motion that she will be able to perform dressing without significant pain. MOSTLY MET  2.  Patient will demonstrate improved painfree reaching to work on shoulder level ADL's with 50%l or greater reduction i

## 2019-02-15 NOTE — PROGRESS NOTES
NEUROLOGICAL PHYSICAL THERAPY EVALUATION:   Referring Physician: Dr. Bailee Hooper  Diagnosis: CVA, gait instability, R drop foot      Date of Onset: per HPI Date of Service: 2/15/2019     PATIENT SUMMARY   Denise Farley is a 61year old y/o male who presents to  CVA as well as L BKA resulting in impaired strength and balance negatively affecting stability with walking. He was previously independent in ambulation without AD and use of prosthesis but now must use a walker for stability since CVA.  He is considered an Standing with one foot in front   ___0___14.  Standing on one foot     Total ___31__/56 (less than 46/56 = fall risk)    Functional Mobility:  5x Sit to Stand: unable   Gait deviations: postural instability decreased wt shift to the R, decreased time in sta under my care.     X___________________________________________________ Date____________________    Certification From: 3/92/1806  To:5/16/2019

## 2019-02-25 NOTE — PROGRESS NOTES
Diagnosis: CVA, gait instability, R drop foot        Visit (# authorized):   12 per POC (8 visits approved exp 3/22) [facility]                       Referring Physician: Dr Julio Haji    Precautions:  At risk for falls     Medication changes since last visit?:  No    Gonzalez

## 2019-02-25 NOTE — PATIENT INSTRUCTIONS
Do these exercises 2 times each day    1. Seated in a chair. Place yellow band over the top of your shoe. Your wife will have to anchor it with her foot on each side. Pull foot up at the ankle and hold for 3 seconds.  Do 2 sets of 10.     2. Lie on your casie

## 2019-02-28 NOTE — PROGRESS NOTES
Diagnosis: CVA, gait instability, R drop foot        Visit (# authorized):   12 per POC (8 visits approved exp 3/22) [facility]                       Referring Physician: Dr Sia Wiseman    Precautions:  At risk for falls     Medication changes since last visit?:  No    Gonzalez

## 2019-03-07 NOTE — PROGRESS NOTES
Diagnosis: CVA, gait instability, R drop foot        Visit (# authorized):   12 per POC (8 visits approved exp 3/22) [facility]                       Referring Physician: Dr Claudia Barrera    Precautions:  At risk for falls     Medication changes since last visit?:  No    Gonzalez

## 2019-03-08 NOTE — PROGRESS NOTES
Steve Castro is a 61year old male who presents for a complete physical exam.        Pt has multiple medical problems:  He has Severe aortic stenosis, CAD DM, HTN,PAD s/ L BKA multiple CVAs,.  He had  severe vertebral artery stenosis with cerebellar strok 52.0 % 43.0   MCV      80.0 - 100.0 fL 82.9   MCH      27.0 - 32.0 pg 28.4   MCHC      32.0 - 37.0 g/dl 34.3   RDW      11.0 - 15.0 % 14.6   Platelet Count      590 - 400 K/ (L)   MEAN PLATELET VOLUME      7.4 - 10.3 fL 11.3 (H)   Neutrophils % Inject 30 Units into the skin nightly. Disp:  Rfl: 0   aspirin 81 MG Oral Tab Take 1 tablet (81 mg total) by mouth daily. Disp: 1 tablet Rfl: 0   Glucose Blood (ONETOUCH VERIO) In Vitro Strip Check glucose 3 x daily. Use as directed.  Disp: 300 strip Rfl: exertion  GI: denies abdominal pain,denies heartburn  : denies nocturia or changes in stream  MUSCULOSKELETAL: denies back pain  NEURO: denies headaches  PSYCHE: denies depression or anxiety  HEMATOLOGIC: denies hx of anemia  ENDOCRINE: denies thyroid hi continuity equation (using LVOT     flow): 2.8cm^2. 4. Left atrium: The atrium was mildly dilated. 5. Right atrium: The atrium was mildly dilated.   6. Atrial septum: Agitated saline contrast study showed no atrial     level shunt, at baseline or with pro

## 2019-03-09 PROBLEM — L03.119 CELLULITIS IN DIABETIC FOOT: Status: RESOLVED | Noted: 2017-12-24 | Resolved: 2019-01-01

## 2019-03-09 PROBLEM — E11.628 CELLULITIS IN DIABETIC FOOT (HCC): Status: RESOLVED | Noted: 2017-12-24 | Resolved: 2019-01-01

## 2019-03-09 PROBLEM — E11.628 CELLULITIS IN DIABETIC FOOT: Status: RESOLVED | Noted: 2017-12-24 | Resolved: 2019-01-01

## 2019-03-09 PROBLEM — R55 SYNCOPE AND COLLAPSE: Status: RESOLVED | Noted: 2018-06-26 | Resolved: 2019-01-01

## 2019-03-09 PROBLEM — L03.119 CELLULITIS IN DIABETIC FOOT (HCC): Status: RESOLVED | Noted: 2017-12-24 | Resolved: 2019-01-01

## 2019-03-09 PROBLEM — I96 GANGRENE OF TOE (HCC): Status: RESOLVED | Noted: 2017-12-24 | Resolved: 2019-01-01

## 2019-03-11 NOTE — PROGRESS NOTES
Diagnosis: CVA, gait instability, R drop foot        Visit (# authorized):   12 per POC (8 visits approved exp 3/22) [facility]                       Referring Physician: Dr Stephon Schuler    Precautions:  At risk for falls     Medication changes since last visit?:  No    Gonzalez

## 2019-03-11 NOTE — TELEPHONE ENCOUNTER
Wilber Dominguez from Dr[facility] is calling. Dr. Teodora Montiel would like to know if Dr. Marilia Ramey would review patients most recent imaging to review intracranial lesions. Would like to know prior to patients open heart surgery if patient would have any pre-op treatment recommendations for intracranial lesions. Open heart surgery has not been scheduled as of yet.      [facility] office number is [phone]    ** patient has never been seen by ROSA MARIA **

## 2019-03-11 NOTE — PROGRESS NOTES
After reviewing all studies I think patient needs TAVR and coronary stenting as he is too high risk for open surgery. I will call patient to let him know Dr. Zane Tony will be scheduling him for PTCA and TAVR evaluation.

## 2019-03-13 NOTE — PROGRESS NOTES
Diagnosis: CVA, gait instability, R drop foot        Visit (# authorized):   12 per POC (8 visits approved exp 3/22) [facility]                       Referring Physician: Dr Linda Molina    Precautions:  At risk for falls     Medication changes since last visit?:  No    Gonzalez

## 2019-03-18 NOTE — PROGRESS NOTES
Diagnosis: CVA, gait instability, R drop foot        Visit (# authorized):   12 per POC (8 visits approved exp 3/22) [facility]                       Referring Physician: Dr Cherylene Boston    Precautions:  At risk for falls     Medication changes since last visit?:  No    Gonzalez

## 2019-03-20 NOTE — PROGRESS NOTES
Diagnosis: CVA, gait instability, R drop foot        Visit (# authorized):   12 per POC (9 visits approved exp 3/22 -including eval) -[address]                       Referring Physician: Dr Bree Little    Precautions:  At risk for falls     Medication changes since last v

## 2019-03-25 NOTE — PROGRESS NOTES
Patient Name: Justen Ortiz, : 1955, MRN: [id number]   Date:  3/25/2019  Referring Physician: Dr Charlotte Winston    Diagnosis: CVA, gait instability, R drop foot       Progress Summary    Pt has attended 9 total visits in Physical Therapy.      Progress Note (31)__/56 (less than 46/56 = fall risk)    5x Sit to Stand: able to perform with hands on knees after multiple attempts.      Timed Up and Go (AD, time): 21 seconds (22 sec) RW; 23 seconds (23.3 seconds) SPC-close supervision; 20.6 seconds (22.7 sec min A f plan of treatment and while under my care.     X___________________________________________________ Date____________________    Certification From: 7/25/2863  To:6/23/2019

## 2019-03-25 NOTE — PATIENT INSTRUCTIONS
Do these exercises 2 times each day    1. Seated in a chair. Place red band over the top of your shoe. Your wife will have to anchor it with her foot on each side. Pull foot up at the ankle and hold for 3 seconds.  Do 2 sets of 15.     2. Stand at your walk

## 2019-03-27 NOTE — PROGRESS NOTES
Neurology Initial Visit     Referred By: Dr. Hudson ref. provider found    Chief Complaint: Patient presents with:  Neurologic Problem: Patient presents today stating he had stroke in July 2018. He states that he has been doing well.  He is doing PT which has • Hx of BKA, left ([facility] [address] 01/2018   • S/P BKA (below knee amputation) unilateral, left ([facility] [address] 1/19/2018   • Stroke [facility])        Past Surgical History:   Procedure Laterality Date   • KNEE AMPUTATION ABOVE/BELOW Left 1/2/2018    Performed by Jennifer Wilkerson 184 lb   Height: 72\"       General: No apparent distress, well nourished, well groomed.   Head- Normocephalic, atraumatic  Eyes- No redness or swelling  ENT- Hearing intake, smell preserved, normal glutition  Neck- No masses or adenopathy  Cv: pulses were HCT 43.0 01/06/2019    MCV 82.9 01/06/2019    WBC 7.0 01/06/2019     (L) 01/06/2019      Lab Results   Component Value Date    BUN 14 01/06/2019    CA 9.1 01/06/2019    ALT 13 (L) 01/06/2019    AST 19 01/06/2019    ALB 3.7 01/06/2019     01

## 2019-04-05 PROBLEM — I25.10 CAD (CORONARY ARTERY DISEASE): Status: ACTIVE | Noted: 2019-01-01

## 2019-04-05 PROBLEM — E78.5 HYPERLIPIDEMIA: Status: ACTIVE | Noted: 2019-01-01

## 2019-04-05 PROBLEM — I35.0 AORTIC STENOSIS: Status: ACTIVE | Noted: 2019-01-01

## 2019-04-05 PROBLEM — R94.31 ABNORMAL EKG: Status: ACTIVE | Noted: 2019-01-01

## 2019-04-05 PROBLEM — I10 HYPERTENSION: Status: ACTIVE | Noted: 2019-01-01

## 2019-04-05 NOTE — PROGRESS NOTES
Diagnosis: CVA, gait instability, R drop foot        Visit (# authorized):   15 (6 additional visits approved exp 5/7) -[facility]                       Referring Physician: Dr Carol Ann Card    Precautions:  At risk for falls     Medication changes since last visit?:  No

## 2019-04-12 NOTE — PROGRESS NOTES
Diagnosis: CVA, gait instability, R drop foot        Visit (# authorized):   15 (6 additional visits approved exp 5/7) -[facility]                       Referring Physician: Dr Stefany Claire    Precautions:  At risk for falls     Medication changes since last visit?:  No

## 2019-04-16 NOTE — PROGRESS NOTES
Diagnosis: CVA, gait instability, R drop foot        Visit (# authorized):   15 (6 additional visits approved exp 5/7) -[facility]                       Referring Physician: Dr Sia Wiseman    Precautions:  At risk for falls     Medication changes since last visit?:  No

## 2019-04-22 NOTE — TELEPHONE ENCOUNTER
Per [employer],    \"No clinical review is needed for this request currently. This medication is covered and does not require authorization under the patient's current pharmacy benefit. \"    Faxed copy of notice to pharmacy.

## 2019-04-24 NOTE — PROGRESS NOTES
Diagnosis: CVA, gait instability, R drop foot        Visit (# authorized):   15 (6 additional visits approved exp 5/7) -[facility]                       Referring Physician: Dr Emily Casillas    Precautions:  At risk for falls     Medication changes since last visit?:  No

## 2019-04-24 NOTE — PATIENT INSTRUCTIONS
Do these exercises 2 times each day    1. Seated in a chair. Place red band over the top of your shoe. Your wife will have to anchor it with her foot on each side. Pull foot up at the ankle and hold for 5 seconds.  Do 2 sets of 15.     2. Stand at your walk

## 2019-05-01 NOTE — PROGRESS NOTES
Diagnosis: CVA, gait instability, R drop foot        Visit (# authorized):   15 (6 additional visits approved exp 5/7) -[facility]                       Referring Physician: Dr Tabitha Cool    Precautions:  At risk for falls     Medication changes since last visit?:  No

## 2019-05-06 PROBLEM — R07.9 CHEST PAIN: Status: ACTIVE | Noted: 2019-01-01

## 2019-05-06 PROBLEM — R77.8 ELEVATED TROPONIN: Status: ACTIVE | Noted: 2019-01-01

## 2019-05-06 PROBLEM — R07.9 CHEST PAIN, UNSPECIFIED TYPE: Status: ACTIVE | Noted: 2019-01-01

## 2019-05-06 PROBLEM — I21.4 NSTEMI (NON-ST ELEVATED MYOCARDIAL INFARCTION) (HCC): Status: ACTIVE | Noted: 2019-01-01

## 2019-05-06 PROBLEM — R79.89 ELEVATED TROPONIN: Status: ACTIVE | Noted: 2019-01-01

## 2019-05-06 NOTE — ED PROVIDER NOTES
Patient Seen in: [facility] AND [facility] Emergency Department    History   Patient presents with:  Chest Pain Angina (cardiovascular)    Stated Complaint:     HPI    60-year-old male with history of hypertension, diabetes, dyslipidemia, coronary artery disease (36.7 °C)   Temp src 05/06/19 2004 Oral   SpO2 05/06/19 1636 96 %   O2 Device 05/06/19 1636 None (Room air)       Current:/90 (BP Location: Right arm)   Pulse 66   Temp 97.9 °F (36.6 °C) (Oral)   Resp 18   Ht 177.8 cm (5' 10\")   Wt 83.9 kg   SpO2 98 other components within normal limits   CBC W/ DIFFERENTIAL - Abnormal; Notable for the following components:    .0 (*)     All other components within normal limits   PTT, ACTIVATED - Normal   PROTHROMBIN TIME (PT) - Normal   CBC WITH DIFFERENTIAL

## 2019-05-06 NOTE — PROGRESS NOTES
Patient Name: Lakisha Gordon, : 1955, MRN: [id number]   Date:  2019  Referring Physician: Saima Prabhakar    Diagnosis: CVA, gait instability, R drop foot       Progress Summary    Pt has attended 15 visits in Physical Therapy.      Progress Not seconds SBQC (23 seconds SPC); 20.5 seconds (20.6 seconds CGA for balance) no AD- SBA   Fall Risk: yes    Ambulation through clinic no AD, SBA- ft (one LOB requiring min A to recover)    Ambulation outdoors over uneven ground including incline/decli Total treatment time: 45 mins      Rehab Potential: good    Plan: Continue skilled Physical Therapy 1 x/week or a total of 6 visits over a 90 day period. Treatment will include: Balance Training, Gait Training,  Therapeutic Exercises;  Neuromuscular Re-educ

## 2019-05-06 NOTE — H&P
[facility]    PATIENT'S NAME: Stephen Christine   ATTENDING PHYSICIAN: Misha Galvan MD   PATIENT ACCOUNT#:   [id number]    LOCATION:  [place] [address]  MEDICAL RECORD #:   [id number]       YOB: 1955  ADMISSION DATE:       05/06/20 patient reports while he was getting physical therapy today, he felt midsternal chest tightness, diaphoresis, and dizziness. The symptoms went away after he rested. Chest pain lasted around 5 minutes to 10 minutes, per the patient.   No jaw pain or neck p

## 2019-05-06 NOTE — PROGRESS NOTES
HPI:    Patient ID: Denise Farley is a 61year old male. HPI     Patient was in physical therapy earlier, developed chest pain and diaphoretic. Per physical therapist, he was  pale and incoherent lasting few seconds.  He was sent to our office for evalu Take 1 tablet (75 mg total) by mouth daily. Disp: 30 tablet Rfl: 5   Midodrine HCl 2.5 MG Oral Tab Take 1 tablet (2.5 mg total) by mouth 2 (two) times daily before meals.  Disp: 60 tablet Rfl: 5   Glucose Blood (ONETOUCH VERIO) In Vitro Strip Check glucose

## 2019-05-07 NOTE — PROGRESS NOTES
[facility]    Progress Note    Easton Joaquin Patient Status:  Inpatient    1955 MRN [id number]   Location [facility] 3W/SW Attending Aaliyah Noguera MD   Hosp Day # 1 PCP Vanda Guadalupe MD       Subjective:     Feeling be calcification at the bilateral carotid siphons 75% left internal carotid artery greater than 90% narrowing of the bilateral vertebral arteries.     Dispo: pending   Lengthy d/w pt, wife and son at bedside  D/w RN     Results:     Lab Results   Component Komal Rhythm Possible left ventricular hypertrophy on non-voltage basis. -Nonspecific ST depression + T-abnormality -Seen with left ventricular hypertrophy (strain) or digitalis effect consider Lateral ischemia.  ABNORMAL When compared with ECG of 05/06/2019 16:

## 2019-05-07 NOTE — PLAN OF CARE
Problem: Diabetes/Glucose Control  Goal: Glucose maintained within prescribed range  Description  INTERVENTIONS:  - Monitor Blood Glucose as ordered  - Assess for signs and symptoms of hyperglycemia and hypoglycemia  - Administer ordered medications to m resources  Description  INTERVENTIONS:  - Identify barriers to discharge w/pt and caregiver  - Include patient/family/discharge partner in discharge planning  - Arrange for needed discharge resources and transportation as appropriate  - Identify discharge assess for edema, trend weights  Outcome: Progressing  Goal: Absence of cardiac arrhythmias or at baseline  Description  INTERVENTIONS:  - Continuous cardiac monitoring, monitor vital signs, obtain 12 lead EKG if indicated  - Evaluate effectiveness of anti

## 2019-05-07 NOTE — PROGRESS NOTES
[facility]    Progress Note    Radha Izquierdo Patient Status:  Inpatient    1955 MRN [id number]   Location [facility] 3W/SW Attending Dae Manzanares MD   Hosp Day # 1 PCP Marrion Rubinstein, MD       Assessment and Plan: 1-5 Units Subcutaneous TID CC   • aspirin  325 mg Oral Daily   • atorvastatin  80 mg Oral Nightly   • Clopidogrel Bisulfate  75 mg Oral Daily   • insulin detemir  20 Units Subcutaneous Nightly   • Midodrine HCl  2.5 mg Oral BID AC   • OCUVITE-LUTEIN  1 tab 5/06/2019 at 21:09          Ekg 12-lead    Result Date: 5/6/2019  ECG Report  Interpretation  -------------------------- Sinus Rhythm Possible left ventricular hypertrophy on non-voltage basis.   -Nonspecific ST depression + T-abnormality -Seen with left ve

## 2019-05-07 NOTE — PLAN OF CARE
Problem: Diabetes/Glucose Control  Goal: Glucose maintained within prescribed range  Description  INTERVENTIONS:  - Monitor Blood Glucose as ordered  - Assess for signs and symptoms of hyperglycemia and hypoglycemia  - Administer ordered medications to m precautions as indicated by assessment.  - Educate pt/family on patient safety including physical limitations  - Instruct pt to call for assistance with activity based on assessment  - Modify environment to reduce risk of injury  - Provide assistive device Progressing     Problem: CARDIOVASCULAR - ADULT  Goal: Maintains optimal cardiac output and hemodynamic stability  Description  INTERVENTIONS:  - Monitor vital signs, rhythm, and trends  - Monitor for bleeding, hypotension and signs of decreased cardiac ou

## 2019-05-07 NOTE — CONSULTS
[facility] HOSP - [facility]    Report of Consultation    Manual Pagrosalinai Patient Status:  Inpatient    1955 MRN [id number]   Location [facility] 3W/SW Attending Sergio Steven MD   Hosp Day # 0 PCP Julee Stout MD     Date of Admission:   Problem Relation Age of Onset   • Cancer Father    • Dementia Mother    • Diabetes Mother    • Other (Other) Mother    • Diabetes Paternal Grandfather    • Cancer Brother      Social History:  Social History    Tobacco Use      Smoking status: Never Smok edema  Skin warm  Neuro-awake alert oriented x3 no focal deficits  Results:     Lab Results   Component Value Date    WBC 7.5 05/06/2019    HGB 13.7 05/06/2019    HCT 41.4 05/06/2019    .0 (L) 05/06/2019    CREATSERUM 1.13 05/06/2019    BUN 22 (H) 0 lightheadedness-no syncope or near syncope  Multivessel coronary disease with severe aortic stenosis, carotid disease and severe disease  Telemetry rule out arrhythmia    Hyperlipidemia  Statin    Severe carotid disease/vertebral stenosis, history of CVAs

## 2019-05-07 NOTE — PAYOR COMM NOTE
--------------  ADMISSION REVIEW     Payor: Mario Salcedo #:  [id number]  Authorization Number:  [id number]     Admit date: 5/6/19  Admit time: [employer] Physician: Jose Lorenzo MD  Attending Physician • KNEE AMPUTATION ABOVE/BELOW Left 1/2/2018    Performed by Valentina Douglas MD at [facility] MAIN OR   • TOE AMPUTATION Left 12/25/2017    Performed by Paula Ackerman DPM at [facility] MAIN OR           Social History    Tobacco Use      Smoking status: Never Sm All other components within normal limits   LIPID PANEL - Abnormal; Notable for the following components:    HDL Cholesterol 30 (*)     All other components within normal limits   TROPONIN I - Abnormal; Notable for the following components:    Troponin 0. Admission disposition: 5/6/2019  6:00 PM                 Disposition and Plan     Clinical Impression:  Chest pain, unspecified type  (primary encounter diagnosis)  Elevated troponin    Disposition:  Admit  5/6/2019  6:00 pm    Follow-up:  No follow-up pro PAST MEDICAL HISTORY:  Patient is known to have history of multivessel coronary artery disease, was evaluated by Cardiovascular Surgery, Dr. Marek Multani, and considering his diffuse vertebrobasilar and intracranial atherosclerosis, he was deemed to be very high NECK:  Supple. No lymphadenopathy. Trachea midline. Full range of motion. LUNGS:  Clear to auscultation bilaterally. Normal respiratory effort. No intercostal retractions. HEART:  Regular rate and rhythm. S1 and S2 auscultated. No murmur.    ABDO Patient is a 20-year-old gentleman who follows up with Dr. Kyleigh Bunn patient has known severe aortic stenosis moderate left ventricular hypertrophy normal ejection fraction and severe multivessel coronary artery disease, history of CVAs, peripheral vascular Allergies/Medications:    Allergies: No Known Allergies     Medications Prior to Admission:  metFORMIN HCl  MG Oral Tablet 24 Hr TAKE ONE TABLET BY MOUTH TWICE DAILY WITH MEALS   insulin glargine (BASAGLAR KWIKPEN) 100 UNIT/ML Subcutaneous Solution Pe    05/06/2019     CO2 27.0 05/06/2019      (H) 05/06/2019     CA 9.2 05/06/2019     ALB 3.7 01/06/2019     ALKPHO 61 01/06/2019     BILT 0.9 01/06/2019     TP 6.8 01/06/2019     AST 19 01/06/2019     ALT 13 (L) 01/06/2019     PTT 40.9 (H) 07/2 Patient also scheduled for outpatient carotid Doppler in March which patient has  Completed-schedule carotid for AM     Diabetes  Per PCP           Sophia Flores MD  5/6/2019            MEDICATIONS ADMINISTERED IN LAST 1 DAY:  aspirin tab 325 mg     Date

## 2019-05-08 NOTE — PROGRESS NOTES
[facility] HOSP - [facility]    Cardiology Progress Note    Bettina Coyle Patient Status:  Inpatient    1955 MRN [id number]   Location [facility] 3W/SW Attending Adela Washington MD   Hosp Day # 2 PCP Audree Mohs, MD     Primary Cardiologist: VERÓNICA bedside with patient and wife. Advised ER evaluation with any new or changes in symptoms.       Results:     Lab Results   Component Value Date    WBC 7.2 05/07/2019    HGB 13.2 05/07/2019    HCT 38.7 (L) 05/07/2019    .0 (L) 05/07/2019    CREATSERU left ventricular hypertrophy (strain) or digitalis effect consider Lateral ischemia.  ABNORMAL When compared with ECG of 05/06/2019 16:37:54 No significant changes have occurred Electronically signed on 05/06/2019 at 21:48 by Jeff Lovett

## 2019-05-08 NOTE — PLAN OF CARE
Problem: Diabetes/Glucose Control  Goal: Glucose maintained within prescribed range  Description  INTERVENTIONS:  - Monitor Blood Glucose as ordered  - Assess for signs and symptoms of hyperglycemia and hypoglycemia  - Administer ordered medications to m resources  Description  INTERVENTIONS:  - Identify barriers to discharge w/pt and caregiver  - Include patient/family/discharge partner in discharge planning  - Arrange for needed discharge resources and transportation as appropriate  - Identify discharge for signs of decreased coronary artery perfusion - ex.  Angina  - Evaluate fluid balance, assess for edema, trend weights  Outcome: Progressing  Goal: Absence of cardiac arrhythmias or at baseline  Description  INTERVENTIONS:  - Continuous cardiac monitorin

## 2019-05-08 NOTE — DISCHARGE SUMMARY
[facility] HOSPITALIST  DISCHARGE SUMMARY     Lena Locke Patient Status:  Inpatient    1955 MRN [id number]   Location [facility] 3W/SW Attending Berenice Sepulveda MD   Hosp Day # 2 PCP Coty Fox MD     DATE OF ADMISSION: 2019  DATE OF HEENT: NCAT, neck supple, no carotid bruit. CV: RRR, S1S2, and intact distal pulses. No gallop, rub, murmur. Pulm: Effort and breath sounds normal. No distress, wheezes, rales, rhonchi. Abd: Soft, NTND, BS normal, no mass, no HSM, no rebound/guarding. by mouth 2 (two) times daily before meals. Quantity:  60 tablet  Refills:  5     ONETOUCH VERIO Strp      Check glucose 3 x daily. Use as directed.    Quantity:  300 strip  Refills:  3            CONSULTANTS  Consultants     Provider Role Specialty    Mur

## 2019-05-09 NOTE — PAYOR COMM NOTE
--------------  DISCHARGE REVIEW    Payor: Mario Sal[employer]o #:  [id number]  Authorization Number:  [id number]     Admit date: 5/6/19  Admit time:  1919  Discharge Date: 5/8/2019  2:17 PM     Admitting Physician: Sue Perez multivessel disease. CV surgery consultation obtained. Patient deemed too high risk for open heart bypass. Complicated PCI recommended. He will return in 1 week for that. Also noted to have severe aortic stenosis. Recommended to have TAVR procedure. Quantity:  180 tablet  Refills:  11        CONTINUE taking these medications      Instructions Prescription details   aspirin 81 MG Tabs      Take 1 tablet (81 mg total) by mouth daily.    Quantity:  90 tablet  Refills:  3     atorvastatin 80 MG Tabs  Commo 103/68, pulse 82, temperature 97.8 °F (36.6 °C), temperature source Oral, resp. rate 18, height 5' 10\" (1.778 m), weight 180 lb 9.6 oz (81.9 kg), SpO2 96 %.     GEN: NAD, cheerful  HEENT: conjunctivae/corneas clear. PERRL, EOM's intact.   Neck: no adenopat 05/07/2019     CREATSERUM 1.04 05/07/2019     BUN 18 05/07/2019      05/07/2019     K 3.8 05/07/2019      05/07/2019     CO2 27.0 05/07/2019     GLU 78 05/07/2019     CA 9.1 05/07/2019     ALB 3.7 01/06/2019     ALKPHO 61 01/06/2019     BILT 0.

## 2019-05-24 NOTE — PROGRESS NOTES
Andriy Barnard is a 61year old male here today for hospital follow up.    He was discharged from Inpatient hospital[facility]  to [address] [place] [address] Date: 5/6/2019  Discharge Date: 5/9/2019    Hospital Discharge Diagnosis:   Non-ST-elevation myocardi patient PCI on 5/14/2019. He has severe aortic stenosis and was to have TAVR procedure.   Dr. Farhana Kebede postponed PCI and want him to have further  evaluation by cardiologist from [facility] .     Experienced  no recurrence of chest pain since dischar cholesterol, BKA, left ([facility] [address] (01/2018), [facility] S/P BKA (below knee amputation) unilateral, left [facility] (1/19/2018), and Stroke ([facility] He  has no past surgical history on file.     He family history includes Cancer in his [de-identified] tenderness  MUSCULOSKELETAL: back is not tender, S/p L BKA . EXTREMITIES: Rt LE with AFO . no cyanosis, clubbing or edema. Monofilament intact. NEURO: Oriented times three, cranial nerves are intact, motor and sensory are grossly intact.  Rt hemiparesis care  Done with  physical therapy .  Continue home exercise as tolerated      Transitional Care Management Certification:  I certify that the following are true:  Communication with the patient was made within 2 business days of discharge on date above   Me

## 2019-05-25 PROBLEM — I21.4 NON-ST ELEVATION MI (NSTEMI) (HCC): Status: ACTIVE | Noted: 2019-01-01

## 2019-06-21 PROBLEM — I10 HYPERTENSION: Status: ACTIVE | Noted: 2019-01-01

## 2019-06-21 PROBLEM — I35.0 AORTIC STENOSIS: Status: ACTIVE | Noted: 2019-01-01

## 2019-06-21 PROBLEM — E78.5 HYPERLIPIDEMIA: Status: ACTIVE | Noted: 2019-01-01

## 2019-06-21 PROBLEM — R94.31 ABNORMAL EKG: Status: ACTIVE | Noted: 2019-01-01

## 2019-06-21 PROBLEM — I25.10 ATHEROSCLEROSIS OF CORONARY ARTERY: Status: ACTIVE | Noted: 2019-01-01

## 2019-06-21 NOTE — PROGRESS NOTES
HPI:    Patient ID: Les Brumfield is a 61year old male. HPI     Patient  has PAD s/p L BKA. He is transtibial amputee . He has been using his first prosthehsis for over year.  Since being fitted with his initial prosthesis, Mr. Tiara Baron has lost significa [facility]) 100 UNIT/ML Subcutaneous Solution Pen-injector Inject 30 Units into the skin nightly. (Patient taking differently: Inject 20-30 Units into the skin nightly.  Pt adjusts dose based on sugar level. ) Disp: 15 mL Rfl: 5   aspirin 81 MG Oral Gait abnormal. Coordination normal.   Psychiatric: Judgment and thought content normal.              ASSESSMENT/PLAN:   1. BKA stump complication (HCC)  Significant volume loss of below left knee residual limb due to muscle atrophy.  Will need new ultraligh

## 2019-06-22 PROBLEM — I25.10 CORONARY ARTERY DISEASE INVOLVING NATIVE CORONARY ARTERY OF NATIVE HEART WITHOUT ANGINA PECTORIS: Status: ACTIVE | Noted: 2019-01-01

## 2019-08-28 NOTE — ED PROVIDER NOTES
Patient Seen in: [facility] AND [facility] Emergency Department    History   Patient presents with:  Chest Pain    Stated Complaint: CP+nauseA/VOMITING    HPI    History is provided by EMS and patient.     55-year-old male with history of CAD, Aortic stenosis, PA EKG    Rate, intervals and axes as noted on EKG Report.   Rate: 152  Rhythm: Atrial Fibrillation  Reading: abnormal for rate, abnormal for rhythm, ST elevation in II, AVF, ST depressions in V5/6    EKG    Rate, intervals and axes as noted on EKG Repor conducted by me. No complications.     PROCEDURE: Cardioversion    : Obie Christianson    Indication: unstable afib with RVR    The patient / caregivers were apprised of diagnostic / treatment options including alternate modes of care, in addition to ri Result Value Ref Range    WBC 7.4 4.0 - 11.0 x10(3) uL    RBC 2.22 (L) 4.30 - 5.70 x10(6)uL    HGB 6.5 (LL) 13.0 - 17.5 g/dL    HCT 20.8 (L) 39.0 - 53.0 %    MCV 93.7 80.0 - 100.0 fL    MCH 29.3 26.0 - 34.0 pg    MCHC 31.3 31.0 - 37.0 g/dL    RDW-SD 44. 3 at bedside providing support for family    - family at bedside now for code - pt with refractory vfib - are requesting we terminate efforts    - Time of death 26 - Dr. Forest Soto was informed of patient's death    Please refer to the nursing notes for complete d

## 2019-08-28 NOTE — ED NOTES
Call back received from 's office, spoke with  Matt with badge number [id number], pt can be released to the [facility] anytime

## 2019-08-28 NOTE — CODE DOCUMENTATION
Pt will intubated by Dr Fuad Schulz   Etomidate 20 mg given  Amiodarone 150mg IV given  Succinycholine 100mg IV given

## 2019-08-28 NOTE — ED NOTES
Contacted [facility], talk to Ruel Chen that pt is a candidate for tissue and cornea donation, wife's contact information provided

## 2021-06-01 NOTE — CODE DOCUMENTATION
CMT reviewed chart for specific instructions from PCP. PCP notes below.     1. Hives  Shower daily and immediately after, apply triamcinolone to affected areas, petroleum jelly elsewhere. Take prednisone daily x 3 days to accelerate improvement.  - triamcinolone (KENALOG) 0.1 % ointment; Apply thin film to itchy skin after showering daily prn  Dispense: 80 g; Refill: 1  - predniSONE (DELTASONE) 10 mg tablet; Take 10 mg by mouth daily.  Dispense: 3 tablet; Refill: 0     2. Itchy skin  As above  - triamcinolone (KENALOG) 0.1 % ointment; Apply thin film to itchy skin after showering daily prn  Dispense: 80 g; Refill: 1  - predniSONE (DELTASONE) 10 mg tablet; Take 10 mg by mouth daily.  Dispense: 3 tablet; Refill: 0    PCP asked for follow up around 09/24/2019. Patient did not follow up.       No pulse, cpr started

## 2022-09-02 NOTE — PLAN OF CARE
Problem: Diabetes/Glucose Control  Goal: Glucose maintained within prescribed range  INTERVENTIONS:  - Monitor Blood Glucose as ordered  - Assess for signs and symptoms of hyperglycemia and hypoglycemia  - Administer ordered medications to maintain glucose moderate assist (50% patients effort)

## 2024-09-26 NOTE — CODE DOCUMENTATION
Submitted PA for Zepbound 2.5MG/0.5ML pen-injectors  Via CMM Key: [id number]  STATUS: APPROVED 09/26/24-05/24/25; letter attached.    If this requires a response please respond to the pool ( P MHCX PSC MEDICATION PRE-AUTH).      Thank you please advise patient.     Vtach, shock with 200J

## (undated) DEVICE — FLOSEAL SEALENT STERILE 10ML

## (undated) DEVICE — BATTERY

## (undated) DEVICE — OCCLUSIVE GAUZE STRIP OVERWRAP,3% BISMUTH TRIBROMOPHENATE IN PETROLATUM BLEND: Brand: XEROFORM

## (undated) DEVICE — PROXIMATE RH ROTATING HEAD SKIN STAPLERS (35 WIDE) CONTAINS 35 STAINLESS STEEL STAPLES: Brand: PROXIMATE

## (undated) DEVICE — STERILE TETRA-FLEX CF, ELASTIC BANDAGE, 4" X 5.5YD: Brand: TETRA-FLEX™CF

## (undated) DEVICE — SUTURE VICRYL 0 CP-1

## (undated) DEVICE — TOWEL OR BLU 16X26 STRL

## (undated) DEVICE — CHLORAPREP 26ML APPLICATOR

## (undated) DEVICE — SUTURE VICRYL 2-0 CT-1

## (undated) DEVICE — BLADE 11 SHRP BP SS SRG STRL

## (undated) DEVICE — SPONGE LAP 18X18 XRAY STRL

## (undated) DEVICE — COVER SGL STRL LGHT HNDL BLU

## (undated) DEVICE — POOLE SUCTION INSTRUMENT WITH REMOVABLE SHEATH: Brand: POOLE

## (undated) DEVICE — SUTURE SILK 3-0 SA64H

## (undated) DEVICE — SOL  .9 3000ML

## (undated) DEVICE — STERILE LATEX POWDER-FREE SURGICAL GLOVESWITH NITRILE COATING: Brand: PROTEXIS

## (undated) DEVICE — LOWER EXTREMITY: Brand: MEDLINE INDUSTRIES, INC.

## (undated) DEVICE — STERILE TETRA-FLEX CF, ELASTIC BANDAGE LATEX FREE 6IN X5.5 YD: Brand: TETRA-FLEX™CF

## (undated) DEVICE — PRECISION (7.0 X 0.51 X 29.5MM)

## (undated) DEVICE — STERILE LATEX POWDER-FREE SURGICAL GLOVES WITH HYDROGEL COATING, SMOOTH FINISH, STRAIGHT FINGER: Brand: PROTEXIS

## (undated) DEVICE — CONTAINER SPEC STR 4OZ GRY LID

## (undated) DEVICE — INTENDED FOR TISSUE SEPARATION, AND OTHER PROCEDURES THAT REQUIRE A SHARP SURGICAL BLADE TO PUNCTURE OR CUT.: Brand: BARD-PARKER ® STAINLESS STEEL BLADES

## (undated) DEVICE — PACK SRG UNV 1 STRL LF

## (undated) DEVICE — 3M™ IOBAN™ 2 ANTIMICROBIAL INCISE DRAPE 6651EZ: Brand: IOBAN™ 2

## (undated) DEVICE — BANDAGE ROLL,100% COTTON, 6 PLY, LARGE: Brand: KERLIX

## (undated) DEVICE — VERSAJET II HYDROSURGERY SYSTEM HANDSET, 45DEG 8MM EXACT: Brand: VERSAJET

## (undated) DEVICE — SUTURE SILK 0

## (undated) DEVICE — SHEET, DRAPE, SPLIT, STERILE: Brand: MEDLINE

## (undated) DEVICE — BONE WAX W31G

## (undated) DEVICE — DRAPE MN CARM

## (undated) DEVICE — SUTURE PROLENE 3-0 8558H

## (undated) DEVICE — BLADE 24 SS SRG STRL

## (undated) DEVICE — SUCTION CANISTER, 3000CC,SAFELINER: Brand: DEROYAL

## (undated) DEVICE — SUTURE VICRYL 3-0 J104T

## (undated) DEVICE — DRAIN RESERVOIR RELIAVAC 100CC

## (undated) DEVICE — SUTURE PROLENE 3-0 8687H

## (undated) DEVICE — STERILE TETRA-FLEX CF, ELASTIC BANDAGE, 2" X 5.5YD: Brand: TETRA-FLEX™CF

## (undated) DEVICE — Device

## (undated) DEVICE — BLADE SAW SAGITTAL 19.5

## (undated) DEVICE — SUTURE ETHILON 4-0 1667G

## (undated) DEVICE — SUTURE ETHILON 2-0 FS

## (undated) DEVICE — DRAIN SILICONE FLAT 10MM

## (undated) DEVICE — SUTURE VICRYL 3-0 SH

## (undated) DEVICE — SUTURE SILK 2-0 SA85H

## (undated) DEVICE — BLADE SW 29MM 58MM THK.64MM LG

## (undated) DEVICE — REM POLYHESIVE ADULT PATIENT RETURN ELECTRODE: Brand: VALLEYLAB

## (undated) DEVICE — TRAY SRGPRP PVP IOD WT SCRB SM

## (undated) DEVICE — ABDOMINAL PAD: Brand: CURITY

## (undated) DEVICE — STERILE POLYISOPRENE POWDER-FREE SURGICAL GLOVES: Brand: PROTEXIS

## (undated) DEVICE — GAUZE SPONGES,12 PLY: Brand: CURITY

## (undated) DEVICE — SUPER SPONGES,MEDIUM: Brand: KERLIX

## (undated) DEVICE — FLEXIBLE YANKAUER,MEDIUM TIP, NO VACUUM CONTROL: Brand: ARGYLE

## (undated) DEVICE — TAPE UMBILICAL U11T

## (undated) DEVICE — SUTURE ETHILON 3-0 669H

## (undated) DEVICE — KENDALL SCD EXPRESS SLEEVES, KNEE LENGTH, MEDIUM: Brand: KENDALL SCD

## (undated) DEVICE — 3M™ COBAN™ NL STERILE NON-LATEX SELF-ADHERENT WRAP, 2084S, 4 IN X 5 YD (10 CM X 4,5 M), 18 ROLLS/CASE: Brand: 3M™ COBAN™

## (undated) DEVICE — SOL  .9 1000ML BTL

## (undated) DEVICE — ZIMMER® STERILE DISPOSABLE TOURNIQUET CUFF WITH PLC, DUAL PORT, SINGLE BLADDER, 34 IN. (86 CM)

## (undated) NOTE — LETTER
[address], [place]  Authorization for Invasive Procedures  1.  I hereby authorize Dr. Pippa Keene , my physician and whomever may be designated as the doctor's assistant, to perform the following operation and/or procedure:  CARDIAC 5. I consent to the photographing of the operations or procedures to be performed for the purposes of advancing medicine, science, and/or education, provided my identity is not revealed.  If the procedure has been videotaped, the physician/surgeon will obta __________ Time: ___________    Statement of Physician  My signature below affirms that prior to the time of the procedure, I have explained to the patient and/or his legal representative, the risks and benefits involved in the proposed treatment and any r

## (undated) NOTE — LETTER
[place] [facility]  Administration of Anesthesia  1. I, Radha Izquierdo, or _________________________________ acting on his behalf, (Patient) (Dependent/Representative) request to receive anesthesia for my pending procedure/operation/treatment.   A infections, high spinal block, spinal bleeding, seizure, cardiac arrest and death. 7. AWARENESS: I understand that it is possible (but unlikely) to have explicit memory of events from the operating room while under general anesthesia.   8. ELECTROCONVULSIV (Date) (Time)                                                                                               (Responsible person in case of minor/ unconscious pt) /Relationship    My signature below affirms that prior to the time of the procedure, I have ex

## (undated) NOTE — IP AVS SNAPSHOT
Patient Demographics     Address  [address] [place] [address] [place] Real [address] Phone  [phone] [facility]  [phone] [facility]      Emergency Contact(s)     Name Relation Home Work [address] [phone]      Allergies as of 1/9/2 Take 100 mg by mouth 2 (two) times daily. Yu Lee, DO         EYE VITAMINS Caps  Next dose due:  1-10-18 MORNING      Take 1 capsule by mouth daily.           HYDROcodone-acetaminophen 5-325 MG Tabs  Commonly known as:  NORCO      Take 1-2 tablets by Commonly known as:  VITAMIN C  Next dose due:  1-10-18 MORNING      Take 500 mg by mouth daily.                 Where to Get Your Medications      Please  your prescriptions at the location directed by your doctor or nurse    Bring a paper prescripti Order ID Medication Name Action Time Action Reason Comments    [id number] Insulin Aspart Pen (NOVOLOG) 100 UNIT/ML flexpen 1-11 Units 01/08/18 1754 Given      [id number] Insulin Aspart Pen (NOVOLOG) 100 UNIT/ML flexpen 1-11 Units 01/08/18 2143 Given      20 Creatinine 1.06 0.50 - 1.50 mg/dL — [facility]   Calcium, Total 8.5 8.5 - 10.5 mg/dL [employer] [facility]   BUN/CREA Ratio 19.8 10.0 - 20.0 — [facility]   Anion Gap 10 0 - 18 mmol/L — [facility]   Calculated Osmolality 286 275 - 295 mOsm/kg [employer] Order Status:  Completed Lab Status:  Final result Updated:  12/31/17 1500    Specimen:  Blood from Blood,peripheral      Blood Culture Result No Growth 5 Days    Blood Culture FREQ X 2 [[id number]] Collected:  12/24/17 2053    Order Status:  Completed Lab Levofloxacin 0.25  Sensitive    Oxacillin 0.5  Sensitive    Trimethoprim/Sulfa <=10  Sensitive    Vancomycin <=0.5  Sensitive                        H&P - H&P Note      H&P signed by Arnoldo Malone MD at 12/25/2017 11:28 AM  Version 1 of 1    Author: Janis Jasmine MD  [facility] Hospitalist  Answering Service number: [phone]    HPI       History of Present Illness:     Mr. Tristian Melton is a 59 yo M with PMH of DM2- insulin dependent, HTN, stroke who presented with L foot pain and swelling after injury to hi Fam Hx  Family History   Problem Relation Age of Onset   • Cancer Father    • Dementia Mother    • Other Mitali Mulling Mother    • Diabetes Paternal Grandfather    • Cancer Brother        Review of Systems  Comprehensive ROS reviewed and negative except for wh No results for input(s): TROP in the last 72 hours. Additional Diagnostics:    Radiology:[GV.1]           Electronically signed by Martina Raman MD on 12/25/2017 11:28 AM   Attribution Espinoza    GV. 1 - Martina Raman MD on 12/25/2017 11:00 AM  GV. and taken to angio on 12/26 and status post intervention to occluded AT and distal peroneal arteries. Additional hospital course complications do include postoperative hypoxia due to aspiration, elevated BNP.   PM and R now has been consulted in order to a LUNGS:  Nonlabored breathing on room air. No wheezing appreciated. HEART:  No edema noted in the upper extremities. ABDOMEN:  Soft, nontender.   I did not appreciate hepatomegaly on exam.  EXTREMITIES:  No clubbing or cyanosis noted in the upper extrem and bladder management, skin management, and safety awareness management. Physiatric medical oversight to monitor kidney status, cardiac status, pulmonary status, neurologic status, and DVT prevention. Estimated length of stay 10-14 days.   Anticipated di to coordinate[ST.1] pain[ST.3] meds[ST.2] but at that time, per RN, pt stated he is comfortable and didn't want his pain meds. Per wife, pt has been in bed and sleeping so his pain was not bad in bed .   Educated pt that when amputated surgical leg is in de PT Discharge Recommendations: Acute rehabilitation     PLAN  PT Treatment Plan: Bed mobility; Body mechanics; Patient education;Gait training;Neuromuscular re-educate;Range of motion;Strengthening;Transfer training;Balance training    SUBJECTIVE  I am in manny Stoop/Curb Assistance: Not tested  Comment :  (NA)    Additional information: Bed mobility, transfer training with RW as support. Gt training on plain surface. Instruction with HEP while seated and in supine.  Instructed to be upright on recliner and ponce pr Filed:  1/9/2018 11:34 AM Date of Service:  1/9/2018 11:13 AM Status:  Addendum    :  Ryne Caballero PT (Physical Therapist)    Related Notes:  Addendum by Ryne Caballero PT (Physical Therapist) filed at 1/9/2018 12:07 PM  Original Note by SUPRIYA importance. Also reinforced exes while in bed as well. Advised patient to perform LE AROM exs X15rps 2-3 times/day as instructed to improve circulation and strength.   Gentle Lt hip flexion and extension stretches performed since pt with some pain at the Lt -   Turning over in bed (including adjusting bedclothes, sheets and blankets)?: None   -   Sitting down on and standing up from a chair with arms (e.g., wheelchair, bedside commode, etc.): A Little   -   Moving from lying on back to sitting on the side of Current Status  CGA with RW, reminders needed for proper hand placement. Goal #3 Patient is able to ambulate 50' feet with assist device: walker - rolling at assistance level:  CGAx1   Goal #3   Current Status  40ft and 20ft with RW/CGA         Goal #4 Pt performed supine to sit with MI. Performed with SBA/CGA in sit<>stand with RW with some VC for safety and hand placement. Trained pt for amb to 40ftx1 and 20ftx1 with RW/CGAx1. Pt was able to hop on his RLE well with no LOB noted.  Amb limited due to BALANCE                                                                                                                     Static Sitting: Good  Dynamic Sitting: Good           Static Standing: Fair -  Dynamic Standing: [place] addressed; Family present    CURRENT GOALS   Goals to be met by:    Patient Goal Patient's self-stated goal is: Return home   Goal #1 Patient is able to demonstrate supine - sit EOB @ level: modified independent   Goal #1   Current Status MI for supine to s Problem List  Principal Problem:    Cellulitis in diabetic foot ([address]  Active Problems:    Gangrene of toe ([facility]      ASSESSMENT   Pt was seen for skilled PT treatment with RN approval. Pt with c/o 5/10 pain at the surgical site, Vitor Cameron was informed. Weight Bearing Restriction: L lower extremity           L Lower Extremity: Non-Weight Bearing    PAIN ASSESSMENT   Ratin  Location:  (c/o Lt stump surgical site, RN is aware,has gotten pain meds)  Management Techniques:  Activity promotion;Breathing qian Glut sets  Hip AB/AD  Knee extension  LAQ  Quad sets  SLR     Position Sitting and Supine       Patient End of Session: Up in chair;Needs met;Call light within reach;RN aware of session/findings; All patient questions and concerns addressed; Family present to mild dizziness with sitting. Able to walk 50 ft x1 with RW and CGA with chair follow. Rested 3-4 minutes, then walked back to room from chair with RW and CGA x50 ft. Finished session up in chair with legs elevated. Alarm set.   All needs left within Standardized Score (AM-PAC Scale): 43.63   CMS Modifier (G-Code): CK    FUNCTIONAL ABILITY STATUS  Gait Assessment   Gait Assistance: Other (Comment) (CGA)  Distance (ft): 2x 50 ft  Assistive Device: Rolling walker  Pattern:  (pt was able to hop on his RLE Room Number: 554/554-A[EW.1]          Presenting Problem: L bka 1/2/18[EW.2]    Problem List[EW.1]  Principal Problem:    Cellulitis in diabetic foot ([address]  Active Problems:    Gangrene of toe (HCC)[EW.2]      ASSESSMENT   RN cleared patient for therapy; p ACTIVITY TOLERANCE[EW.1]  Room air  No shortness of breath[EW.3]    ACTIVITIES OF DAILY LIVING ASSESSMENT  AM-PAC ‘6-Clicks’ Inpatient Daily Activity Short Form  How much help from another person does the patient currently need…[EW.1]  -   Putting on and t Patient will complete item retrieval w/ supervision. Comment: new goal         Goals  on:18  Frequency:3x/week[EW.3]           Attribution Key    EW. 1 - Enoc Jacobs OT on 2018  3:17 PM  EW. 2 - Enoc Jacobs OT on 2018  3:18 PM Current Diet Consistency: Regular; Thin liquids  Prior Level of Function: Independent  Prior Living Situation: Home with spouse  History of Recent: Increasing chest congestion  Precautions: Aspiration[MD.2]  Imaging results: CXR 12/29/17    CONCLUSION:   1. Reviewed results and recommendations with patient/family/caregiver. Agreement/Understanding verbalized and all questions answered to their apparent satisfaction. INTERDISCIPLINARY COMMUNICATION  Reviewed results with Radiologist; agreement verbalized. [

## (undated) NOTE — LETTER
[facility], [place]  Authorization for Invasive Procedures  1.  I hereby authorize Dr. Gio Fernandez , my physician and whomever may be designated as the doctor's assistant, to perform the following operation and/or procedure:  Jody Ward performed for the purposes of advancing medicine, science, and/or education, provided my identity is not revealed. If the procedure has been videotaped, the physician/surgeon will obtain the original videotape.  The hospital will not be responsible for stor My signature below affirms that prior to the time of the procedure, I have explained to the patient and/or his legal representative, the risks and benefits involved in the proposed treatment and any reasonable alternative to the proposed treatment.  I have

## (undated) NOTE — LETTER
[facility] [address], [address]  Consent for Procedure/Sedation    Date: 07/27/18    Time: 0600      1.  I authorize the performance upon Cesilia Bowers the following:cardiac catheterization, left ventricular cineangiography, sage period, the physician will determine when the applicable recovery period ends for purposes of reinstating the Do Not Resuscitate (DNR) order.     Signature of Patient: ____________________________________________________    Signature of person authorized

## (undated) NOTE — IP AVS SNAPSHOT
[facility]            (For Outpatient Use Only) Initial Admit Date: 7/20/2018   Inpt/Obs Admit Date: Inpt: 7/21/18 / Obs: N/A   Discharge Date:    Pretty Copier:  [de-identified]   MRN: [de-identified]   CSN: [id number]        ENCOUNTER  Patient Class

## (undated) NOTE — LETTER
Hospital Discharge Documentation  Pt was transferred to [facility], Discharge Summary below for your review.      From: [address] Hospitalist's Office  Phone: [phone]    Patient discharged time/date: 7/24/2018  6:15 PM  Patient discharge dis Gen: A+Ox3. No distress. HEENT: NCAT, neck supple, no carotid bruit. CV: RRR, S1S2, and intact distal pulses of RLE. Systolic murmur+  Pulm: Effort and breath sounds normal. No distress, wheezes, rales, rhonchi.   Abd: Soft, NTND, BS normal, no mass, no 2 times in the last month.   May be related to vertebrobasilar insufficiency  Now also with abnormal stress test  Will need close cardiology f/u    CAD  Found to have abn stress test  Cardiology planning for [facility] as OP given acute issues above  Pt currently MetFORMIN HCl  MG Tb24  Commonly known as:  GLUCOPHAGE-XR      Take 1 tablet (500 mg total) by mouth 2 (two) times daily with meals. Quantity:  180 tablet  Refills:  1     ONETOUCH VERIO Strp      Check glucose 3 x daily. Use as directed.    Luellen Homans

## (undated) NOTE — IP AVS SNAPSHOT
Patient Demographics     Address  [address] [place] [address] [place] [address] Phone  [phone] [facility]  [phone] [facility]      Emergency Contact(s)     Name Relation Home Work Pooja Spouse [phone]    Johana,Shahzad Son   025 insulin glargine 100 UNIT/ML Sopn  Commonly known as:  Jo Ann Leahy  Next dose due:   Tonight 07/22      Inject 70 units subcutaneously every night at bed time   Keara Haque MD         Liraglutide 18 MG/3ML Sopn  Commonly known as:  VICTOZA  Next dose [id number] OCUVITE-LUTEIN (OCUVITE - EYE VITAMIN) tab 1 tablet 07/24/18 1120 Given      [id number] Sodium Chloride 0.9 % solution 07/23/18 1930 Given      [id number] Sodium Chloride 0.9 % solution 07/23/18 2245 Given      [id number] Sodium Chloride 0.9 % solu Creatinine 1.17 0.50 - 1.50 mg/dL — [facility]   Calcium, Total 9.1 8.5 - 10.5 mg/dL [employer] [facility]   BUN/CREA Ratio 23.1 10.0 - 20.0 [facility]   Anion Gap 9 0 - 18 mmol/L — [facility]   Calculated Osmolality 294 275 - 295 mOsm/kg [employer] seen in Factor deficiency, vitamin K deficiency,  factor inhibitors, liver disease, etc.  Clinical correlation is recommended.               Testing Performed By     [address] Name Director Address Valid Date Range    [facility] [address] [facility] [address] [facility] • S/P BKA (below knee amputation) unilateral, left ([address] 1/19/2018   • Stroke [facility])      History reviewed. No pertinent surgical history.   Family History   Problem Relation Age of Onset   • Cancer Father    • Dementia Mother    • Diabetes Mother    • Other Ear/Nose/Mouth/Throat:  Negative. Respiratory:  Negative  Cardiovascular: Negative  Gastrointestinal:  Negative. Genitourinary:  Negative  Endocrine:  Negative. Immunologic:  Negative. Musculoskeletal:  Negative. Integumentary:  Negative.   Neurologic: AST 22 07/20/2018   ALT 19 07/20/2018   PTT 34.2 07/20/2018   INR 1.2 07/20/2018       Imaging:  Card Echo 2d Doppler (cpt=93306)   Conclusions 1. Left ventricle: The cavity size was normal. Wall thickness was    increased in a pattern of moderate LVH.  Sys SA.3 - Rain Gunderson MD on 7/21/2018  5:07 AM                        Consults - MD Consult Notes      Consults signed by Fadumo Moreira MD at 7/23/2018 11:47 AM      Author:  Fadumo Moreira MD Service:  Neurology Author Type:  Physician    Filed:  7/2 VITAL SIGNS:  Recorded in the chart. Temperature 98.3, pulse 80, respiratory rate 18, blood pressure 108/78, pulse ox 96%. NECK:  No carotid bruits. EXTREMITIES:  Left below-knee amputation. NEUROLOGIC:  Pupils are reactive to light.   Optic discs are f outside of the time window with the symptoms starting at 5 p.m. Friday.       Dictated By Dalton Navarro MD  d: 07/22/2018 14:35:44  t: 07/23/2018 08:20:01  [facility] [id number]  [facility]/  [DL.1]  Electronically signed by Jam Gamez MD on 7/23/201 BP: 121/76   Pulse:[RS.3] 72[RS. 2]   Resp: 18   Temp:[RS.3] 37.1   Gen: A+Ox3. No distress. HEENT: NCAT, neck supple, no carotid bruit. CV: RRR, S1S2, and intact distal pulses of RLE.  Systolic murmur+  Pulm: Effort and breath sounds normal. No distress Syncope  2 times in the last month.   May be related to vertebrobasilar insufficiency  Now also with abnormal stress test  Will need close cardiology f/u    CAD  Found to have abn stress test  Cardiology planning for [facility] as OP given acute issues above  Pt c MetFORMIN HCl  MG Tb24  Commonly known as:  GLUCOPHAGE-XR      Take 1 tablet (500 mg total) by mouth 2 (two) times daily with meals. Quantity:  180 tablet  Refills:  1     ONETOUCH VERIO Strp      Check glucose 3 x daily. Use as directed.    Tanya Galvan called PCT and neurologist and they not called me back. Talk to RN will check pt tomorrow after result. [DP.1]      Attribution Espinoza    DP. 1 Malachi Castañeda, PTA on 7/24/2018  4:34 PM               Physical Therapy Note signed by Lakeshia Flores, PT at 7/23/2018 considerations and fall risk and safety. Pt and family educated on d/c recommendations for 24hr SV/assist, and they feel that family can provide assist pt requires at home.  Pt stated he wants to go home as well, and is agreeable to continue PT with outpati PT Approx Degree of Impairment Score: 50.57%   Standardized Score (AM-PAC Scale): 42.13   CMS Modifier (G-Code): CK[RP.2]    FUNCTIONAL ABILITY STATUS  Gait Assessment[RP.1]   Gait Assistance: Minimum assistance  Distance (ft): 50' x3  Assistive Device: Ro :  Nora Varner OT (Occupational Therapist)       OCCUPATIONAL THERAPY TREATMENT NOTE - INPATIENT        Room Number: 327/327-A[AA.1]           Presenting Problem:  (slurred speech, Rt sided weakness)[AA.2]    Problem List[AA. 1]  Principal Problem: Weight Bearing Restriction: None[AA. 2]                PAIN ASSESSMENT[AA.1]  Ratin[AA. 2]           ACTIVITY TOLERANCE  VSS on RA     ACTIVITIES OF DAILY LIVING ASSESSMENT  AM-PAC ‘6-Clicks’ Inpatient Daily Activity Short Form  How much help from anothe Patient will participate in right Fine motor/coordination ex program independently  Progressing; added Gross motor; AAROM/SROM as patient overall right sided function has declined     Goals  on:     Frequency:  5 x week          [address], :  Richard Sherman OT (Occupational Therapist)       OCCUPATIONAL THERAPY TREATMENT NOTE - INPATIENT        Room Number: 327/327-A           Presenting Problem:  (slurred speech, Rt sided weakness)    Problem List  Principal Problem:    Acute CVA AM-PAC ‘6-Clicks’ Inpatient Daily Activity Short Form  How much help from another person does the patient currently need…  -   Putting on and taking off regular lower body clothing?: A Little  -   Bathing (including washing, rinsing, drying)?: A Little  -

## (undated) NOTE — ED AVS SNAPSHOT
Les Brumfield   MRN: [id number]    Department:  [facility] Emergency Department   Date of Visit:  12/21/2017           Disclosure     Insurance plans vary and the physician(s) referred by the ER may not be covered by your plan.  Please contact CARE PHYSICIAN AT ONCE OR RETURN IMMEDIATELY TO THE EMERGENCY DEPARTMENT. If you have been prescribed any medication(s), please fill your prescription right away and begin taking the medication(s) as directed.   If you believe that any of the medications

## (undated) NOTE — LETTER
[address]    CONSENT FOR EXAMINATION AND DISPOSITION OF AMPUTATED MEMBERS      I have already consented to the amputation of my _______________________________ (Witness Signature/Date)     Patient Name: Bettina Coyle     : 1955                 Printed: 2017      Medical Record #: [id number]

## (undated) NOTE — LETTER
Hospital Discharge Documentation  Please phone to schedule a hospital follow up appointment.     From: [address] Hospitalist's Office  Phone: [phone]    Patient discharged time/date: 5/8/2019  2:17 PM  Patient discharge disposition:  Home or Self CV surgery consultation obtained. Patient deemed too high risk for open heart bypass. Complicated PCI recommended. He will return in 1 week for that. Also noted to have severe aortic stenosis. Recommended to have TAVR procedure.   At the time of discha Refills:  11        CONTINUE taking these medications      Instructions Prescription details   aspirin 81 MG Tabs      Take 1 tablet (81 mg total) by mouth daily.    Quantity:  90 tablet  Refills:  3     atorvastatin 80 MG Tabs  Commonly known as:  LIPITOR WW.1 - Cecile Amos MD on 5/8/2019  2:14 PM   WW.2 - Cecile Amos MD on 5/8/2019  2:16 PM

## (undated) NOTE — LETTER
May 4, 2018    Keely Huang [id number]      Dear Arabella Verdin:    The following are the results of your recent tests. Please review the list of test results.   Your result is the value on the left; we have also supplied the range o

## (undated) NOTE — IP AVS SNAPSHOT
[facility]            (For Outpatient Use Only) Initial Admit Date: 12/24/2017   Inpt/Obs Admit Date: Inpt: 12/24/17 / Obs: N/A   Discharge Date:    Juan Carlos Espinoza:  [de-identified]   MRN: [de-identified]   CSN: [id number]        Damaris Caceres